# Patient Record
Sex: FEMALE | Race: BLACK OR AFRICAN AMERICAN | Employment: PART TIME | URBAN - METROPOLITAN AREA
[De-identification: names, ages, dates, MRNs, and addresses within clinical notes are randomized per-mention and may not be internally consistent; named-entity substitution may affect disease eponyms.]

---

## 2016-06-06 LAB — COLONOSCOPY, EXTERNAL: NORMAL

## 2017-01-18 RX ORDER — LANCETS 33 GAUGE
EACH MISCELLANEOUS
Qty: 100 LANCET | Status: SHIPPED | OUTPATIENT
Start: 2017-01-18 | End: 2018-04-08 | Stop reason: SDUPTHER

## 2017-01-18 RX ORDER — BLOOD SUGAR DIAGNOSTIC
STRIP MISCELLANEOUS
Qty: 100 STRIP | Status: SHIPPED | OUTPATIENT
Start: 2017-01-18 | End: 2018-02-09 | Stop reason: SDUPTHER

## 2017-01-24 RX ORDER — METOPROLOL SUCCINATE 200 MG/1
TABLET, EXTENDED RELEASE ORAL
Qty: 90 TAB | Refills: 0 | Status: SHIPPED | OUTPATIENT
Start: 2017-01-24 | End: 2017-07-10 | Stop reason: SDUPTHER

## 2017-01-27 ENCOUNTER — OFFICE VISIT (OUTPATIENT)
Dept: NEUROLOGY | Age: 72
End: 2017-01-27

## 2017-01-27 VITALS
HEIGHT: 64 IN | HEART RATE: 60 BPM | SYSTOLIC BLOOD PRESSURE: 142 MMHG | DIASTOLIC BLOOD PRESSURE: 86 MMHG | BODY MASS INDEX: 39.95 KG/M2 | WEIGHT: 234 LBS | OXYGEN SATURATION: 97 % | RESPIRATION RATE: 20 BRPM

## 2017-01-27 DIAGNOSIS — G45.2 MULTIPLE AND BILATERAL PRECEREBRAL ARTERY SYNDROMES: Primary | ICD-10-CM

## 2017-01-27 DIAGNOSIS — M54.81 OCCIPITAL NEURALGIA OF RIGHT SIDE: ICD-10-CM

## 2017-01-27 DIAGNOSIS — G45.2 MULTIPLE AND BILATERAL PRECEREBRAL ARTERY SYNDROMES: ICD-10-CM

## 2017-01-27 DIAGNOSIS — I10 ESSENTIAL HYPERTENSION: ICD-10-CM

## 2017-01-27 DIAGNOSIS — R09.89 BILATERAL CAROTID BRUITS: ICD-10-CM

## 2017-01-27 DIAGNOSIS — E78.00 ELEVATED CHOLESTEROL: ICD-10-CM

## 2017-01-27 DIAGNOSIS — E11.21 TYPE 2 DIABETES WITH NEPHROPATHY (HCC): ICD-10-CM

## 2017-01-27 DIAGNOSIS — G50.0 SUPRAORBITAL NEURALGIA: Primary | ICD-10-CM

## 2017-01-27 DIAGNOSIS — G47.33 OSA (OBSTRUCTIVE SLEEP APNEA): ICD-10-CM

## 2017-01-27 RX ORDER — OXCARBAZEPINE 300 MG/1
TABLET, FILM COATED ORAL
Qty: 90 TAB | Refills: 11 | Status: SHIPPED | OUTPATIENT
Start: 2017-01-27 | End: 2018-02-15 | Stop reason: SDUPTHER

## 2017-01-27 NOTE — PATIENT INSTRUCTIONS

## 2017-01-27 NOTE — MR AVS SNAPSHOT
Visit Information Date & Time Provider Department Dept. Phone Encounter #  
 1/27/2017 10:00 AM Antwan Cabral NP [facility] [phone] [id number] Follow-up Instructions Return in about 1 year (around 1/27/2018). Your Appointments 4/6/2017  9:15 AM  
ROUTINE CARE with Hattie Councilman, MD  
[address] [facility]) Appt Note: 4 month follow up DM  
 [address] [facility] [address] [facility]  
[phone]  
  
   
 [address] [place] [address] [address] [address] Upcoming Health Maintenance Date Due DTaP/Tdap/Td series (1 - Tdap) 10/8/1968 ZOSTER VACCINE AGE 60> 10/8/2007 MEDICARE YEARLY EXAM 12/5/2013 INFLUENZA AGE 9 TO ADULT 8/1/2016 MICROALBUMIN Q1 2/9/2017 FOBT Q 1 YEAR AGE 50-75 2/16/2017 EYE EXAM RETINAL OR DILATED Q1 4/25/2017 HEMOGLOBIN A1C Q6M 6/6/2017 LIPID PANEL Q1 11/9/2017 FOOT EXAM Q1 12/6/2017 BREAST CANCER SCRN MAMMOGRAM 3/21/2018 GLAUCOMA SCREENING Q2Y 4/25/2018 Allergies as of 1/27/2017  Review Complete On: 1/27/2017 By: Antwan Cabral NP Severity Noted Reaction Type Reactions Hydroxyzine  12/06/2016    Other (comments) Caused memory loss Levaquin [Levofloxacin]  02/21/2016    Diarrhea Lipitor [Atorvastatin]  12/11/2014   Side Effect Myalgia Lisinopril  12/04/2012   Side Effect Cough Penicillins  03/08/2011    Rash  
 Sulfa (Sulfonamide Antibiotics)  03/08/2011    Rash  
 Zoloft [Sertraline]  05/10/2016    Diarrhea Current Immunizations  Reviewed on 12/6/2016 Name Date Influenza Vaccine 10/29/2013 Influenza Vaccine (Quad) PF 12/18/2015 Influenza Vaccine Split 11/16/2012, 9/21/2011 10:00 AM  
 Pneumococcal Polysaccharide (PPSV-23) 12/4/2012 Pneumococcal Vaccine (Unspecified Type) 3/21/2012 Not reviewed this visit You Were Diagnosed With   
  
 Codes Comments Supraorbital neuralgia    -  Primary ICD-10-CM: G52.9 ICD-9-CM: 352.9 Multiple and bilateral precerebral artery syndromes     ICD-10-CM: G45.2 ICD-9-CM: 433.30 Elevated cholesterol     ICD-10-CM: E78.00 ICD-9-CM: 272.0 Essential hypertension     ICD-10-CM: I10 
ICD-9-CM: 401.9 Type 2 diabetes with nephropathy (HCC)     ICD-10-CM: E11.21 
ICD-9-CM: 250.40, 583.81   
 SKYLER (obstructive sleep apnea)     ICD-10-CM: G47.33 
ICD-9-CM: 327.23 Occipital neuralgia of right side     ICD-10-CM: M54.81 ICD-9-CM: 723.8 Bilateral carotid bruits     ICD-10-CM: R09.89 ICD-9-CM: 964. 9 Vitals BP Pulse Resp Height(growth percentile) Weight(growth percentile) SpO2  
 142/86 60 20 5' 4\" (1.626 m) 234 lb (106.1 kg) 97% BMI OB Status Smoking Status 40.17 kg/m2 Hysterectomy Never Smoker Vitals History BMI and BSA Data Body Mass Index Body Surface Area  
 40.17 kg/m 2 2.19 m 2 Preferred Pharmacy Pharmacy Name Phone [place] [address] Your Updated Medication List  
  
   
This list is accurate as of: 1/27/17 10:41 AM.  Always use your most recent med list. amLODIPine-valsartan  mg per tablet Commonly known as:  Ely Baptise Take 1 Tab by mouth daily. aspirin-dipyridamole  mg per SR capsule Commonly known as:  AGGRENOX  
TAKE ONE CAPSULE BY MOUTH TWICE A DAY * BD ULTRA-FINE II LANCETS 30 gauge Misc Generic drug:  lancets TEST DAILY OR AS DIRECTED * One Touch Delica 33 gauge Misc Generic drug:  lancets Use as directed * One Touch Delica 33 gauge Misc Generic drug:  lancets TEST DAILY OR AS DIRECTED BY PHYSICIAN Blood Pressure Monitor Kit Commonly known as:  BLOOD PRESSURE KIT Use 2-3 times a week or as directed Blood-Glucose Meter monitoring kit Commonly known as:  Atlee Citron Test daily or as directed. butalbital-acetaminophen-caffeine -40 mg per tablet Commonly known as:  Quiñonez eGlacio Take 1 Tab by mouth every four (4) hours as needed for Pain. cyclobenzaprine 10 mg tablet Commonly known as:  FLEXERIL  
TAKE ONE TABLET BY MOUTH THREE TIMES DAILY AS NEEDED FOR MUSCLE SPASM  
  
 fluticasone 50 mcg/actuation nasal spray Commonly known as:  Apurva Gut 2 Sprays by Both Nostrils route daily. glipiZIDE 10 mg tablet Commonly known as:  GLUCOTROL  
TAKE ONE TABLET BY MOUTH TWICE A DAY  
  
 hydroCHLOROthiazide 25 mg tablet Commonly known as:  HYDRODIURIL  
TAKE ONE TABLET BY MOUTH DAILY **GENERIC FOR: HYDRODIURIL  
  
 metoprolol succinate 200 mg XL tablet Commonly known as:  TOPROL-XL  
TAKE ONE TABLET BY MOUTH DAILY MUCINEX 1,200 mg Ta12 ER tablet Generic drug:  guaiFENesin Take  by mouth two (2) times a day. ONETOUCH ULTRA TEST strip Generic drug:  glucose blood VI test strips TEST DAILY OR AS DIRECTED OXcarbazepine 300 mg tablet Commonly known as:  TRILEPTAL  
TAKE ONE AND ONE-HALF TABLET BY MOUTH TWICE A DAY  
  
 rosuvastatin 40 mg tablet Commonly known as:  CRESTOR  
TAKE ONE TABLET BY MOUTH NIGHTLY SITagliptin-metFORMIN 100-1,000 mg Tm24 Commonly known as:  JANUMET XR Take 1 Tab by mouth Daily (before dinner). * Notice: This list has 3 medication(s) that are the same as other medications prescribed for you. Read the directions carefully, and ask your doctor or other care provider to review them with you. Prescriptions Sent to Pharmacy Refills OXcarbazepine (TRILEPTAL) 300 mg tablet 11 Sig: TAKE ONE AND ONE-HALF TABLET BY MOUTH TWICE A DAY Class: Normal  
 Pharmacy: [facility] [address], [address] [place] [address] #: [phone] Follow-up Instructions Return in about 1 year (around 1/27/2018). To-Do List   
 01/27/2017 Imaging:  DUPLEX CAROTID BILATERAL AMB NEURO Patient Instructions A Healthy Lifestyle: Care Instructions Your Care Instructions A healthy lifestyle can help you feel good, stay at a healthy weight, and have plenty of energy for both work and play. A healthy lifestyle is something you can share with your whole family. A healthy lifestyle also can lower your risk for serious health problems, such as high blood pressure, heart disease, and diabetes. You can follow a few steps listed below to improve your health and the health of your family. Follow-up care is a key part of your treatment and safety. Be sure to make and go to all appointments, and call your doctor if you are having problems. Its also a good idea to know your test results and keep a list of the medicines you take. How can you care for yourself at home? · Do not eat too much sugar, fat, or fast foods. You can still have dessert and treats now and then. The goal is moderation. · Start small to improve your eating habits. Pay attention to portion sizes, drink less juice and soda pop, and eat more fruits and vegetables. ¨ Eat a healthy amount of food. A 3-ounce serving of meat, for example, is about the size of a deck of cards. Fill the rest of your plate with vegetables and whole grains. ¨ Limit the amount of soda and sports drinks you have every day. Drink more water when you are thirsty. ¨ Eat at least 5 servings of fruits and vegetables every day. It may seem like a lot, but it is not hard to reach this goal. A serving or helping is 1 piece of fruit, 1 cup of vegetables, or 2 cups of leafy, raw vegetables. Have an apple or some carrot sticks as an afternoon snack instead of a candy bar. Try to have fruits and/or vegetables at every meal. 
· Make exercise part of your daily routine. You may want to start with simple activities, such as walking, bicycling, or slow swimming. Try to be active 30 to 60 minutes every day. You do not need to do all 30 to 60 minutes all at once.  For example, you can exercise 3 times a day for 10 or 20 minutes. Moderate exercise is safe for most people, but it is always a good idea to talk to your doctor before starting an exercise program. 
· Keep moving. Mala Mauricio the lawn, work in the garden, or [employer]. Take the stairs instead of the elevator at work. · If you smoke, quit. People who smoke have an increased risk for heart attack, stroke, cancer, and other lung illnesses. Quitting is hard, but there are ways to boost your chance of quitting tobacco for good. ¨ Use nicotine gum, patches, or lozenges. ¨ Ask your doctor about stop-smoking programs and medicines. ¨ Keep trying. In addition to reducing your risk of diseases in the future, you will notice some benefits soon after you stop using tobacco. If you have shortness of breath or asthma symptoms, they will likely get better within a few weeks after you quit. · Limit how much alcohol you drink. Moderate amounts of alcohol (up to 2 drinks a day for men, 1 drink a day for women) are okay. But drinking too much can lead to liver problems, high blood pressure, and other health problems. Family health If you have a family, there are many things you can do together to improve your health. · Eat meals together as a family as often as possible. · Eat healthy foods. This includes fruits, vegetables, lean meats and dairy, and whole grains. · Include your family in your fitness plan. Most people think of activities such as jogging or tennis as the way to fitness, but there are many ways you and your family can be more active. Anything that makes you breathe hard and gets your heart pumping is exercise. Here are some tips: 
¨ Walk to do errands or to take your child to school or the bus. ¨ Go for a family bike ride after dinner instead of watching TV. Where can you learn more? Go to http://yasmin-lokesh.info/. Enter D823 in the search box to learn more about \"A Healthy Lifestyle: Care Instructions. \" Current as of: July 26, 2016 Content Version: 11.1 © 5421-3573 [employer]. Care instructions adapted under license by [employer] (which disclaims liability or warranty for this information). If you have questions about a medical condition or this instruction, always ask your healthcare professional. [address] any warranty or liability for your use of this information. Introducing [facility]! Dear Jayda Green: 
Thank you for requesting a Compete account. Our records indicate that you already have an active Compete account. You can access your account anytime at [email or web address] Did you know that you can access your hospital and ER discharge instructions at any time in Compete? You can also review all of your test results from your hospital stay or ER visit. Additional Information If you have questions, please visit the Frequently Asked Questions section of the Compete website at [email or web address] Remember, Compete is NOT to be used for urgent needs. For medical emergencies, dial 911. Now available from your iPhone and Android! Please provide this summary of care documentation to your next provider. Your primary care clinician is listed as Steffanie Fung. If you have any questions after today's visit, please call [phone].

## 2017-01-27 NOTE — PROGRESS NOTES
The neuralgia is better but she can tell if she doesn't take the medications it will act up like yesterday but it went away very quickly   She doesn't forget very often because she knows it will start hurting very soon after not taking the medication

## 2017-01-27 NOTE — PROGRESS NOTES
Date:  2017    Name:  Salo Alexis  :  1945  MRN:  [id number]     PCP:  Natalee Pham MD    Chief Complaint   Patient presents with    Neurologic Problem     supraorbital neuralgia      HISTORY OF PRESENT ILLNESS: Follow up supraorbital neuralgia. Since she has been on the Trileptal, she has not had any issues with this. States that if she forgets to take it that the pain will tick up. Otherwise she has been doing well. No stroke like symptoms. Continues with Aggrenox for prevention, Crestor for treatment of the dyslipidemia. Current Outpatient Prescriptions   Medication Sig    metoprolol succinate (TOPROL-XL) 200 mg XL tablet TAKE ONE TABLET BY MOUTH DAILY    ONE TOUCH DELICA 33 gauge misc TEST DAILY OR AS DIRECTED BY PHYSICIAN    ONETOUCH ULTRA TEST strip TEST DAILY OR AS DIRECTED    OXcarbazepine (TRILEPTAL) 300 mg tablet TAKE ONE AND ONE-HALF TABLET BY MOUTH TWICE A DAY MUST CALL MD FOR APPOINTMENT    ONE TOUCH DELICA 33 gauge misc Use as directed    hydrochlorothiazide (HYDRODIURIL) 25 mg tablet TAKE ONE TABLET BY MOUTH DAILY **GENERIC FOR: HYDRODIURIL    aspirin-dipyridamole (AGGRENOX)  mg per SR capsule TAKE ONE CAPSULE BY MOUTH TWICE A DAY    rosuvastatin (CRESTOR) 40 mg tablet TAKE ONE TABLET BY MOUTH NIGHTLY    glipiZIDE (GLUCOTROL) 10 mg tablet TAKE ONE TABLET BY MOUTH TWICE A DAY    sitaGLIPtin-metFORMIN (JANUMET XR) 100-1,000 mg TM24 Take 1 Tab by mouth Daily (before dinner).  amLODIPine-valsartan (EXFORGE)  mg per tablet Take 1 Tab by mouth daily.  Blood Pressure Monitor (BLOOD PRESSURE KIT) kit Use 2-3 times a week or as directed    fluticasone (FLONASE) 50 mcg/actuation nasal spray 2 Sprays by Both Nostrils route daily.  guaiFENesin (MUCINEX) 1,200 mg Ta12 ER tablet Take  by mouth two (2) times a day.  Blood-Glucose Meter (ONETOUCH ULTRAMINI) monitoring kit Test daily or as directed.     butalbital-acetaminophen-caffeine (FIORICET, ESGIC) -40 mg per tablet Take 1 Tab by mouth every four (4) hours as needed for Pain.  cyclobenzaprine (FLEXERIL) 10 mg tablet TAKE ONE TABLET BY MOUTH THREE TIMES DAILY AS NEEDED FOR MUSCLE SPASM    BD ULTRA-FINE II LANCETS 30 gauge misc TEST DAILY OR AS DIRECTED     No current facility-administered medications for this visit. Allergies   Allergen Reactions    Hydroxyzine Other (comments)     Caused memory loss    Levaquin [Levofloxacin] Diarrhea    Lipitor [Atorvastatin] Myalgia    Lisinopril Cough    Penicillins Rash    Sulfa (Sulfonamide Antibiotics) Rash    Zoloft [Sertraline] Diarrhea     Past Medical History   Diagnosis Date    ACP (advance care planning) 2/9/2016     Patient plans to prepare an advanced directive and bring it in   [place] [id number] 5/31/2016    Cataracts, bilateral 8/1/2011    Depression, major, single episode, moderate ([address] 8/1/2011     Sees Dr Vinnie Botello    Diabetic nephropathy ([address] 8/1/2011    Essential hypertension     Financial difficulties 7/30/2012    Hyperlipidemia     Obesity     SKYLER (obstructive sleep apnea) 2/9/2016    Pregnancy 8/1/2011    Stroke ([address]      2004 ARABELLA    TIA (transient ischemic attack) 8/1/2011     Past Surgical History   Procedure Laterality Date    Hx myomectomy       oiver 30 years    Hx gyn      Hx hysterectomy       over 25 years     Social History     Social History    Marital status: SINGLE     Spouse name: N/A    Number of children: N/A    Years of education: N/A     Occupational History    Not on file.      Social History Main Topics    Smoking status: Never Smoker    Smokeless tobacco: Never Used    Alcohol use 0.6 oz/week     1 Standard drinks or equivalent per week      Comment: occasionally    Drug use: No    Sexual activity: Not Currently     Other Topics Concern    Not on file     Social History Narrative     Family History   Problem Relation Age of Onset    Hypertension Mother     Seizures Mother  Hypertension Son     Diabetes Son     Elevated Lipids Son     Ataxia Daughter     Seizures Brother     Cancer Maternal Aunt        PHYSICAL EXAMINATION:    Visit Vitals    /86    Pulse 60    Resp 20    Ht 5' 4\" (1.626 m)    Wt 106.1 kg (234 lb)    SpO2 97%    BMI 40.17 kg/m2     General: Well defined, nourished, and groomed individual in no acute distress. Neck: Supple, nontender, no bruits, no pain with resistance to active range of motion. Significant neck shoulder and back spasm. There is some tenderness between the shoulders   Heart: Regular rate and rhythm, no murmurs, rub, or gallop. Normal S1S2. Lungs: Clear to auscultation bilaterally with equal chest expansion, no cough, no wheeze   Musculoskeletal: Extremities revealed no edema and had full range of motion of joints. Psych: Good mood and bright affect   NEUROLOGICAL EXAMINATION:   Mental Status: Alert and oriented to person, place, and time with recent and remote memory intact. Attention span and concentration are normal. Speech is fluent with a full fund of knowledge. Cranial Nerves:   II, III, IV, VI: Visual acuity grossly intact. Visual fields are normal.   Pupils are equal, round, and reactive to light and accommodation. Extra-ocular movements are full and fluid. Fundoscopic exam was benign, no ptosis or nystagmus. V-XII: Hearing is grossly intact. Facial features are symmetric, with normal sensation and strength. The palate rises symmetrically and the tongue protrudes midline. Sternocleidomastoids 5/5. Motor Examination: Normal tone, bulk, and strength, 5/5 muscle strength throughout. Coordination: No resting or intention tremor   Gait and Station: Steady while walking. No pronator drift. No muscle wasting or fasiculations noted. Reflexes: DTRs 2+ throughout. Mild left supraorbital and mild right occipital tenderness with palpation. ASSESSMENT AND PLAN    ICD-10-CM ICD-9-CM    1.  Supraorbital neuralgia G52.9 352.9 OXcarbazepine (TRILEPTAL) 300 mg tablet   2. Multiple and bilateral precerebral artery syndromes G45.2 433.30    3. Elevated cholesterol E78.00 272.0    4. Essential hypertension I10 401.9    5. Type 2 diabetes with nephropathy (HCC) E11.21 250.40      583.81    6. SKYLER (obstructive sleep apnea) G47.33 327.23    7. Occipital neuralgia of right side M54.81 723.8 OXcarbazepine (TRILEPTAL) 300 mg tablet   8. Bilateral carotid bruits R09.89 785.9 DUPLEX CAROTID BILATERAL AMB NEURO      DUPLEX CAROTID BILATERAL AMB NEURO       Discussed secondary stroke prevention as well as signs and symptoms of stroke, BE-FAST, and when to call for EMS. Stressed importance of recognition and early intervention. LDL goal less than 70 per secondary stroke guidelines. Continue with Aggrenox for secondary stroke prevention. Discussed lifestyle modification and importance of exercise and appropriate diet, weight management, and management of comorbid disease with appropriate follow up visits with primary care and other healthcare providers. Discussed SKYLER as a stroke risk. Given DM and bilateral carotid bruits with previous TIA and Stroke, she will have reassessment of the carotids with a doppler. TGN and supraorbital neuralgia are stable on present therapy of Trileptal.     Follow up in one year or sooner if needed. Bernie Balderrama

## 2017-02-09 ENCOUNTER — TELEPHONE (OUTPATIENT)
Dept: SLEEP MEDICINE | Age: 72
End: 2017-02-09

## 2017-02-17 ENCOUNTER — HOSPITAL ENCOUNTER (OUTPATIENT)
Dept: GENERAL RADIOLOGY | Age: 72
Discharge: HOME | End: 2017-02-17
Attending: SPECIALIST
Payer: MEDICARE

## 2017-02-17 DIAGNOSIS — R05.9 COUGH: ICD-10-CM

## 2017-02-17 DIAGNOSIS — K21.9 LARYNGOPHARYNGEAL REFLUX: ICD-10-CM

## 2017-02-17 PROCEDURE — 74220 X-RAY XM ESOPHAGUS 1CNTRST: CPT

## 2017-03-16 DIAGNOSIS — E11.21 TYPE 2 DIABETES WITH NEPHROPATHY (HCC): ICD-10-CM

## 2017-03-16 RX ORDER — SITAGLIPTIN AND METFORMIN HYDROCHLORIDE 100; 1000 MG/1; MG/1
TABLET, FILM COATED, EXTENDED RELEASE ORAL
Qty: 30 TAB | Refills: 5 | Status: SHIPPED | OUTPATIENT
Start: 2017-03-16 | End: 2017-09-16 | Stop reason: SDUPTHER

## 2017-04-06 ENCOUNTER — OFFICE VISIT (OUTPATIENT)
Dept: FAMILY MEDICINE CLINIC | Age: 72
End: 2017-04-06

## 2017-04-06 VITALS
SYSTOLIC BLOOD PRESSURE: 156 MMHG | RESPIRATION RATE: 18 BRPM | HEIGHT: 64 IN | DIASTOLIC BLOOD PRESSURE: 63 MMHG | HEART RATE: 67 BPM | BODY MASS INDEX: 40.39 KG/M2 | WEIGHT: 236.6 LBS | TEMPERATURE: 98.6 F

## 2017-04-06 DIAGNOSIS — Z13.39 SCREENING FOR ALCOHOLISM: ICD-10-CM

## 2017-04-06 DIAGNOSIS — Z23 NEED FOR ZOSTER VACCINATION: ICD-10-CM

## 2017-04-06 DIAGNOSIS — Z00.00 ROUTINE GENERAL MEDICAL EXAMINATION AT A HEALTH CARE FACILITY: Primary | ICD-10-CM

## 2017-04-06 DIAGNOSIS — Z71.89 ACP (ADVANCE CARE PLANNING): ICD-10-CM

## 2017-04-06 DIAGNOSIS — E11.21 TYPE 2 DIABETES WITH NEPHROPATHY (HCC): ICD-10-CM

## 2017-04-06 RX ORDER — HYDROCODONE BITARTRATE AND ACETAMINOPHEN 5; 325 MG/1; MG/1
TABLET ORAL
COMMUNITY
Start: 2017-03-14 | End: 2017-08-08

## 2017-04-06 RX ORDER — CLINDAMYCIN HYDROCHLORIDE 150 MG/1
CAPSULE ORAL
COMMUNITY
Start: 2017-03-14 | End: 2017-04-06 | Stop reason: ALTCHOICE

## 2017-04-06 NOTE — PROGRESS NOTES
Medicare Part B Preventive Services Limitations Recommendation Scheduled   Bone Mass Measurement  (age 72 & older, biennial) Requires diagnosis related to osteoporosis or estrogen deficiency. Biennial benefit unless patient has history of long-term glucocorticoid tx or baseline is needed because initial test was by other method Every 2 years     Due: 3/21/2018   Cardiovascular Screening Blood Tests (every 5 years)  Total cholesterol, HDL, Triglycerides Order as a panel if possible Every 6-12 months     Due: 11/9/2017   Colorectal Cancer Screening  -Fecal occult blood test (annual)  -Flexible sigmoidoscopy (5y)  -Screening colonoscopy (10y)  -Barium Enema                Q10 years  Last: 6/6/2016               Due: 6/6/2026   Counseling to Prevent Tobacco Use (up to 8 sessions per year)  - Counseling greater than 3 and up to 10 minutes  - Counseling greater than 10 minutes Patients must be asymptomatic of tobacco-related conditions to receive as preventive service  NA   Diabetes Screening Tests (at least every 3 years, Medicare covers annually or at 6-month intervals for prediabetic patients)    Fasting blood sugar (FBS) or glucose tolerance test (GTT) Patient must be diagnosed with one of the following:  -Hypertension, Dyslipidemia, obesity, previous impaired FBS or GTT  Or any two of the following: overweight, FH of diabetes, age ? 72, history of gestational diabetes, birth of baby weighing more than 9 pounds Every Year   Due:  6/6/2017                               Diabetes Self-Management Training (DSMT) (no USPSTF recommendation) Requires referral by treating physician for patient with diabetes or renal disease. 10 hours of initial DSMT session of no less than 30 minutes each in a continuous 12-month period. 2 hours of follow-up DSMT in subsequent years.   N/A   Glaucoma Screening (no USPSTF recommendation) Diabetes mellitus, family history, , age 48 or over,  American, age 72 or over Every 2 Years   Due: 4/25/2018   Human Immunodeficiency Virus (HIV) Screening (annually for increased risk patients)  HIV-1 and HIV-2 by EIA, KANDY, rapid antibody test, or oral mucosa transudate Patient must be at increased risk for HIV infection per USPSTF guidelines or pregnant. Tests covered annually for patients at increased risk. Pregnant patients may receive up to 3 test during pregnancy. NA   Medical Nutrition Therapy (MNT) (for diabetes or renal disease not recommended schedule) Requires referral by treating physician for patient with diabetes or renal disease. Can be provided in same year as diabetes self-management training (DSMT), and CMS recommends medical nutrition therapy take place after DSMT. Up to 3 hours for initial year and 2 hours in subsequent years. NA   Seasonal Influenza Vaccination (annually)  Seasonally (9 months)   Due: 7/2017   Pneumococcal Vaccination (once after 72)   completed             Hepatitis B Vaccinations (if medium/high risk) Medium/high risk factors:  End-stage renal disease,  Hemophiliacs who received Factor VIII or IX concentrates, Clients of institutions for the mentally retarded, Persons who live in the same house as a HepB virus carrier, Homosexual men, Illicit injectable drug abusers. NA   Screening Mammography (biennial age 54-69)? Annually (age 36 or over) Yearly   Due: 3/21/2018   Screening Pap Tests and Pelvic Examination (up to age 79 and after 79 if unknown history or abnormal study last 10 years) Every 24 months except high risk Every 2 years     NA   Ultrasound Screening for Abdominal Aortic Aneurysm (AAA) (once) Patient must be referred through IPPE and not have had a screening for abdominal aortic aneurysm before under Medicare.   Limited to patients who meet one of the following criteria:  - Men who are 73-68 years old and have smoked more than 100 cigarettes in their lifetime.  -Anyone with a FH of AAA  -Anyone recommended for screening by USPSTF  NA

## 2017-04-06 NOTE — MR AVS SNAPSHOT
Visit Information Date & Time Provider Department Dept. Phone Encounter #  
 4/6/2017  9:15 AM Gretchen LoredoRudy 34 [id number] Follow-up Instructions Return in about 1 month (around 5/6/2017) for diabetes, check blood pressure. Your Appointments 5/5/2017  9:15 AM  
ROUTINE CARE with Gretchen Loredo MD  
[address] [facility]) Appt Note: 1 month follow up DM  
 [address]  
[phone]  
  
   
 [address] [place] [address] [address] [address] Upcoming Health Maintenance Date Due DTaP/Tdap/Td series (1 - Tdap) 10/8/1968 ZOSTER VACCINE AGE 60> 10/8/2007 MEDICARE YEARLY EXAM 12/5/2013 INFLUENZA AGE 9 TO ADULT 8/1/2016 MICROALBUMIN Q1 2/9/2017 EYE EXAM RETINAL OR DILATED Q1 4/25/2017 HEMOGLOBIN A1C Q6M 6/6/2017 LIPID PANEL Q1 11/9/2017 FOOT EXAM Q1 12/6/2017 Bone Densitometry 3/21/2018 BREAST CANCER SCRN MAMMOGRAM 3/21/2018 GLAUCOMA SCREENING Q2Y 4/25/2018 COLONOSCOPY 6/6/2026 Allergies as of 4/6/2017  Review Complete On: 4/6/2017 By: Gretchen Loredo MD  
  
 Severity Noted Reaction Type Reactions Hydroxyzine  12/06/2016    Other (comments) Caused memory loss Levaquin [Levofloxacin]  02/21/2016    Diarrhea Lipitor [Atorvastatin]  12/11/2014   Side Effect Myalgia Lisinopril  12/04/2012   Side Effect Cough Penicillins  03/08/2011    Rash  
 Sulfa (Sulfonamide Antibiotics)  03/08/2011    Rash  
 Zoloft [Sertraline]  05/10/2016    Diarrhea Current Immunizations  Reviewed on 12/6/2016 Name Date Influenza Vaccine 10/29/2013 Influenza Vaccine (Quad) PF 12/18/2015 Influenza Vaccine Split 11/16/2012, 9/21/2011 10:00 AM  
 Pneumococcal Polysaccharide (PPSV-23) 12/4/2012 Pneumococcal Vaccine (Unspecified Type) 3/21/2012 Not reviewed this visit You Were Diagnosed With   
  
 Codes Comments Routine general medical examination at a health care facility    -  Primary ICD-10-CM: Z00.00 ICD-9-CM: V70.0 Type 2 diabetes with nephropathy (HCC)     ICD-10-CM: E11.21 
ICD-9-CM: 250.40, 583.81 Need for zoster vaccination     ICD-10-CM: E31 ICD-9-CM: V04.89 Screening for alcoholism     ICD-10-CM: Z13.89 ICD-9-CM: V79.1 ACP (advance care planning)     ICD-10-CM: Z71.89 ICD-9-CM: V65.49 Vitals BP Pulse Temp Resp Height(growth percentile) Weight(growth percentile) 156/63 (BP 1 Location: Left arm, BP Patient Position: Sitting) 67 98.6 °F (37 °C) (Oral) 18 5' 4\" (1.626 m) 236 lb 9.6 oz (107.3 kg) BMI OB Status Smoking Status 40.61 kg/m2 Hysterectomy Never Smoker Vitals History BMI and BSA Data Body Mass Index Body Surface Area  
 40.61 kg/m 2 2.2 m 2 Preferred Pharmacy Pharmacy Name Phone [facility] [address] [place] [address] [phone] Your Updated Medication List  
  
   
This list is accurate as of: 4/6/17  9:55 AM.  Always use your most recent med list. amLODIPine-valsartan  mg per tablet Commonly known as:  Dairl Heaps Take 1 Tab by mouth daily. aspirin-dipyridamole  mg per SR capsule Commonly known as:  AGGRENOX  
TAKE ONE CAPSULE BY MOUTH TWICE A DAY * BD ULTRA-FINE II LANCETS 30 gauge Misc Generic drug:  lancets TEST DAILY OR AS DIRECTED * One Touch Delica 33 gauge Misc Generic drug:  lancets Use as directed * One Touch Delica 33 gauge Misc Generic drug:  lancets TEST DAILY OR AS DIRECTED BY PHYSICIAN Blood Pressure Monitor Kit Commonly known as:  BLOOD PRESSURE KIT Use 2-3 times a week or as directed Blood-Glucose Meter monitoring kit Commonly known as:  Chloe Eriberto Test daily or as directed. butalbital-acetaminophen-caffeine -40 mg per tablet Commonly known as:  Eliud Martini  
 Take 1 Tab by mouth every four (4) hours as needed for Pain. cyclobenzaprine 10 mg tablet Commonly known as:  FLEXERIL  
TAKE ONE TABLET BY MOUTH THREE TIMES DAILY AS NEEDED FOR MUSCLE SPASM  
  
 fluticasone 50 mcg/actuation nasal spray Commonly known as:  Algernon Pricilla 2 Sprays by Both Nostrils route daily. glipiZIDE 10 mg tablet Commonly known as:  GLUCOTROL  
TAKE ONE TABLET BY MOUTH TWICE A DAY  
  
 hydroCHLOROthiazide 25 mg tablet Commonly known as:  HYDRODIURIL  
TAKE ONE TABLET BY MOUTH DAILY **GENERIC FOR: HYDRODIURIL HYDROcodone-acetaminophen 5-325 mg per tablet Commonly known as:  NORCO  
  
 JANUMET -1,000 mg Tm24 Generic drug:  SITagliptin-metFORMIN  
TAKE ONE TABLET BY MOUTH DAILY (BEFORE DINNER) metoprolol succinate 200 mg XL tablet Commonly known as:  TOPROL-XL  
TAKE ONE TABLET BY MOUTH DAILY MUCINEX 1,200 mg Ta12 ER tablet Generic drug:  guaiFENesin Take  by mouth two (2) times a day. ONETOUCH ULTRA TEST strip Generic drug:  glucose blood VI test strips TEST DAILY OR AS DIRECTED OXcarbazepine 300 mg tablet Commonly known as:  TRILEPTAL  
TAKE ONE AND ONE-HALF TABLET BY MOUTH TWICE A DAY  
  
 rosuvastatin 40 mg tablet Commonly known as:  CRESTOR  
TAKE ONE TABLET BY MOUTH NIGHTLY  
  
 varicella zoster vacine live 19,400 unit/0.65 mL Susr injection Commonly known as:  ZOSTAVAX  
1 Vial by SubCUTAneous route once for 1 dose. * Notice: This list has 3 medication(s) that are the same as other medications prescribed for you. Read the directions carefully, and ask your doctor or other care provider to review them with you. Prescriptions Sent to Pharmacy Refills  
 varicella zoster vacine live (ZOSTAVAX) 19,400 unit/0.65 mL susr injection 0 Si Vial by SubCUTAneous route once for 1 dose. Class: Normal  
 Pharmacy: [facility] [address] [address], [address] [facility], [address] #: [phone] Route: SubCUTAneous We Performed the Following HEMOGLOBIN A1C WITH EAG [88615 CPT(R)] MICROALBUMIN, UR, RAND W/ MICROALBUMIN/CREA RATIO [id number] CPT(R)] Follow-up Instructions Return in about 1 month (around 5/6/2017) for diabetes, check blood pressure. Patient Instructions Medicare Part B Preventive Services Limitations Recommendation Scheduled Bone Mass Measurement 
(age 72 & older, biennial) Requires diagnosis related to osteoporosis or estrogen deficiency. Biennial benefit unless patient has history of long-term glucocorticoid tx or baseline is needed because initial test was by other method Every 2 years Due: 3/21/2018 Cardiovascular Screening Blood Tests (every 5 years) Total cholesterol, HDL, Triglycerides Order as a panel if possible Every 6-12 months Due: 11/9/2017 Colorectal Cancer Screening 
-Fecal occult blood test (annual) -Flexible sigmoidoscopy (5y) 
-Screening colonoscopy (10y) -Barium Enema Q10 years Last: 6/6/2016 Due: 6/6/2026 Counseling to Prevent Tobacco Use (up to 8 sessions per year) - Counseling greater than 3 and up to 10 minutes - Counseling greater than 10 minutes Patients must be asymptomatic of tobacco-related conditions to receive as preventive service  NA Diabetes Screening Tests (at least every 3 years, Medicare covers annually or at 6-month intervals for prediabetic patients) Fasting blood sugar (FBS) or glucose tolerance test (GTT) Patient must be diagnosed with one of the following: 
-Hypertension, Dyslipidemia, obesity, previous impaired FBS or GTT 
Or any two of the following: overweight, FH of diabetes, age ? 72, history of gestational diabetes, birth of baby weighing more than 9 pounds Every Year Due: 
6/6/2017 Diabetes Self-Management Training (DSMT) (no USPSTF recommendation) Requires referral by treating physician for patient with diabetes or renal disease. 10 hours of initial DSMT session of no less than 30 minutes each in a continuous 12-month period. 2 hours of follow-up DSMT in subsequent years. N/A Glaucoma Screening (no USPSTF recommendation) Diabetes mellitus, family history, , age 48 or over,  American, age 72 or over Every 2 Years Due: 4/25/2018 Human Immunodeficiency Virus (HIV) Screening (annually for increased risk patients) HIV-1 and HIV-2 by EIA, KANDY, rapid antibody test, or oral mucosa transudate Patient must be at increased risk for HIV infection per USPSTF guidelines or pregnant. Tests covered annually for patients at increased risk. Pregnant patients may receive up to 3 test during pregnancy. NA Medical Nutrition Therapy (MNT) (for diabetes or renal disease not recommended schedule) Requires referral by treating physician for patient with diabetes or renal disease. Can be provided in same year as diabetes self-management training (DSMT), and CMS recommends medical nutrition therapy take place after DSMT. Up to 3 hours for initial year and 2 hours in subsequent years. NA Seasonal Influenza Vaccination (annually)  Seasonally (9 months) Due: 7/2017 Pneumococcal Vaccination (once after 65)   completed Hepatitis B Vaccinations (if medium/high risk) Medium/high risk factors:  End-stage renal disease, Hemophiliacs who received Factor VIII or IX concentrates, Clients of institutions for the mentally retarded, Persons who live in the same house as a HepB virus carrier, Homosexual men, Illicit injectable drug abusers. NA Screening Mammography (biennial age 54-69)? Annually (age 36 or over) Yearly Due: 3/21/2018 Screening Pap Tests and Pelvic Examination (up to age 79 and after 79 if unknown history or abnormal study last 10 years) Every 24 months except high risk Every 2 years NA Ultrasound Screening for Abdominal Aortic Aneurysm (AAA) (once) Patient must be referred through [facility] and not have had a screening for abdominal aortic aneurysm before under Medicare. Limited to patients who meet one of the following criteria: 
- Men who are 73-68 years old and have smoked more than 100 cigarettes in their lifetime. 
-Anyone with a FH of AAA 
-Anyone recommended for screening by USPSTF  NA Introducing [facility]! Dear Kathy Melgoaz: 
Thank you for requesting a [employer] account. Our records indicate that you already have an active [employer] account. You can access your account anytime at [email or web address] Did you know that you can access your hospital and ER discharge instructions at any time in [employer]? You can also review all of your test results from your hospital stay or ER visit. Additional Information If you have questions, please visit the Frequently Asked Questions section of the [employer] website at [email or web address] Remember, [employer] is NOT to be used for urgent needs. For medical emergencies, dial 911. Now available from your iPhone and Android! Please provide this summary of care documentation to your next provider. Your primary care clinician is listed as Vicki Michael. If you have any questions after today's visit, please call [phone].

## 2017-04-07 LAB
ALBUMIN/CREAT UR: 87.1 MG/G CREAT (ref 0–30)
CREAT UR-MCNC: 67.7 MG/DL
MICROALBUMIN UR-MCNC: 59 UG/ML

## 2017-04-20 DIAGNOSIS — E11.21 TYPE 2 DIABETES WITH NEPHROPATHY (HCC): ICD-10-CM

## 2017-04-23 RX ORDER — GLIPIZIDE 10 MG/1
TABLET ORAL
Qty: 60 TAB | Refills: 5 | Status: SHIPPED | OUTPATIENT
Start: 2017-04-23 | End: 2017-11-04 | Stop reason: SDUPTHER

## 2017-05-02 LAB
EST. AVERAGE GLUCOSE BLD GHB EST-MCNC: 194 MG/DL
HBA1C MFR BLD: 8.4 % (ref 4.8–5.6)

## 2017-05-07 ENCOUNTER — TELEPHONE (OUTPATIENT)
Dept: FAMILY MEDICINE CLINIC | Age: 72
End: 2017-05-07

## 2017-05-07 NOTE — TELEPHONE ENCOUNTER
Please call patient and let her know her diabetes has gotten even worse. She needs to schedule an appointment about this in the next several weeks and bring her blood sugars.

## 2017-05-12 ENCOUNTER — HOSPITAL ENCOUNTER (OUTPATIENT)
Dept: GENERAL RADIOLOGY | Age: 72
Discharge: HOME | End: 2017-05-12
Payer: MEDICARE

## 2017-05-12 ENCOUNTER — OFFICE VISIT (OUTPATIENT)
Dept: FAMILY MEDICINE CLINIC | Age: 72
End: 2017-05-12

## 2017-05-12 VITALS
WEIGHT: 237 LBS | DIASTOLIC BLOOD PRESSURE: 71 MMHG | TEMPERATURE: 98.7 F | RESPIRATION RATE: 20 BRPM | BODY MASS INDEX: 40.46 KG/M2 | SYSTOLIC BLOOD PRESSURE: 160 MMHG | HEIGHT: 64 IN | HEART RATE: 65 BPM

## 2017-05-12 DIAGNOSIS — Z59.9 FINANCIAL DIFFICULTIES: ICD-10-CM

## 2017-05-12 DIAGNOSIS — E11.21 TYPE 2 DIABETES WITH NEPHROPATHY (HCC): Primary | ICD-10-CM

## 2017-05-12 DIAGNOSIS — M54.50 LEFT-SIDED LOW BACK PAIN WITHOUT SCIATICA, UNSPECIFIED CHRONICITY: ICD-10-CM

## 2017-05-12 DIAGNOSIS — I10 ESSENTIAL HYPERTENSION: ICD-10-CM

## 2017-05-12 PROCEDURE — 72100 X-RAY EXAM L-S SPINE 2/3 VWS: CPT

## 2017-05-12 RX ORDER — IBUPROFEN 800 MG/1
800 TABLET ORAL
Qty: 40 TAB | Refills: 0 | Status: SHIPPED | OUTPATIENT
Start: 2017-05-12 | End: 2017-08-08

## 2017-05-12 SDOH — ECONOMIC STABILITY - INCOME SECURITY: PROBLEM RELATED TO HOUSING AND ECONOMIC CIRCUMSTANCES, UNSPECIFIED: Z59.9

## 2017-05-12 NOTE — MR AVS SNAPSHOT
Visit Information Date & Time Provider Department Dept. Phone Encounter #  
 5/12/2017  8:30 AM Rudy Zuniga 34 [id number] Follow-up Instructions Return in about 3 months (around 8/12/2017) for diabetes/if back pain not better in one month. Upcoming Health Maintenance Date Due DTaP/Tdap/Td series (1 - Tdap) 10/8/1968 ZOSTER VACCINE AGE 60> 10/8/2007 EYE EXAM RETINAL OR DILATED Q1 4/25/2017 INFLUENZA AGE 9 TO ADULT 8/1/2017 HEMOGLOBIN A1C Q6M 11/1/2017 LIPID PANEL Q1 11/9/2017 FOOT EXAM Q1 12/6/2017 Bone Densitometry 3/21/2018 BREAST CANCER SCRN MAMMOGRAM 3/21/2018 MICROALBUMIN Q1 4/6/2018 MEDICARE YEARLY EXAM 4/7/2018 GLAUCOMA SCREENING Q2Y 4/25/2018 COLONOSCOPY 6/6/2026 Allergies as of 5/12/2017  Review Complete On: 5/12/2017 By: Campos Roche MD  
  
 Severity Noted Reaction Type Reactions Hydroxyzine  12/06/2016    Other (comments) Caused memory loss Levaquin [Levofloxacin]  02/21/2016    Diarrhea Lipitor [Atorvastatin]  12/11/2014   Side Effect Myalgia Lisinopril  12/04/2012   Side Effect Cough Penicillins  03/08/2011    Rash  
 Sulfa (Sulfonamide Antibiotics)  03/08/2011    Rash  
 Zoloft [Sertraline]  05/10/2016    Diarrhea Current Immunizations  Reviewed on 12/6/2016 Name Date Influenza Vaccine 10/29/2013 Influenza Vaccine (Quad) PF 12/18/2015 Influenza Vaccine Split 11/16/2012, 9/21/2011 10:00 AM  
 Pneumococcal Polysaccharide (PPSV-23) 12/4/2012 Pneumococcal Vaccine (Unspecified Type) 3/21/2012 Not reviewed this visit You Were Diagnosed With   
  
 Codes Comments Type 2 diabetes with nephropathy (HCC)    -  Primary ICD-10-CM: E11.21 
ICD-9-CM: 250.40, 583.81 Left-sided low back pain without sciatica, unspecified chronicity     ICD-10-CM: M54.5 ICD-9-CM: 724.2 Essential hypertension     ICD-10-CM: I10 
ICD-9-CM: 401.9 Financial difficulties     ICD-10-CM: Z59.8 ICD-9-CM: V60.2 Vitals BP Pulse Temp Resp Height(growth percentile) Weight(growth percentile) 160/71 (BP 1 Location: Left arm, BP Patient Position: Sitting) 65 98.7 °F (37.1 °C) (Oral) 20 5' 4\" (1.626 m) 237 lb (107.5 kg) BMI OB Status Smoking Status 40.68 kg/m2 Hysterectomy Never Smoker Vitals History BMI and BSA Data Body Mass Index Body Surface Area  
 40.68 kg/m 2 2.2 m 2 Preferred Pharmacy Pharmacy Name Phone [facility] [address], [address] [place] [address], [address] [phone] Your Updated Medication List  
  
   
This list is accurate as of: 5/12/17  9:25 AM.  Always use your most recent med list. amLODIPine-valsartan  mg per tablet Commonly known as:  Domínguez Destin Take 1 Tab by mouth daily. aspirin-dipyridamole  mg per SR capsule Commonly known as:  AGGRENOX  
TAKE ONE CAPSULE BY MOUTH TWICE A DAY * BD ULTRA-FINE II LANCETS 30 gauge Misc Generic drug:  lancets TEST DAILY OR AS DIRECTED * One Touch Delica 33 gauge Misc Generic drug:  lancets TEST DAILY OR AS DIRECTED BY PHYSICIAN Blood Pressure Monitor Kit Commonly known as:  BLOOD PRESSURE KIT Use 2-3 times a week or as directed Blood-Glucose Meter monitoring kit Commonly known as:  Lenard Suazo Test daily or as directed. butalbital-acetaminophen-caffeine -40 mg per tablet Commonly known as:  Brittni Mitts Take 1 Tab by mouth every four (4) hours as needed for Pain. cyclobenzaprine 10 mg tablet Commonly known as:  FLEXERIL  
TAKE ONE TABLET BY MOUTH THREE TIMES DAILY AS NEEDED FOR MUSCLE SPASM  
  
 fluticasone 50 mcg/actuation nasal spray Commonly known as:  Renteria Bharati 2 Sprays by Both Nostrils route daily. glipiZIDE 10 mg tablet Commonly known as:  GLUCOTROL  
TAKE ONE TABLET BY MOUTH TWICE A DAY  
  
 hydroCHLOROthiazide 25 mg tablet Commonly known as:  HYDRODIURIL  
TAKE ONE TABLET BY MOUTH DAILY **GENERIC FOR: HYDRODIURIL HYDROcodone-acetaminophen 5-325 mg per tablet Commonly known as:  NORCO  
  
 ibuprofen 800 mg tablet Commonly known as:  MOTRIN Take 1 Tab by mouth every eight (8) hours as needed for Pain. JANUMET -1,000 mg Tm24 Generic drug:  SITagliptin-metFORMIN  
TAKE ONE TABLET BY MOUTH DAILY (BEFORE DINNER) metoprolol succinate 200 mg XL tablet Commonly known as:  TOPROL-XL  
TAKE ONE TABLET BY MOUTH DAILY MUCINEX 1,200 mg Ta12 ER tablet Generic drug:  guaiFENesin Take  by mouth two (2) times a day. ONETOUCH ULTRA TEST strip Generic drug:  glucose blood VI test strips TEST DAILY OR AS DIRECTED OXcarbazepine 300 mg tablet Commonly known as:  TRILEPTAL  
TAKE ONE AND ONE-HALF TABLET BY MOUTH TWICE A DAY  
  
 rosuvastatin 40 mg tablet Commonly known as:  CRESTOR  
TAKE ONE TABLET BY MOUTH NIGHTLY * Notice: This list has 2 medication(s) that are the same as other medications prescribed for you. Read the directions carefully, and ask your doctor or other care provider to review them with you. Prescriptions Sent to Pharmacy Refills  
 ibuprofen (MOTRIN) 800 mg tablet 0 Sig: Take 1 Tab by mouth every eight (8) hours as needed for Pain. Class: Normal  
 Pharmacy: [facility] [address] [place] [address] #: [phone] Route: Oral  
  
We Performed the Following REFERRAL TO DIABETIC EDUCATION [REF20 Custom] Comments:  
 Uncontrolled diabetes type II. PLEASE CALL [facility] FOR DIABETIC EDUCATION -[phone]. Follow-up Instructions Return in about 3 months (around 8/12/2017) for diabetes/if back pain not better in one month. To-Do List   
 05/12/2017 Imaging:  XR SPINE LUMB MIN 4 V Referral Information Referral ID Referred By Referred To [id number] Stephanie Vieyra Not Available Visits Status Start Date End Date 1 New Request 5/12/17 5/12/18 If your referral has a status of pending review or denied, additional information will be sent to support the outcome of this decision. Referral ID Referred By Referred To  
 [id number] Stephanie Vieyra [facility]  
   [address] [address], [address] [facility] [address] Phone: [phone] Fax: [phone] Visits Status Start Date End Date 1 New Request 5/12/17 5/12/18 If your referral has a status of pending review or denied, additional information will be sent to support the outcome of this decision. Patient Instructions Learning About How to Have a Healthy Back What causes back pain? Back pain is often caused by overuse, strain, or injury. For example, people often hurt their backs playing sports or working in the yard, being jolted in a car accident, or lifting something too heavy. Aging plays a part too. Your bones and muscles tend to lose strength as you age, which makes injury more likely. The spongy discs between the bones of the spine (vertebrae) may suffer from wear and tear and no longer provide enough cushion between the bones. A disc that bulges or breaks open (herniated disc) can press on nerves, causing back pain. In some people, back pain is the result of arthritis, broken vertebrae caused by bone loss (osteoporosis), illness, or a spine problem. Although most people have back pain at one time or another, there are steps you can take to make it less likely. How can you have a healthy back? Reduce stress on your back through good posture Slumping or slouching alone may not cause low back pain. But after the back has been strained or injured, bad posture can make pain worse. · Sleep in a position that maintains your back's normal curves and on a mattress that feels comfortable.  Sleep on your side with a pillow between your knees, or sleep on your back with a pillow under your knees. These positions can reduce strain on your back. · Stand and sit up straight. \"Good posture\" generally means your ears, shoulders, and hips are in a straight line. · If you must stand for a long time, put one foot on a stool, ledge, or box. Switch feet every now and then. · Sit in a chair that is low enough to let you place both feet flat on the floor with both knees nearly level with your hips. If your chair or desk is too high, use a footrest to raise your knees. Place a small pillow, a rolled-up towel, or a lumbar roll in the curve of your back if you need extra support. · Try a kneeling chair, which helps tilt your hips forward. This takes pressure off your lower back. · Try sitting on an exercise ball. It can rock from side to side, which helps keep your back loose. · When driving, keep your knees nearly level with your hips. Sit straight, and drive with both hands on the steering wheel. Your arms should be in a slightly bent position. Reduce stress on your back through careful lifting · Squat down, bending at the hips and knees only. If you need to, put one knee to the floor and extend your other knee in front of you, bent at a right angle (half kneeling). · Press your chest straight forward. This helps keep your upper back straight while keeping a slight arch in your low back. · Hold the load as close to your body as possible, at the level of your belly button (navel). · Use your feet to change direction, taking small steps. · Lead with your hips as you change direction. Keep your shoulders in line with your hips as you move. · Set down your load carefully, squatting with your knees and hips only. Exercise and stretch your back · Do some exercise on most days of the week, if your doctor says it is okay. You can walk, run, swim, or cycle. · Stretch your back muscles. Here are a few exercises to try: ¨ Lie on your back, and gently pull one bent knee to your chest. Put that foot back on the floor, and then pull the other knee to your chest. 
¨ Do pelvic tilts. Lie on your back with your knees bent. Tighten your stomach muscles. Pull your belly button (navel) in and up toward your ribs. You should feel like your back is pressing to the floor and your hips and pelvis are slightly lifting off the floor. Hold for 6 seconds while breathing smoothly. ¨ Sit with your back flat against a wall. · Keep your core muscles strong. The muscles of your back, belly (abdomen), and buttocks support your spine. ¨ Pull in your belly and imagine pulling your navel toward your spine. Hold this for 6 seconds, then relax. Remember to keep breathing normally as you tense your muscles. ¨ Do curl-ups. Always do them with your knees bent. Keep your low back on the floor, and curl your shoulders toward your knees using a smooth, slow motion. Keep your arms folded across your chest. If this bothers your neck, try putting your hands behind your neck (not your head), with your elbows spread apart. ¨ Lie on your back with your knees bent and your feet flat on the floor. Tighten your belly muscles, and then push with your feet and raise your buttocks up a few inches. Hold this position 6 seconds as you continue to breathe normally, then lower yourself slowly to the floor. Repeat 8 to 12 times. ¨ If you like group exercise, try Pilates or yoga. These classes have poses that strengthen the core muscles. Lead a healthy lifestyle · Stay at a healthy weight to avoid strain on your back. · Do not smoke. Smoking increases the risk of osteoporosis, which weakens the spine. If you need help quitting, talk to your doctor about stop-smoking programs and medicines. These can increase your chances of quitting for good. Where can you learn more? Go to http://yasmin-lokesh.info/. Enter L315 in the search box to learn more about \"Learning About How to Have a Healthy Back. \" Current as of: May 23, 2016 Content Version: 11.2 © 0955-8990 [employer]. Care instructions adapted under license by [employer] (which disclaims liability or warranty for this information). If you have questions about a medical condition or this instruction, always ask your healthcare professional. [address] any warranty or liability for your use of this information. Therapeutic Ball: Back Exercises Your Care Instructions Here are some examples of typical exercises for your condition. Start each exercise slowly. Ease off the exercise if you start to have pain. Your doctor or physical therapist will tell you when you can start these exercises and which ones will work best for you. To prepare, make sure that your ball is the right size for you. When inflated and firm, it should allow you to sit with your hips and knees bent at about a 90-degree angle (like the letter L). How to do the exercises Seated position on ball Use this exercise to get used to moving on the ball and to find your best sitting position. 1. Sit comfortably on the ball with your feet about hip-width apart. If you feel unsteady, rest your hands on the ball near your hips. 2. As you do this exercise, try to keep your shoulders and upper body relaxed and still. 3. Using your stomach and back muscles to move your pelvis, roll the ball forward. This will round your back. 4. Still using your stomach and back muscles, roll the ball back. You will arch your back. 5. Repeat this rounding-arching motion a few times. 6. Stop in between the two positions, where your back is not rounded or arched. This is called your neutral position. Pelvic rotation 1. Sit tall on the ball. 2. Slowly rotate your hips in a [place] pattern. Keep the movement focused at your hips. 3. Repeat, but [place] in the other direction. 4. Repeat 8 to 12 times. Postural sitting Use this position to find a stable, relaxed posture on the ball. You can use this position as your starting point for other ball exercises. If you feel unsteady on the ball, start on a chair first. 
1. Sit on a ball or chair, with your feet planted straight in front of you. 2. Imagine that a string at the top of your head is pulling you straight up. Think of yourself as 2 inches taller than you are. 
3. Slightly tuck your chin. 4. Keep your shoulders back and relaxed. Knee extension 1. Sit tall on the ball with your feet planted in front of you, hip-width apart. As you do this exercise, avoid slumping your shoulders and arching your back. 2. Rest your hands on the ball near your hip or a steady object next to you. (If you feel very stable on the ball, rest your hands in your lap or at your side.) 3. Slowly straighten one leg at the knee. Slowly lower it back down. Repeat with the other leg. 4. Repeat this exercise 8 to 12 times. Roll-ups 1. Lie on your back with your knees bent, feet resting on the floor. 2. Lay the ball on your thighs. Rest your hands up high on the ball. 3. Raising your head and shoulder blades, roll the ball up your thighs. Exhale as you roll up. 4. If this is hard on your neck, gently support your lower head and upper neck with one hand. Don't use that hand to pull your head up. 5. Repeat 8 to 12 times. Ball curls 1. Lie on your back with your ankles resting on the ball, knees straight. 2. Use your legs to roll the exercise ball toward you. Allow your knees to bend and move closer to your chest. 
3. Pause briefly, and then roll the ball to the starting position. Try to keep the ball rolling straight. You will feel the muscles in your lower belly working. 4. Repeat 8 to 12 times. Bridge with ball under legs 1. Lie on your back with your legs up, calves resting on the ball.  For more challenge, rest your heels on the ball. 2. Look up at the ceiling, and keep your chin relaxed. You can place a small pillow under your head or neck for comfort. 3. With your arms by your side, press your hands onto the floor for stability. 4. Tighten your belly muscles by pulling in your belly button toward your spine. 5. Push your heels down toward the floor, squeeze your buttocks, and lift your hips off the floor until your shoulders, hips, and knees are all in a straight line. 6. Try to keep the ball steady. Hold for about 6 seconds as you continue to breathe normally. 7. Slowly lower your hips back down to the floor. 8. Repeat 8 to 12 times. Ball curls with bridge 1. Start flat on your back with your ankles resting on the ball. 2. Look up at the ceiling, and keep your chin relaxed. You can place a small pillow under your head or neck for comfort. 3. With your arms by your side, press your hands onto the floor for stability. 4. Tighten your belly muscles by pulling in your belly button toward your spine. 5. Push your heels down toward the floor, squeeze your buttocks, and lift your hips off the floor until your shoulders, hips, and knees are all in a straight line. 6. While holding the bridge position, roll the ball toward you with your heels. Keep your hips as level as you can. 7. Pause briefly, and then roll the ball back out. Try to keep the ball rolling straight. You will feel the muscles in your lower belly working as you straighten your legs. 8. Lower your hips, and return to your starting position. 9. Repeat 8 to 12 times. When you can keep your body and the ball steady throughout this exercise, you're ready for more challenge. Try keeping your hips raised while rolling the ball out, holding the bridge, and rolling back, a few times in a row. Praying august 1. Kneel upright with the ball in front of you. 2. To start, clasp your hands together.  Rest them on the ball in front of you. 
3. As you do this exercise, keep your back and hips straight and tighten your belly and buttocks muscles. Keep your knees in place. 4. Press on the ball with your arms. Lean forward from the knees. This rolls the ball forward. You will bear most of your weight on your arms. 5. If your back starts to ache, you've gone too far. Pull back a bit. 6. Roll back to the start position. 7. Repeat 8 to 12 times. Walk-out plank on ball 1. Kneel over the ball. Place your hands on the floor in front of you. 2. Walk your hands forward until your legs are straight on the ball. This is the plank position. 3. When in plank position, hold your body straight and tighten your belly and buttocks muscles. Keep your chin slightly tucked. 4. Roll as far forward as you can without losing your balance or letting your hips drop. You may stop with the ball under your thighs, or even under your knees or shins. 5. Hold a few seconds, then walk your hands back and return to the start position. 6. Repeat 8 to 12 times. Push-up with thighs on ball 1. Kneel over the ball. Place your hands on the floor in front of you. 2. Walk your hands forward until your legs are straight on the ball. This is the plank position. 3. When in plank position, hold your body straight and tighten your belly and buttocks muscles. Keep your chin slightly tucked. 4. Roll as far forward as you can without losing your balance or letting your hips drop. You may stop with the ball under your thighs, or even under your knees or shins. 5. Bend your elbows. Slowly lower your body toward the ground as far as you can without losing your balance. 6. If your wrists hurt, try moving your hands a little farther apart so they're not right under your shoulders. 7. Slowly straighten your arms. 8. Do 8 to 12 of these push-ups. Wall squat with ball 1. Stand facing away from a wall. Place your feet about shoulder-width apart. 2. Place the ball between your middle back and the wall. Move your feet out in front of you so they are about a foot in front of your hips. 3. Keep your arms at your sides, or put your hands on your hips. 4. Slowly squat down as if you are going to sit in a chair, rolling your back over the ball as you squat. The ball should move with you but stay pressed into the wall. 5. Be sure that your knees do not go in front of your toes as you squat. 6. Hold for 6 seconds. 7. Slowly rise to your standing position. 8. Repeat 8 to 12 times. Child's pose with ball 1. Kneeling upright with your back straight, rest your hands on the ball in front of you. 2. Breathe out as you bend at the hips, and roll the ball forward. Lower your chest toward the ground, and drop your hips back toward your heels. 3. To stretch your upper back and shoulders, hold this position for 15 to 30 seconds. 4. Repeat 2 to 4 times. Follow-up care is a key part of your treatment and safety. Be sure to make and go to all appointments, and call your doctor if you are having problems. It's also a good idea to know your test results and keep a list of the medicines you take. Where can you learn more? Go to http://yasmin-lokesh.info/. Enter I644 in the search box to learn more about \"Therapeutic Ball: Back Exercises. \" Current as of: May 23, 2016 Content Version: 11.2 © 8111-1225 [employer]. Care instructions adapted under license by [employer] (which disclaims liability or warranty for this information). If you have questions about a medical condition or this instruction, always ask your healthcare professional. [address] any warranty or liability for your use of this information. Introducing [facility]! Dear Valeda Boas: 
Thank you for requesting a [employer] account. Our records indicate that you already have an active [employer] account.   You can access your account anytime at [email or web address] Did you know that you can access your hospital and ER discharge instructions at any time in Revver? You can also review all of your test results from your hospital stay or ER visit. Additional Information If you have questions, please visit the Frequently Asked Questions section of the Revver website at [email or web address] Remember, Revver is NOT to be used for urgent needs. For medical emergencies, dial 911. Now available from your iPhone and Android! Please provide this summary of care documentation to your next provider. Your primary care clinician is listed as Kevin Brown. If you have any questions after today's visit, please call [phone].

## 2017-05-12 NOTE — PATIENT INSTRUCTIONS
Learning About How to Have a Healthy Back  What causes back pain? Back pain is often caused by overuse, strain, or injury. For example, people often hurt their backs playing sports or working in the yard, being jolted in a car accident, or lifting something too heavy. Aging plays a part too. Your bones and muscles tend to lose strength as you age, which makes injury more likely. The spongy discs between the bones of the spine (vertebrae) may suffer from wear and tear and no longer provide enough cushion between the bones. A disc that bulges or breaks open (herniated disc) can press on nerves, causing back pain. In some people, back pain is the result of arthritis, broken vertebrae caused by bone loss (osteoporosis), illness, or a spine problem. Although most people have back pain at one time or another, there are steps you can take to make it less likely. How can you have a healthy back? Reduce stress on your back through good posture  Slumping or slouching alone may not cause low back pain. But after the back has been strained or injured, bad posture can make pain worse. · Sleep in a position that maintains your back's normal curves and on a mattress that feels comfortable. Sleep on your side with a pillow between your knees, or sleep on your back with a pillow under your knees. These positions can reduce strain on your back. · Stand and sit up straight. \"Good posture\" generally means your ears, shoulders, and hips are in a straight line. · If you must stand for a long time, put one foot on a stool, ledge, or box. Switch feet every now and then. · Sit in a chair that is low enough to let you place both feet flat on the floor with both knees nearly level with your hips. If your chair or desk is too high, use a footrest to raise your knees. Place a small pillow, a rolled-up towel, or a lumbar roll in the curve of your back if you need extra support.   · Try a kneeling chair, which helps tilt your hips forward. This takes pressure off your lower back. · Try sitting on an exercise ball. It can rock from side to side, which helps keep your back loose. · When driving, keep your knees nearly level with your hips. Sit straight, and drive with both hands on the steering wheel. Your arms should be in a slightly bent position. Reduce stress on your back through careful lifting  · Squat down, bending at the hips and knees only. If you need to, put one knee to the floor and extend your other knee in front of you, bent at a right angle (half kneeling). · Press your chest straight forward. This helps keep your upper back straight while keeping a slight arch in your low back. · Hold the load as close to your body as possible, at the level of your belly button (navel). · Use your feet to change direction, taking small steps. · Lead with your hips as you change direction. Keep your shoulders in line with your hips as you move. · Set down your load carefully, squatting with your knees and hips only. Exercise and stretch your back  · Do some exercise on most days of the week, if your doctor says it is okay. You can walk, run, swim, or cycle. · Stretch your back muscles. Here are a few exercises to try:  [place] on your back, and gently pull one bent knee to your chest. Put that foot back on the floor, and then pull the other knee to your chest.  ¨ Do pelvic tilts. Lie on your back with your knees bent. Tighten your stomach muscles. Pull your belly button (navel) in and up toward your ribs. You should feel like your back is pressing to the floor and your hips and pelvis are slightly lifting off the floor. Hold for 6 seconds while breathing smoothly. ¨ Sit with your back flat against a wall. · Keep your core muscles strong. The muscles of your back, belly (abdomen), and buttocks support your spine. ¨ Pull in your belly and imagine pulling your navel toward your spine. Hold this for 6 seconds, then relax.  Remember to keep breathing normally as you tense your muscles. ¨ Do curl-ups. Always do them with your knees bent. Keep your low back on the floor, and curl your shoulders toward your knees using a smooth, slow motion. Keep your arms folded across your chest. If this bothers your neck, try putting your hands behind your neck (not your head), with your elbows spread apart. ¨ Lie on your back with your knees bent and your feet flat on the floor. Tighten your belly muscles, and then push with your feet and raise your buttocks up a few inches. Hold this position 6 seconds as you continue to breathe normally, then lower yourself slowly to the floor. Repeat 8 to 12 times. ¨ If you like group exercise, try Pilates or yoga. These classes have poses that strengthen the core muscles. Lead a healthy lifestyle  · Stay at a healthy weight to avoid strain on your back. · Do not smoke. Smoking increases the risk of osteoporosis, which weakens the spine. If you need help quitting, talk to your doctor about stop-smoking programs and medicines. These can increase your chances of quitting for good. Where can you learn more? Go to http://yasminuserfoxlokesh.info/. Enter L315 in the search box to learn more about \"Learning About How to Have a Healthy Back. \"  Current as of: May 23, 2016  Content Version: 11.2  © 8973-4871 [employer]. Care instructions adapted under license by [employer] (which disclaims liability or warranty for this information). If you have questions about a medical condition or this instruction, always ask your healthcare professional. [place] [address] any warranty or liability for your use of this information. Therapeutic Ball: Back Exercises  Your Care Instructions  Here are some examples of typical exercises for your condition. Start each exercise slowly. Ease off the exercise if you start to have pain.  Your doctor or physical therapist will tell you when you can start these exercises and which ones will work best for you. To prepare, make sure that your ball is the right size for you. When inflated and firm, it should allow you to sit with your hips and knees bent at about a 90-degree angle (like the letter L). How to do the exercises  Seated position on ball    Use this exercise to get used to moving on the ball and to find your best sitting position. 1. Sit comfortably on the ball with your feet about hip-width apart. If you feel unsteady, rest your hands on the ball near your hips. 2. As you do this exercise, try to keep your shoulders and upper body relaxed and still. 3. Using your stomach and back muscles to move your pelvis, roll the ball forward. This will round your back. 4. Still using your stomach and back muscles, roll the ball back. You will arch your back. 5. Repeat this rounding-arching motion a few times. 6. Stop in between the two positions, where your back is not rounded or arched. This is called your neutral position. Pelvic rotation    1. Sit tall on the ball. 2. Slowly rotate your hips in a [place] pattern. Keep the movement focused at your hips. 3. Repeat, but [place] in the other direction. 4. Repeat 8 to 12 times. Postural sitting    Use this position to find a stable, relaxed posture on the ball. You can use this position as your starting point for other ball exercises. If you feel unsteady on the ball, start on a chair first.  1. Sit on a ball or chair, with your feet planted straight in front of you. 2. Imagine that a string at the top of your head is pulling you straight up. Think of yourself as 2 inches taller than you are.  3. Slightly tuck your chin. 4. Keep your shoulders back and relaxed. Knee extension    1. Sit tall on the ball with your feet planted in front of you, hip-width apart. As you do this exercise, avoid slumping your shoulders and arching your back.   2. Rest your hands on the ball near your hip or a steady object next to you. (If you feel very stable on the ball, rest your hands in your lap or at your side.)  3. Slowly straighten one leg at the knee. Slowly lower it back down. Repeat with the other leg. 4. Repeat this exercise 8 to 12 times. Roll-ups    1. Lie on your back with your knees bent, feet resting on the floor. 2. Lay the ball on your thighs. Rest your hands up high on the ball. 3. Raising your head and shoulder blades, roll the ball up your thighs. Exhale as you roll up. 4. If this is hard on your neck, gently support your lower head and upper neck with one hand. Don't use that hand to pull your head up. 5. Repeat 8 to 12 times. Ball curls    1. Lie on your back with your ankles resting on the ball, knees straight. 2. Use your legs to roll the exercise ball toward you. Allow your knees to bend and move closer to your chest.  3. Pause briefly, and then roll the ball to the starting position. Try to keep the ball rolling straight. You will feel the muscles in your lower belly working. 4. Repeat 8 to 12 times. Bridge with ball under legs    1. Lie on your back with your legs up, calves resting on the ball. For more challenge, rest your heels on the ball. 2. Look up at the ceiling, and keep your chin relaxed. You can place a small pillow under your head or neck for comfort. 3. With your arms by your side, press your hands onto the floor for stability. 4. Tighten your belly muscles by pulling in your belly button toward your spine. 5. Push your heels down toward the floor, squeeze your buttocks, and lift your hips off the floor until your shoulders, hips, and knees are all in a straight line. 6. Try to keep the ball steady. Hold for about 6 seconds as you continue to breathe normally. 7. Slowly lower your hips back down to the floor. 8. Repeat 8 to 12 times. Ball curls with bridge    1. Start flat on your back with your ankles resting on the ball.   2. Look up at the ceiling, and keep your chin relaxed. You can place a small pillow under your head or neck for comfort. 3. With your arms by your side, press your hands onto the floor for stability. 4. Tighten your belly muscles by pulling in your belly button toward your spine. 5. Push your heels down toward the floor, squeeze your buttocks, and lift your hips off the floor until your shoulders, hips, and knees are all in a straight line. 6. While holding the bridge position, roll the ball toward you with your heels. Keep your hips as level as you can. 7. Pause briefly, and then roll the ball back out. Try to keep the ball rolling straight. You will feel the muscles in your lower belly working as you straighten your legs. 8. Lower your hips, and return to your starting position. 9. Repeat 8 to 12 times. When you can keep your body and the ball steady throughout this exercise, you're ready for more challenge. Try keeping your hips raised while rolling the ball out, holding the bridge, and rolling back, a few times in a row. Praying august    1. Kneel upright with the ball in front of you. 2. To start, clasp your hands together. Rest them on the ball in front of you. 3. As you do this exercise, keep your back and hips straight and tighten your belly and buttocks muscles. Keep your knees in place. 4. Press on the ball with your arms. Lean forward from the knees. This rolls the ball forward. You will bear most of your weight on your arms. 5. If your back starts to ache, you've gone too far. Pull back a bit. 6. Roll back to the start position. 7. Repeat 8 to 12 times. Walk-out plank on ball    1. Kneel over the ball. Place your hands on the floor in front of you. 2. Walk your hands forward until your legs are straight on the ball. This is the plank position. 3. When in plank position, hold your body straight and tighten your belly and buttocks muscles. Keep your chin slightly tucked.   4. Roll as far forward as you can without losing your balance or letting your hips drop. You may stop with the ball under your thighs, or even under your knees or shins. 5. Hold a few seconds, then walk your hands back and return to the start position. 6. Repeat 8 to 12 times. Push-up with thighs on ball    1. Kneel over the ball. Place your hands on the floor in front of you. 2. Walk your hands forward until your legs are straight on the ball. This is the plank position. 3. When in plank position, hold your body straight and tighten your belly and buttocks muscles. Keep your chin slightly tucked. 4. Roll as far forward as you can without losing your balance or letting your hips drop. You may stop with the ball under your thighs, or even under your knees or shins. 5. Bend your elbows. Slowly lower your body toward the ground as far as you can without losing your balance. 6. If your wrists hurt, try moving your hands a little farther apart so they're not right under your shoulders. 7. Slowly straighten your arms. 8. Do 8 to 12 of these push-ups. Wall squat with ball    1. Stand facing away from a wall. Place your feet about shoulder-width apart. 2. Place the ball between your middle back and the wall. Move your feet out in front of you so they are about a foot in front of your hips. 3. Keep your arms at your sides, or put your hands on your hips. 4. Slowly squat down as if you are going to sit in a chair, rolling your back over the ball as you squat. The ball should move with you but stay pressed into the wall. 5. Be sure that your knees do not go in front of your toes as you squat. 6. Hold for 6 seconds. 7. Slowly rise to your standing position. 8. Repeat 8 to 12 times. Child's pose with ball    1. Kneeling upright with your back straight, rest your hands on the ball in front of you. 2. Breathe out as you bend at the hips, and roll the ball forward. Lower your chest toward the ground, and drop your hips back toward your heels.   3. To stretch your upper back and shoulders, hold this position for 15 to 30 seconds. 4. Repeat 2 to 4 times. Follow-up care is a key part of your treatment and safety. Be sure to make and go to all appointments, and call your doctor if you are having problems. It's also a good idea to know your test results and keep a list of the medicines you take. Where can you learn more? Go to http://yasmin-lokesh.info/. Enter Y369 in the search box to learn more about \"Therapeutic Ball: Back Exercises. \"  Current as of: May 23, 2016  Content Version: 11.2  © 3655-0905 [employer]. Care instructions adapted under license by [employer] (which disclaims liability or warranty for this information). If you have questions about a medical condition or this instruction, always ask your healthcare professional. [address] any warranty or liability for your use of this information.

## 2017-05-12 NOTE — PROGRESS NOTES
HISTORY OF PRESENT ILLNESS  Ya Manjarrez is a 73 y.o. female. Diabetes   The history is provided by the patient (her blood sugars have been quite labile, from about 200 to 118). This is a chronic problem. The current episode started more than 1 week ago. The problem occurs constantly. The problem has been gradually worsening. Pertinent negatives include no chest pain, no abdominal pain, no headaches and no shortness of breath. Nothing aggravates the symptoms. The symptoms are relieved by medications. Treatments tried: glipizide, Janumet. The treatment provided mild relief. Hip Pain   The history is provided by the patient (left buttock). This is a new problem. Episode onset: about a month ago. The problem occurs constantly. The problem has been gradually worsening. Pertinent negatives include no chest pain, no abdominal pain, no headaches and no shortness of breath. Associated symptoms comments: The pain does not radiate. Exacerbated by: moving about. The symptoms are relieved by medications. Treatments tried: Motrin. The treatment provided mild relief. Hypertension   The history is provided by the patient (her blood pressure is still elevated. She has a nephrology appointment in July. ). Pertinent negatives include no chest pain, no abdominal pain, no headaches and no shortness of breath. Review of Systems   Constitutional: Negative for chills, fever and weight loss. No weight gain   Eyes: Negative for blurred vision. Respiratory: Negative for shortness of breath. Cardiovascular: Negative for chest pain and leg swelling. Gastrointestinal: Negative for abdominal pain. No bowel incontinence   Genitourinary: Negative for frequency. No bladder incontinence   Musculoskeletal: Positive for back pain. Negative for falls. Neurological: Negative for dizziness, tingling, sensory change, speech change, focal weakness and headaches. Endo/Heme/Allergies: Negative for polydipsia. Visit Vitals    /71 (BP 1 Location: Left arm, BP Patient Position: Sitting)    Pulse 65    Temp 98.7 °F (37.1 °C) (Oral)    Resp 20    Ht 5' 4\" (1.626 m)    Wt 237 lb (107.5 kg)    BMI 40.68 kg/m2     BP Readings from Last 3 Encounters:   05/12/17 160/71   04/06/17 156/63   01/27/17 142/86     Physical Exam   Constitutional: She is oriented to person, place, and time. She appears well-developed and well-nourished. No distress. Cardiovascular: Normal rate and regular rhythm. Exam reveals no gallop and no friction rub. Murmur heard. Pulmonary/Chest: Effort normal and breath sounds normal. No respiratory distress. She has no wheezes. She has no rales. Musculoskeletal: She exhibits no edema. Lumbar back: She exhibits decreased range of motion, tenderness and pain. She exhibits no bony tenderness and no spasm. Back:    There is tenderness at L1   Neurological: She is alert and oriented to person, place, and time. She has normal strength. No sensory deficit. Gait normal.   Reflex Scores:       Patellar reflexes are 2+ on the right side and 2+ on the left side. Achilles reflexes are 2+ on the right side and 2+ on the left side. Negative SLRs   Skin: Skin is warm and dry. She is not diaphoretic. Nursing note and vitals reviewed. ASSESSMENT and PLAN    ICD-10-CM ICD-9-CM    1. Type 2 diabetes with nephropathy (HCC) E11.21 250.40 REFERRAL TO DIABETIC EDUCATION     583.81    2. Left-sided low back pain without sciatica, unspecified chronicity M54.5 724.2 ibuprofen (MOTRIN) 800 mg tablet      CANCELED: XR SPINE LUMB MIN 4 V   3. Essential hypertension I10 401.9    4.  Financial difficulties Z59.8 V60.2         Uncontrolled diabetes with labile blood sugars  Back/buttock pain  Refractory HTN  Diabetic ed referral  Motrin, heat prn  L spine x-ray  Exercises for low back pain given  Keep Nephrology appointment for her blood pressure    Follow-up Disposition:  Return in about 3 months (around 8/12/2017) for diabetes/if back pain not better in one month. Reviewed plan of care. Patient has provided input and agrees with goals.

## 2017-05-12 NOTE — PROGRESS NOTES
Chief Complaint   Patient presents with    Diabetes     follow up    Hip Pain     left hip pain-10/10

## 2017-06-25 RX ORDER — AMLODIPINE AND VALSARTAN 10; 320 MG/1; MG/1
TABLET ORAL
Qty: 90 TAB | Refills: 0 | Status: SHIPPED | OUTPATIENT
Start: 2017-06-25 | End: 2017-09-28 | Stop reason: SDUPTHER

## 2017-07-12 RX ORDER — METOPROLOL SUCCINATE 200 MG/1
TABLET, EXTENDED RELEASE ORAL
Qty: 90 TAB | Refills: 0 | Status: SHIPPED | OUTPATIENT
Start: 2017-07-12 | End: 2020-05-14 | Stop reason: SDUPTHER

## 2017-07-16 DIAGNOSIS — E78.00 ELEVATED CHOLESTEROL: ICD-10-CM

## 2017-07-17 RX ORDER — ROSUVASTATIN CALCIUM 40 MG/1
TABLET, COATED ORAL
Qty: 30 TAB | Refills: 4 | Status: SHIPPED | OUTPATIENT
Start: 2017-07-17 | End: 2018-04-25

## 2017-07-21 RX ORDER — INSULIN PUMP SYRINGE, 3 ML
EACH MISCELLANEOUS
Qty: 1 KIT | Refills: 0 | Status: SHIPPED | OUTPATIENT
Start: 2017-07-21 | End: 2018-06-18 | Stop reason: SDUPTHER

## 2017-08-08 ENCOUNTER — OFFICE VISIT (OUTPATIENT)
Dept: FAMILY MEDICINE CLINIC | Age: 72
End: 2017-08-08

## 2017-08-08 VITALS
WEIGHT: 232 LBS | DIASTOLIC BLOOD PRESSURE: 98 MMHG | HEIGHT: 64 IN | TEMPERATURE: 98.4 F | BODY MASS INDEX: 39.61 KG/M2 | SYSTOLIC BLOOD PRESSURE: 176 MMHG | RESPIRATION RATE: 20 BRPM | HEART RATE: 65 BPM

## 2017-08-08 DIAGNOSIS — Z71.89 ACP (ADVANCE CARE PLANNING): ICD-10-CM

## 2017-08-08 DIAGNOSIS — M54.50 CHRONIC BILATERAL LOW BACK PAIN WITHOUT SCIATICA: ICD-10-CM

## 2017-08-08 DIAGNOSIS — R32 URINARY INCONTINENCE, UNSPECIFIED TYPE: ICD-10-CM

## 2017-08-08 DIAGNOSIS — Z00.00 ROUTINE GENERAL MEDICAL EXAMINATION AT A HEALTH CARE FACILITY: ICD-10-CM

## 2017-08-08 DIAGNOSIS — Z13.39 SCREENING FOR ALCOHOLISM: ICD-10-CM

## 2017-08-08 DIAGNOSIS — Z13.0 SCREENING FOR BLOOD DISEASE: ICD-10-CM

## 2017-08-08 DIAGNOSIS — I10 ESSENTIAL HYPERTENSION: ICD-10-CM

## 2017-08-08 DIAGNOSIS — G89.29 CHRONIC BILATERAL LOW BACK PAIN WITHOUT SCIATICA: ICD-10-CM

## 2017-08-08 DIAGNOSIS — E11.21 TYPE 2 DIABETES WITH NEPHROPATHY (HCC): Primary | ICD-10-CM

## 2017-08-08 RX ORDER — TRAMADOL HYDROCHLORIDE 50 MG/1
50 TABLET ORAL
Qty: 21 TAB | Refills: 0 | Status: SHIPPED | OUTPATIENT
Start: 2017-08-08 | End: 2018-02-13

## 2017-08-08 RX ORDER — TIZANIDINE HYDROCHLORIDE 2 MG/1
2 CAPSULE, GELATIN COATED ORAL
Qty: 40 CAP | Refills: 2 | Status: SHIPPED | OUTPATIENT
Start: 2017-08-08 | End: 2018-04-25

## 2017-08-08 RX ORDER — SOLIFENACIN SUCCINATE 5 MG/1
5 TABLET, FILM COATED ORAL DAILY
Qty: 30 TAB | Refills: 11 | Status: SHIPPED | OUTPATIENT
Start: 2017-08-08 | End: 2018-07-25

## 2017-08-08 NOTE — PATIENT INSTRUCTIONS
Health Maintenance   Topic Date Due    DTaP/Tdap/Td series (1 - Tdap) 10/[birth year]/1968    ZOSTER VACCINE AGE 60>  08/08/2007    EYE EXAM RETINAL OR DILATED Q1  04/25/2017    INFLUENZA AGE 9 TO ADULT  08/01/2017    HEMOGLOBIN A1C Q6M  11/01/2017    LIPID PANEL Q1  11/09/2017    FOOT EXAM Q1  12/06/2017    Bone Densitometry  03/21/2018    BREAST CANCER SCRN MAMMOGRAM  03/21/2018    MICROALBUMIN Q1  04/06/2018    MEDICARE YEARLY EXAM  04/07/2018    GLAUCOMA SCREENING Q2Y  04/25/2018    COLONOSCOPY  06/06/2026    Hepatitis C Screening  Completed    Pneumococcal 65+ Low/Medium Risk  Completed

## 2017-08-08 NOTE — MR AVS SNAPSHOT
Visit Information Date & Time Provider Department Dept. Phone Encounter #  
 8/8/2017  8:45 AM Bakari IrmaRudy 34 [id number] Upcoming Health Maintenance Date Due DTaP/Tdap/Td series (1 - Tdap) 10/8/1968 ZOSTER VACCINE AGE 60> 8/8/2007 EYE EXAM RETINAL OR DILATED Q1 4/25/2017 INFLUENZA AGE 9 TO ADULT 8/1/2017 HEMOGLOBIN A1C Q6M 11/1/2017 LIPID PANEL Q1 11/9/2017 FOOT EXAM Q1 12/6/2017 Bone Densitometry 3/21/2018 BREAST CANCER SCRN MAMMOGRAM 3/21/2018 MICROALBUMIN Q1 4/6/2018 MEDICARE YEARLY EXAM 4/7/2018 GLAUCOMA SCREENING Q2Y 4/25/2018 COLONOSCOPY 6/6/2026 Allergies as of 8/8/2017  Review Complete On: 8/8/2017 By: Bakari Solis MD  
  
 Severity Noted Reaction Type Reactions Hydroxyzine  12/06/2016    Other (comments) Caused memory loss Levaquin [Levofloxacin]  02/21/2016    Diarrhea Lipitor [Atorvastatin]  12/11/2014   Side Effect Myalgia Lisinopril  12/04/2012   Side Effect Cough Penicillins  03/08/2011    Rash  
 Sulfa (Sulfonamide Antibiotics)  03/08/2011    Rash  
 Zoloft [Sertraline]  05/10/2016    Diarrhea Current Immunizations  Reviewed on 12/6/2016 Name Date Influenza Vaccine 10/29/2013 Influenza Vaccine (Quad) PF 12/18/2015 Influenza Vaccine Split 11/16/2012, 9/21/2011 10:00 AM  
 Pneumococcal Polysaccharide (PPSV-23) 12/4/2012 ZZZ-RETIRED (DO NOT USE) Pneumococcal Vaccine (Unspecified Type) 3/21/2012 Not reviewed this visit You Were Diagnosed With   
  
 Codes Comments Type 2 diabetes with nephropathy (HCC)    -  Primary ICD-10-CM: E11.21 
ICD-9-CM: 250.40, 583.81 Essential hypertension     ICD-10-CM: I10 
ICD-9-CM: 401.9 Chronic bilateral low back pain without sciatica     ICD-10-CM: M54.5, G89.29 ICD-9-CM: 724.2, 338.29  Urinary incontinence, unspecified type     ICD-10-CM: R32 
ICD-9-CM: 788.30   
 Routine general medical examination at a health care facility     ICD-10-CM: Z00.00 ICD-9-CM: V70.0 Screening for blood disease     ICD-10-CM: Z13.0 ICD-9-CM: V78.9 Screening for alcoholism     ICD-10-CM: Z13.89 ICD-9-CM: V79.1 ACP (advance care planning)     ICD-10-CM: Z71.89 ICD-9-CM: V65.49 Vitals BP Pulse Temp Resp Height(growth percentile) Weight(growth percentile) (!) 176/98 65 98.4 °F (36.9 °C) (Oral) 20 5' 4\" (1.626 m) 232 lb (105.2 kg) BMI OB Status Smoking Status 39.82 kg/m2 Hysterectomy Never Smoker Vitals History BMI and BSA Data Body Mass Index Body Surface Area  
 39.82 kg/m 2 2.18 m 2 Preferred Pharmacy Pharmacy Name Phone [place] [phone] Your Updated Medication List  
  
   
This list is accurate as of: 8/8/17  9:36 AM.  Always use your most recent med list. amLODIPine-valsartan  mg per tablet Commonly known as:  EXFORGE  
TAKE ONE TABLET BY MOUTH DAILY  
  
 aspirin-dipyridamole  mg per SR capsule Commonly known as:  AGGRENOX  
TAKE ONE CAPSULE BY MOUTH TWICE A DAY * BD ULTRA-FINE II LANCETS 30 gauge Misc Generic drug:  lancets TEST DAILY OR AS DIRECTED * One Touch Delica 33 gauge Misc Generic drug:  lancets TEST DAILY OR AS DIRECTED BY PHYSICIAN Blood Pressure Monitor Kit Commonly known as:  BLOOD PRESSURE KIT Use 2-3 times a week or as directed * Blood-Glucose Meter monitoring kit Commonly known as:  Samantha Sandskerichck Test daily or as directed. * Blood-Glucose Meter monitoring kit Commonly known as:  [place] Test blood sugars daily. fluticasone 50 mcg/actuation nasal spray Commonly known as:  [place] Para 2 Sprays by Both Nostrils route daily. glipiZIDE 10 mg tablet Commonly known as:  GLUCOTROL  
TAKE ONE TABLET BY MOUTH TWICE A DAY  
  
 hydroCHLOROthiazide 25 mg tablet Commonly known as:  HYDRODIURIL  
TAKE ONE TABLET BY MOUTH DAILY **GENERIC FOR: HYDRODIURIL  
  
 JANUMET -1,000 mg Tm24 Generic drug:  SITagliptin-metFORMIN  
TAKE ONE TABLET BY MOUTH DAILY (BEFORE DINNER) metoprolol succinate 200 mg XL tablet Commonly known as:  TOPROL-XL  
TAKE ONE TABLET BY MOUTH DAILY MUCINEX 1,200 mg Ta12 ER tablet Generic drug:  guaiFENesin Take  by mouth two (2) times a day. ONETOUCH ULTRA TEST strip Generic drug:  glucose blood VI test strips TEST DAILY OR AS DIRECTED OXcarbazepine 300 mg tablet Commonly known as:  TRILEPTAL  
TAKE ONE AND ONE-HALF TABLET BY MOUTH TWICE A DAY  
  
 rosuvastatin 40 mg tablet Commonly known as:  CRESTOR  
TAKE ONE TABLET BY MOUTH NIGHTLY  
  
 solifenacin 5 mg tablet Commonly known as:  VESIcare Take 1 Tab by mouth daily. tiZANidine 2 mg capsule Commonly known as:  Shayna Hush Take 1 Cap by mouth three (3) times daily as needed. For pain  
  
 traMADol 50 mg tablet Commonly known as:  ULTRAM  
Take 1 Tab by mouth every six (6) hours as needed for Pain. Max Daily Amount: 200 mg.  
  
 * Notice: This list has 4 medication(s) that are the same as other medications prescribed for you. Read the directions carefully, and ask your doctor or other care provider to review them with you. Prescriptions Printed Refills  
 traMADol (ULTRAM) 50 mg tablet 0 Sig: Take 1 Tab by mouth every six (6) hours as needed for Pain. Max Daily Amount: 200 mg. Class: Print Route: Oral  
  
Prescriptions Sent to Pharmacy Refills  
 solifenacin (VESICARE) 5 mg tablet 11 Sig: Take 1 Tab by mouth daily. Class: Normal  
 Pharmacy: [facility] [id number] [facility], [address] [place] [address], [address] #: [phone] Route: Oral  
 tiZANidine (ZANAFLEX) 2 mg capsule 2 Sig: Take 1 Cap by mouth three (3) times daily as needed.  For pain  
 Class: Normal  
 [facility]  #: [phone] Route: Oral  
  
We Performed the Following CBC WITH AUTOMATED DIFF [27827 CPT(R)] HEMOGLOBIN A1C WITH EAG [88267 CPT(R)] METABOLIC PANEL, COMPREHENSIVE [74394 CPT(R)] REFERRAL TO PHYSICAL THERAPY [QUO18 Custom] Comments:  
 Please evaluate and treat for chronic low back pain Referral Information Referral ID Referred By Referred To  
  
 [id number] [facility], [address] [place] [address] [address] [place], [address] [place] [address] Phone: [phone] Fax: [phone] Visits Status Start Date End Date 1 New Request 8/8/17 8/8/18 If your referral has a status of pending review or denied, additional information will be sent to support the outcome of this decision. Patient Instructions Health Maintenance Topic Date Due  
 DTaP/Tdap/Td series (1 - Tdap) 10/08/1968  ZOSTER VACCINE AGE 60>  08/08/2007  
 EYE EXAM RETINAL OR DILATED Q1  04/25/2017  INFLUENZA AGE 9 TO ADULT  08/01/2017  
 HEMOGLOBIN A1C Q6M  11/01/2017  LIPID PANEL Q1  11/09/2017  
 FOOT EXAM Q1  12/06/2017  Bone Densitometry  03/21/2018  BREAST CANCER SCRN MAMMOGRAM  03/21/2018  MICROALBUMIN Q1  04/06/2018  MEDICARE YEARLY EXAM  04/07/2018  GLAUCOMA SCREENING Q2Y  04/25/2018  COLONOSCOPY  06/06/2026  Hepatitis C Screening  Completed  Pneumococcal 65+ Low/Medium Risk  Completed [facility]! Dear Nacho Bueno: 
Thank you for requesting a [employer] account. Our records indicate that you already have an active [employer] account. You can access your account anytime at [email or web address] Did you know that you can access your hospital and ER discharge instructions at any time in [employer]? You can also review all of your test results from your hospital stay or ER visit. Additional Information If you have questions, please visit the Frequently Asked Questions section of the [email or web address] website at [email or web address] Remember, [employer] is NOT to be used for urgent needs. For medical emergencies, dial 911. Now available from your iPhone and Android! Please provide this summary of care documentation to your next provider. Your primary care clinician is listed as Joslyn Reyes. If you have any questions after today's visit, please call [phone].

## 2017-08-08 NOTE — PROGRESS NOTES
HISTORY OF PRESENT ILLNESS  Pretty Gutierres is a 73 y.o. female. Diabetes   The history is provided by the patient (she saw renal for her blood pressure last monht. Her FBS have been in the 130;s.). This is a chronic problem. The current episode started more than 1 week ago. The problem occurs constantly. The problem has been gradually improving. Pertinent negatives include no chest pain, no abdominal pain, no headaches and no shortness of breath. Associated symptoms comments: She wakes up at night choking and gagging. She has seen ENT and GI for this without success. . Nothing aggravates the symptoms. The symptoms are relieved by medications. Treatments tried: glipizide, Janumet. Back Pain    The history is provided by the patient. This is a chronic problem. Episode onset: over a year ago. The problem has been gradually worsening. The problem occurs constantly. The pain is associated with no known injury. The pain is present in the lower back and generalized. The quality of the pain is described as dull and aching. The pain does not radiate. The pain is moderate. Exacerbated by: trying to get worse. Associated symptoms include weight loss, bowel incontinence and bladder incontinence. Pertinent negatives include no chest pain, no fever, no numbness, no headaches, no abdominal pain, no tingling and no weakness. Treatments tried: BC's. The treatment provided significant relief. The patient's surgical history non-contributory   Other   The history is provided by the patient (she has urinary incontinenece. I had given her medication for this in the past, which worked. ). Pertinent negatives include no chest pain, no abdominal pain, no headaches and no shortness of breath. Review of Systems   Constitutional: Positive for weight loss. Negative for chills and fever. No weight gain   Eyes: Negative for blurred vision. Respiratory: Negative for shortness of breath.     Cardiovascular: Positive for leg swelling. Negative for chest pain. Gastrointestinal: Positive for bowel incontinence. Negative for abdominal pain. Chronic bowel incontinence   Genitourinary: Positive for bladder incontinence. Polyuria. Chronic urinary incontinence. Musculoskeletal: Positive for back pain. Negative for falls. Neurological: Negative for dizziness, tingling, sensory change, speech change, focal weakness, weakness, numbness and headaches. Endo/Heme/Allergies: Negative for polydipsia. Visit Vitals    BP (!) 176/98    Pulse 65    Temp 98.4 °F (36.9 °C) (Oral)    Resp 20    Ht 5' 4\" (1.626 m)    Wt 232 lb (105.2 kg)    BMI 39.82 kg/m2     BP Readings from Last 3 Encounters:   08/08/17 (!) 176/98   05/12/17 160/71   04/06/17 156/63     Physical Exam   Constitutional: She is oriented to person, place, and time. She appears well-developed and well-nourished. No distress. Cardiovascular: Normal rate, regular rhythm and normal heart sounds. Exam reveals no gallop and no friction rub. No murmur heard. Pulmonary/Chest: Effort normal and breath sounds normal. No respiratory distress. She has no wheezes. She has no rales. Musculoskeletal: She exhibits no edema. Lumbar back: She exhibits decreased range of motion, tenderness, bony tenderness and pain. She exhibits no spasm. Back:    Neurological: She is alert and oriented to person, place, and time. She has normal strength. No sensory deficit. Gait normal.   Reflex Scores:       Patellar reflexes are 2+ on the right side and 2+ on the left side. Achilles reflexes are 2+ on the right side and 2+ on the left side. Negative SLRs   Skin: Skin is warm and dry. She is not diaphoretic. Nursing note and vitals reviewed.           This is a Subsequent Medicare Annual Wellness Visit providing Personalized Prevention Plan Services (PPPS) (Performed 12 months after initial AWV and PPPS )    I have reviewed the patient's medical history in detail and updated the computerized patient record. History     Past Medical History:   Diagnosis Date    ACP (advance care planning) 2/9/2016    Patient plans to prepare an advanced directive and bring it in   [facility]    Cataracts, bilateral 8/1/2011    Depression, major, single episode, moderate ([facility] 8/1/2011    Sees Dr Evelyne Ellis    Diabetic nephropathy ([facility] 8/1/2011    Essential hypertension     Financial difficulties 7/30/2012    Hyperlipidemia     Obesity     SKYLER (obstructive sleep apnea) 2/9/2016    Pregnancy 8/1/2011    Stroke ([facility]     2004 ARABELLA    TIA (transient ischemic attack) 8/1/2011      Past Surgical History:   Procedure Laterality Date    HX GYN      HX HYSTERECTOMY      over 25 years    HX MYOMECTOMY      oiver 30 years     Current Outpatient Prescriptions   Medication Sig Dispense Refill    solifenacin (VESICARE) 5 mg tablet Take 1 Tab by mouth daily. 30 Tab 11    Blood-Glucose Meter (ONETOUCH ULTRAMINI) monitoring kit Test blood sugars daily.  1 Kit 0    rosuvastatin (CRESTOR) 40 mg tablet TAKE ONE TABLET BY MOUTH NIGHTLY 30 Tab 4    metoprolol succinate (TOPROL-XL) 200 mg XL tablet TAKE ONE TABLET BY MOUTH DAILY 90 Tab 0    amLODIPine-valsartan (EXFORGE)  mg per tablet TAKE ONE TABLET BY MOUTH DAILY 90 Tab 0    glipiZIDE (GLUCOTROL) 10 mg tablet TAKE ONE TABLET BY MOUTH TWICE A DAY 60 Tab 5    JANUMET -1,000 mg TM24 TAKE ONE TABLET BY MOUTH DAILY (BEFORE DINNER) 30 Tab 5    OXcarbazepine (TRILEPTAL) 300 mg tablet TAKE ONE AND ONE-HALF TABLET BY MOUTH TWICE A DAY 90 Tab 11    ONE TOUCH DELICA 33 gauge misc TEST DAILY OR AS DIRECTED BY PHYSICIAN 100 Lancet prn    ONETOUCH ULTRA TEST strip TEST DAILY OR AS DIRECTED 100 Strip PRN    hydrochlorothiazide (HYDRODIURIL) 25 mg tablet TAKE ONE TABLET BY MOUTH DAILY **GENERIC FOR: HYDRODIURIL 90 Tab 3    aspirin-dipyridamole (AGGRENOX)  mg per SR capsule TAKE ONE CAPSULE BY MOUTH TWICE A DAY 60 Cap 11    Blood Pressure Monitor (BLOOD PRESSURE KIT) kit Use 2-3 times a week or as directed 1 Kit 0    fluticasone (FLONASE) 50 mcg/actuation nasal spray 2 Sprays by Both Nostrils route daily. 1 Bottle 11    guaiFENesin (MUCINEX) 1,200 mg Ta12 ER tablet Take  by mouth two (2) times a day.  Blood-Glucose Meter (ONETOUCH ULTRAMINI) monitoring kit Test daily or as directed.  1 Kit 0    cyclobenzaprine (FLEXERIL) 10 mg tablet TAKE ONE TABLET BY MOUTH THREE TIMES DAILY AS NEEDED FOR MUSCLE SPASM 90 Tab 0    BD ULTRA-FINE II LANCETS 30 gauge misc TEST DAILY OR AS DIRECTED 200 Package prn     Allergies   Allergen Reactions    Hydroxyzine Other (comments)     Caused memory loss    Levaquin [Levofloxacin] Diarrhea    Lipitor [Atorvastatin] Myalgia    Lisinopril Cough    Penicillins Rash    Sulfa (Sulfonamide Antibiotics) Rash    Zoloft [Sertraline] Diarrhea     Family History   Problem Relation Age of Onset    Hypertension Mother     Seizures Mother     Hypertension Son     Diabetes Son     Elevated Lipids Son     Ataxia Daughter     Seizures Brother     Cancer Maternal Aunt      Social History   Substance Use Topics    Smoking status: Never Smoker    Smokeless tobacco: Never Used    Alcohol use 0.6 oz/week     1 Standard drinks or equivalent per week      Comment: occasionally     Patient Active Problem List   Diagnosis Code    Elevated cholesterol E78.00    Cataracts, bilateral H26.9    TIA (transient ischemic attack) G45.9    Depression, major, single episode, moderate (HCC) F32.1    Diabetic nephropathy (HCC) E11.21    Urinary incontinence R32    Essential hypertension I10    Financial difficulties Z59.8    Snoring R06.83    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 No    Do you average more than 7 drinks per week? No        Functional Ability and Level of Safety:     Hearing Loss   none    Activities of Daily Living   Self-care. Requires assistance with: no ADLs    Fall Risk   Fall Risk Assessment, last 12 mths 8/8/2017   Able to walk? Yes   Fall in past 12 months? No     Abuse Screen   Patient is not abused    Review of Systems   No other complaints    Physical Examination     Evaluation of Cognitive Function:  Mood/affect:  happy  Appearance: age appropriate  Family member/caregiver input: none    General appearance: alert, cooperative, no distress, appears stated age    Patient Care Team:  Joann Carter MD as PCP - General (Family Practice)  Cassia Kent MD (Orthopedic Surgery)  Zoë Cui LPN as Nurse Nato Gabriel MD (Cardiology)  Tino Plummer MD (Nephrology)  Amy Abernathy MD (Neurology)  Bry Brooks MD as Physician (Sleep Medicine)  Kristal Guidry MD (Gastroenterology)    Advice/Referrals/Counseling   Education and counseling provided:  Are appropriate based on today's review and evaluation  End-of-Life planning (with patient's consent)  Patient plans to complete an advanced directive and bring it in  [address] was discussed but the patient did not wish or was not able to name a surrogate decision maker or provide an [address]     Assessment/Plan       ICD-10-CM ICD-9-CM    1. Type 2 diabetes with nephropathy (HCC) Q05.99 912.21 METABOLIC PANEL, COMPREHENSIVE     583.81 HEMOGLOBIN A1C WITH EAG   2. Essential hypertension N13 873.7 METABOLIC PANEL, COMPREHENSIVE   3.  Chronic bilateral low back pain without sciatica M54.5 724.2 REFERRAL TO PHYSICAL THERAPY    G89.29 338.29 traMADol (ULTRAM) 50 mg tablet      tiZANidine (ZANAFLEX) 2 mg capsule   4. Urinary incontinence, unspecified type R32 788.30 solifenacin (VESICARE) 5 mg tablet   5. Routine general medical examination at a health care facility Z00.00 V70.0    6. Screening for blood disease Z13.0 V78.9 CBC WITH AUTOMATED DIFF   7. Screening for alcoholism Z13.89 V79.1    8. ACP (advance care planning) Z71.89 V65.49    . Uncertain diabetic control  Blood pressure elevated, being followed by renal  Back pain, BCs usually work, but requests an Ultram refill for days that they don't  She is having urinary incontinence and would like to go back on Vesicare  Labs per orders. Follow up with renal on her blood pressure as planned  I have given her a limited supply of Ultram to use prn for severe pain,  web site reviewed. Tizanidine prn  PT referral  Restart Vesicare    Follow-up Disposition:  Return in about 6 weeks (around 9/19/2017) for back pain, PT follow up. Reviewed plan of care. Patient has provided input and agrees with goals.

## 2017-08-08 NOTE — PROGRESS NOTES
Chief Complaint   Patient presents with    Diabetes     follow up     Back Pain     follow up - still having pain      Health Maintenance   Topic Date Due    DTaP/Tdap/Td series (1 - Tdap) 10/08/1968    ZOSTER VACCINE AGE 60>  08/08/2007    EYE EXAM RETINAL OR DILATED Q1  04/25/2017    INFLUENZA AGE 9 TO ADULT  08/01/2017    HEMOGLOBIN A1C Q6M  11/01/2017    LIPID PANEL Q1  11/09/2017    FOOT EXAM Q1  12/06/2017    Bone Densitometry  03/21/2018    BREAST CANCER SCRN MAMMOGRAM  03/21/2018    MICROALBUMIN Q1  04/06/2018    MEDICARE YEARLY EXAM  04/07/2018    GLAUCOMA SCREENING Q2Y  04/25/2018    COLONOSCOPY  06/06/2026    Hepatitis C Screening  Completed    Pneumococcal 65+ Low/Medium Risk  Completed     1. Have you been to the ER, urgent care clinic since your last visit? Hospitalized since your last visit? No    2. Have you seen or consulted any other health care providers outside of the [address] [place] [address] since your last visit? Include any pap smears or colon screening.  No

## 2017-08-09 LAB
ALBUMIN SERPL-MCNC: 4.8 G/DL (ref 3.6–4.8)
ALBUMIN/GLOB SERPL: 1.5 {RATIO} (ref 1.2–2.2)
ALP SERPL-CCNC: 83 IU/L (ref 39–117)
ALT SERPL-CCNC: 13 IU/L (ref 0–32)
AST SERPL-CCNC: 16 IU/L (ref 0–40)
BASOPHILS # BLD AUTO: 0 X10E3/UL (ref 0–0.2)
BASOPHILS NFR BLD AUTO: 0 %
BILIRUB SERPL-MCNC: 0.2 MG/DL (ref 0–1.2)
BUN SERPL-MCNC: 11 MG/DL (ref 8–27)
BUN/CREAT SERPL: 15 (ref 12–28)
CALCIUM SERPL-MCNC: 9.6 MG/DL (ref 8.7–10.3)
CHLORIDE SERPL-SCNC: 96 MMOL/L (ref 96–106)
CO2 SERPL-SCNC: 27 MMOL/L (ref 18–29)
CREAT SERPL-MCNC: 0.75 MG/DL (ref 0.57–1)
EOSINOPHIL # BLD AUTO: 0.1 X10E3/UL (ref 0–0.4)
EOSINOPHIL NFR BLD AUTO: 1 %
ERYTHROCYTE [DISTWIDTH] IN BLOOD BY AUTOMATED COUNT: 16.8 % (ref 12.3–15.4)
EST. AVERAGE GLUCOSE BLD GHB EST-MCNC: 166 MG/DL
GLOBULIN SER CALC-MCNC: 3.2 G/DL (ref 1.5–4.5)
GLUCOSE SERPL-MCNC: 140 MG/DL (ref 65–99)
HBA1C MFR BLD: 7.4 % (ref 4.8–5.6)
HCT VFR BLD AUTO: 38.3 % (ref 34–46.6)
HGB BLD-MCNC: 12 G/DL (ref 11.1–15.9)
IMM GRANULOCYTES # BLD: 0 X10E3/UL (ref 0–0.1)
IMM GRANULOCYTES NFR BLD: 0 %
LYMPHOCYTES # BLD AUTO: 1.9 X10E3/UL (ref 0.7–3.1)
LYMPHOCYTES NFR BLD AUTO: 32 %
MCH RBC QN AUTO: 26.3 PG (ref 26.6–33)
MCHC RBC AUTO-ENTMCNC: 31.3 G/DL (ref 31.5–35.7)
MCV RBC AUTO: 84 FL (ref 79–97)
MONOCYTES # BLD AUTO: 0.5 X10E3/UL (ref 0.1–0.9)
MONOCYTES NFR BLD AUTO: 9 %
NEUTROPHILS # BLD AUTO: 3.4 X10E3/UL (ref 1.4–7)
NEUTROPHILS NFR BLD AUTO: 58 %
PLATELET # BLD AUTO: 300 X10E3/UL (ref 150–379)
POTASSIUM SERPL-SCNC: 4.1 MMOL/L (ref 3.5–5.2)
PROT SERPL-MCNC: 8 G/DL (ref 6–8.5)
RBC # BLD AUTO: 4.57 X10E6/UL (ref 3.77–5.28)
SODIUM SERPL-SCNC: 140 MMOL/L (ref 134–144)
WBC # BLD AUTO: 6 X10E3/UL (ref 3.4–10.8)

## 2017-09-16 DIAGNOSIS — E11.21 TYPE 2 DIABETES WITH NEPHROPATHY (HCC): ICD-10-CM

## 2017-09-17 RX ORDER — SITAGLIPTIN AND METFORMIN HYDROCHLORIDE 100; 1000 MG/1; MG/1
TABLET, FILM COATED, EXTENDED RELEASE ORAL
Qty: 30 TAB | Refills: 4 | Status: SHIPPED | OUTPATIENT
Start: 2017-09-17 | End: 2018-03-17 | Stop reason: SDUPTHER

## 2017-09-17 RX ORDER — ASPIRIN AND DIPYRIDAMOLE 25; 200 MG/1; MG/1
CAPSULE, EXTENDED RELEASE ORAL
Qty: 60 CAP | Refills: 11 | Status: SHIPPED | OUTPATIENT
Start: 2017-09-17 | End: 2018-06-18 | Stop reason: SDUPTHER

## 2017-11-04 DIAGNOSIS — E11.21 TYPE 2 DIABETES WITH NEPHROPATHY (HCC): ICD-10-CM

## 2017-11-05 RX ORDER — GLIPIZIDE 10 MG/1
TABLET ORAL
Qty: 60 TAB | Refills: 4 | Status: SHIPPED | OUTPATIENT
Start: 2017-11-05 | End: 2018-04-21 | Stop reason: SDUPTHER

## 2017-11-07 NOTE — TELEPHONE ENCOUNTER
Called pt, and she has been advised she is due for a follow up appointment however states she does not have the money to come in right now.

## 2018-02-10 RX ORDER — BLOOD SUGAR DIAGNOSTIC
STRIP MISCELLANEOUS
Qty: 50 STRIP | Status: SHIPPED | OUTPATIENT
Start: 2018-02-10 | End: 2019-04-18 | Stop reason: SDUPTHER

## 2018-02-13 ENCOUNTER — OFFICE VISIT (OUTPATIENT)
Dept: FAMILY MEDICINE CLINIC | Age: 73
End: 2018-02-13

## 2018-02-13 VITALS
TEMPERATURE: 98.3 F | HEART RATE: 75 BPM | RESPIRATION RATE: 18 BRPM | HEIGHT: 64 IN | BODY MASS INDEX: 40.8 KG/M2 | WEIGHT: 239 LBS | DIASTOLIC BLOOD PRESSURE: 83 MMHG | SYSTOLIC BLOOD PRESSURE: 168 MMHG

## 2018-02-13 DIAGNOSIS — E11.21 TYPE 2 DIABETES WITH NEPHROPATHY (HCC): Primary | ICD-10-CM

## 2018-02-13 DIAGNOSIS — Z12.31 ENCOUNTER FOR SCREENING MAMMOGRAM FOR MALIGNANT NEOPLASM OF BREAST: ICD-10-CM

## 2018-02-13 DIAGNOSIS — Z23 ENCOUNTER FOR IMMUNIZATION: ICD-10-CM

## 2018-02-13 DIAGNOSIS — E78.00 ELEVATED CHOLESTEROL: ICD-10-CM

## 2018-02-13 DIAGNOSIS — I10 ESSENTIAL HYPERTENSION: ICD-10-CM

## 2018-02-13 DIAGNOSIS — F32.1 DEPRESSION, MAJOR, SINGLE EPISODE, MODERATE (HCC): ICD-10-CM

## 2018-02-13 DIAGNOSIS — E11.21 DIABETIC NEPHROPATHY ASSOCIATED WITH TYPE 2 DIABETES MELLITUS (HCC): ICD-10-CM

## 2018-02-13 NOTE — MR AVS SNAPSHOT
[address] 
[phone] Patient: Rafi Coleman MRN: [id number] :1945 Visit Information Date & Time Provider Department Dept. Phone Encounter #  
 2018  8:45 AM Rudy Thomas 34 [id number] Upcoming Health Maintenance Date Due DTaP/Tdap/Td series (1 - Tdap) 10/8/1968 ZOSTER VACCINE AGE 60> 2007 EYE EXAM RETINAL OR DILATED Q1 2017 LIPID PANEL Q1 2017 FOOT EXAM Q1 2017 HEMOGLOBIN A1C Q6M 2018 BREAST CANCER SCRN MAMMOGRAM 3/21/2018 Bone Densitometry 3/21/2018 MICROALBUMIN Q1 2018 GLAUCOMA SCREENING Q2Y 2018 MEDICARE YEARLY EXAM 2018 COLONOSCOPY 2026 Allergies as of 2018  Review Complete On: 2018 By: Yeimi Doss MD  
  
 Severity Noted Reaction Type Reactions Hydroxyzine  2016    Other (comments) Caused memory loss Levaquin [Levofloxacin]  2016    Diarrhea Lipitor [Atorvastatin]  2014   Side Effect Myalgia Lisinopril  2012   Side Effect Cough Penicillins  2011    Rash  
 Sulfa (Sulfonamide Antibiotics)  2011    Rash  
 Zoloft [Sertraline]  05/10/2016    Diarrhea Current Immunizations  Reviewed on 2016 Name Date Influenza Vaccine 10/29/2013 Influenza Vaccine (Quad) PF 2015 Influenza Vaccine Split 2012, 2011 10:00 AM  
 Pneumococcal Polysaccharide (PPSV-23) 2012 ZZZ-RETIRED (DO NOT USE) Pneumococcal Vaccine (Unspecified Type) 3/21/2012 Not reviewed this visit You Were Diagnosed With   
  
 Codes Comments Type 2 diabetes with nephropathy (HCC)    -  Primary ICD-10-CM: E11.21 
ICD-9-CM: 250.40, 583.81 Breast cancer screening     ICD-10-CM: Z12.31 
ICD-9-CM: V76.10 Essential hypertension     ICD-10-CM: I10 
ICD-9-CM: 401.9 Elevated cholesterol     ICD-10-CM: E78.00 ICD-9-CM: 272.0 Encounter for immunization     ICD-10-CM: D37 ICD-9-CM: V03.89 Encounter for screening mammogram for malignant neoplasm of breast     ICD-10-CM: Z12.31 
ICD-9-CM: V76.12 Diabetic nephropathy associated with type 2 diabetes mellitus ([facility]     ICD-10-CM: E11.21 
ICD-9-CM: 250.40, 583.81 Depression, major, single episode, moderate ([facility]     ICD-10-CM: F32.1 ICD-9-CM: 296.22 Vitals BP Pulse Temp Resp Height(growth percentile) Weight(growth percentile) 168/83 75 98.3 °F (36.8 °C) (Oral) 18 5' 4\" (1.626 m) 239 lb (108.4 kg) BMI OB Status Smoking Status 41.02 kg/m2 Hysterectomy Never Smoker Vitals History BMI and BSA Data Body Mass Index Body Surface Area 41.02 kg/m 2 2.21 m 2 Preferred Pharmacy Pharmacy Name Phone Sade Burt [address] [place] [address] [phone] Your Updated Medication List  
  
   
This list is accurate as of: 2/13/18  9:48 AM.  Always use your most recent med list. amLODIPine-valsartan  mg per tablet Commonly known as:  EXFORGE  
TAKE ONE TABLET BY MOUTH DAILY  
  
 aspirin-dipyridamole  mg per SR capsule Commonly known as:  AGGRENOX  
TAKE ONE CAPSULE BY MOUTH TWICE A DAY * BD ULTRA-FINE II LANCETS 30 gauge Misc Generic drug:  lancets TEST DAILY OR AS DIRECTED * One Touch Delica 33 gauge Misc Generic drug:  lancets TEST DAILY OR AS DIRECTED BY PHYSICIAN Blood Pressure Monitor Kit Commonly known as:  BLOOD PRESSURE KIT Use 2-3 times a week or as directed * Blood-Glucose Meter monitoring kit Commonly known as:  [place] Antionette Test daily or as directed. * Blood-Glucose Meter monitoring kit Commonly known as:  [place] Antionette Test blood sugars daily. Diphth, Pertus(Acell), Tetanus 2.5-8-5 Lf-mcg-Lf/0.5mL Susp susp Commonly known as:  BOOSTRIX TDAP  
0.5 mL by IntraMUSCular route once for 1 dose. glipiZIDE 10 mg tablet Commonly known as:  GLUCOTROL  
TAKE ONE TABLET BY MOUTH TWICE A DAY  
  
 hydroCHLOROthiazide 25 mg tablet Commonly known as:  HYDRODIURIL  
TAKE ONE TABLET BY MOUTH DAILY **GENERIC FOR: HYDRODIURIL  
  
 JANUMET -1,000 mg Tm24 Generic drug:  SITagliptin-metFORMIN  
TAKE ONE TABLET BY MOUTH DAILY BEFORE DINNER  
  
 metoprolol succinate 200 mg XL tablet Commonly known as:  TOPROL-XL  
TAKE ONE TABLET BY MOUTH DAILY MUCINEX 1,200 mg Ta12 ER tablet Generic drug:  guaiFENesin Take  by mouth two (2) times a day. ONETOUCH ULTRA TEST strip Generic drug:  glucose blood VI test strips USE TO TEST BLOOD SUGAR DAILY OR AS DIRECTED BY PHYSICIAN OXcarbazepine 300 mg tablet Commonly known as:  TRILEPTAL  
TAKE ONE AND ONE-HALF TABLET BY MOUTH TWICE A DAY  
  
 rosuvastatin 40 mg tablet Commonly known as:  CRESTOR  
TAKE ONE TABLET BY MOUTH NIGHTLY  
  
 solifenacin 5 mg tablet Commonly known as:  VESIcare Take 1 Tab by mouth daily. tiZANidine 2 mg capsule Commonly known as:  Anju Base Take 1 Cap by mouth three (3) times daily as needed. For pain  
  
 varicella-zoster recombinant (PF) 50 mcg/0.5 mL Susr injection Commonly known as:  SHINGRIX (PF)  
0.5 mL by IntraMUSCular route once for 1 dose. * Notice: This list has 4 medication(s) that are the same as other medications prescribed for you. Read the directions carefully, and ask your doctor or other care provider to review them with you. Prescriptions Sent to Pharmacy Refills Jacy Stratton,, Tetanus (BOOSTRIX TDAP) 2.5-8-5 Lf-mcg-Lf/0.5mL susp susp 0 Si.5 mL by IntraMUSCular route once for 1 dose. Class: Normal  
 Pharmacy: Jackie Rodriguez [address] [place] [address], [address] [place] [address], [address] #: [phone] Route: IntraMUSCular varicella-zoster recombinant, PF, (SHINGRIX, PF,) 50 mcg/0.5 mL susr injection 0 Si.5 mL by IntraMUSCular route once for 1 dose. Class: Normal  
 Pharmacy: Anya Foley [address], [address] Ph #: [phone] Route: IntraMUSCular We Performed the Following HEMOGLOBIN A1C WITH EAG [06363 CPT(R)] HEPATIC FUNCTION PANEL [73353 CPT(R)]  DIABETES FOOT EXAM [HM7 Custom] METABOLIC PANEL, BASIC [32234 CPT(R)] NMR LIPOPROFILE [id number] CPT(R)] To-Do List   
 2018 Imaging:  [facility] MAMMO BI SCREENING INCL CAD Introducing [facility]! Dear Christine Meneses: 
Thank you for requesting a General Assembly account. Our records indicate that you already have an active General Assembly account. You can access your account anytime at [email or web address] Did you know that you can access your hospital and ER discharge instructions at any time in General Assembly? You can also review all of your test results from your hospital stay or ER visit. Additional Information If you have questions, please visit the Frequently Asked Questions section of the General Assembly website at [email or web address] Remember, General Assembly is NOT to be used for urgent needs. For medical emergencies, dial 911. Now available from your iPhone and Android! Please provide this summary of care documentation to your next provider. Your primary care clinician is listed as Jessie Hardy. If you have any questions after today's visit, please call [phone].

## 2018-02-13 NOTE — PROGRESS NOTES
HISTORY OF PRESENT ILLNESS  Parnell Closs is a 81 y.o. female. Diabetes   The history is provided by the patient (her FBS have been mostly in the 140's. Also, she did not take her blood pressure medication this am.  Renal is following her HTN.). This is a chronic problem. The current episode started more than 1 week ago. The problem occurs constantly. Associated symptoms include headaches. Pertinent negatives include no chest pain, no abdominal pain and no shortness of breath. Associated symptoms comments: Her headaches have come back and she plans to call her neurologist. Nothing aggravates the symptoms. The symptoms are relieved by medications. Treatments tried: glipizide, Janumet. Review of Systems   Constitutional: Negative for weight loss. No weight gain   Eyes: Negative for blurred vision. Respiratory: Negative for shortness of breath. Cardiovascular: Negative for chest pain and leg swelling. Gastrointestinal: Negative for abdominal pain. Genitourinary: Negative for frequency. Neurological: Positive for headaches. Negative for dizziness, sensory change, speech change and focal weakness. Endo/Heme/Allergies: Negative for polydipsia. Psychiatric/Behavioral: Positive for depression. Negative for suicidal ideas. Lab Results   Component Value Date/Time    Hemoglobin A1c 7.4 (H) 08/08/2017 09:52 AM    Hemoglobin A1c (POC) 6.6 03/26/2014 10:42 AM         Visit Vitals    /83    Pulse 75    Temp 98.3 °F (36.8 °C) (Oral)    Resp 18    Ht 5' 4\" (1.626 m)    Wt 239 lb (108.4 kg)    BMI 41.02 kg/m2     BP Readings from Last 3 Encounters:   02/13/18 168/83   08/08/17 (!) 176/98   05/12/17 160/71     Physical Exam   Constitutional: She is oriented to person, place, and time. She appears well-developed and well-nourished. No distress. Cardiovascular: Normal rate, regular rhythm, normal heart sounds and intact distal pulses. Exam reveals no gallop and no friction rub.     No murmur heard. Pulmonary/Chest: Effort normal and breath sounds normal. No respiratory distress. She has no wheezes. She has no rales. Neurological: She is alert and oriented to person, place, and time. Normal monofilament exam   Skin: Skin is warm and dry. She is not diaphoretic. Feet and nails in good condition, onychomycosis. Nursing note and vitals reviewed. ASSESSMENT and PLAN    ICD-10-CM ICD-9-CM    1. Type 2 diabetes with nephropathy (HCC) E11.21 250.40 HEMOGLOBIN A1C WITH EAG     583.81  DIABETES FOOT EXAM      METABOLIC PANEL, BASIC   2. Diabetic nephropathy associated with type 2 diabetes mellitus (HCC) B42.51 757.75 METABOLIC PANEL, BASIC     583.81    3. Essential hypertension P79 417.1 METABOLIC PANEL, BASIC   4. Elevated cholesterol E78.00 272.0 NMR LIPOPROFILE      HEPATIC FUNCTION PANEL   5. Depression, major, single episode, moderate (HCC) F32.1 296.22    6. Encounter for immunization Z23 V03.89 Elana Arn,, Tetanus (BOOSTRIX TDAP) 2.5-8-5 Lf-mcg-Lf/0.5mL susp susp      varicella-zoster recombinant, PF, (SHINGRIX, PF,) 50 mcg/0.5 mL susr injection   7. Encounter for screening mammogram for malignant neoplasm of breast Z12.31 V76.12 [facility] MAMMO BI SCREENING INCL CAD        Uncertain diabetic control  Blood pressure elevated, did not take medications this am, renal also following  Needs lipids  Symptomatic depression, adamant that she not take meds  Labs per orders. TDAP and Shingrix at pharmacy  Mammogram     Follow-up Disposition:  Return in about 6 weeks (around 3/27/2018) for depression. Reviewed plan of care. Patient has provided input and agrees with goals.

## 2018-02-13 NOTE — PROGRESS NOTES
Chief Complaint   Patient presents with    Diabetes     f/u     1. Have you been to the ER, urgent care clinic since your last visit? No  Hospitalized since your last visit? No    2. Have you seen or consulted any other health care providers outside of the [address] [place] [address] since your last visit? Include any pap smears or colon screening.  No

## 2018-02-28 ENCOUNTER — HOSPITAL ENCOUNTER (OUTPATIENT)
Dept: MAMMOGRAPHY | Age: 73
Discharge: HOME | End: 2018-02-28
Attending: FAMILY MEDICINE
Payer: MEDICARE

## 2018-02-28 DIAGNOSIS — Z12.31 ENCOUNTER FOR SCREENING MAMMOGRAM FOR MALIGNANT NEOPLASM OF BREAST: ICD-10-CM

## 2018-02-28 PROCEDURE — 77067 SCR MAMMO BI INCL CAD: CPT

## 2018-03-12 ENCOUNTER — HOSPITAL ENCOUNTER (EMERGENCY)
Age: 73
Discharge: HOME | End: 2018-03-12
Attending: EMERGENCY MEDICINE
Payer: MEDICARE

## 2018-03-12 ENCOUNTER — APPOINTMENT (OUTPATIENT)
Dept: CT IMAGING | Age: 73
End: 2018-03-12
Attending: PHYSICIAN ASSISTANT
Payer: MEDICARE

## 2018-03-12 ENCOUNTER — APPOINTMENT (OUTPATIENT)
Dept: GENERAL RADIOLOGY | Age: 73
End: 2018-03-12
Attending: PHYSICIAN ASSISTANT
Payer: MEDICARE

## 2018-03-12 VITALS
DIASTOLIC BLOOD PRESSURE: 71 MMHG | HEIGHT: 64 IN | WEIGHT: 230 LBS | RESPIRATION RATE: 16 BRPM | OXYGEN SATURATION: 96 % | HEART RATE: 61 BPM | SYSTOLIC BLOOD PRESSURE: 161 MMHG | TEMPERATURE: 98.6 F | BODY MASS INDEX: 39.27 KG/M2
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
## 2018-03-12 DIAGNOSIS — S09.90XA INJURY OF HEAD, INITIAL ENCOUNTER: ICD-10-CM

## 2018-03-12 PROCEDURE — 70450 CT HEAD/BRAIN W/O DYE: CPT

## 2018-03-12 PROCEDURE — 72125 CT NECK SPINE W/O DYE: CPT

## 2018-03-12 PROCEDURE — 73502 X-RAY EXAM HIP UNI 2-3 VIEWS: CPT

## 2018-03-12 PROCEDURE — 99282 EMERGENCY DEPT VISIT SF MDM: CPT

## 2018-03-12 PROCEDURE — L0172 CERV COL SR FOAM 2PC PRE OTS: HCPCS

## 2018-03-13 ENCOUNTER — PATIENT OUTREACH (OUTPATIENT)
Dept: FAMILY MEDICINE CLINIC | Age: 73
End: 2018-03-13

## 2018-03-13 NOTE — PROGRESS NOTES
[facility]  ED  Discharge Follow-Up        Patient listed on discharge report on 3/12/18. Patient discharged from [facility] for Fall/Injury of head/right hip. Panel Manager contacted the patient by telephone to perform post ED discharge assessment. Verified  and address with patient as identifiers. Provided introduction to self, and explanation of the Panel Manger role. Medication: No new medications at discharge. Patient told from ED that she may take over tthe counter pain medication. Performed medication reconciliation with patient, and patient verbalizes understanding of administration of home medications. Discharge Instructions :  Reviewed discharge instructions with patient. Patient verbalizes understanding of discharge instructions and follow-up care. PCP/Specialist follow up: Patient scheduled to follow up with Dr. Marli Casillas on 3/21/18. Patient given an opportunity to ask questions. No other clinical/social/functional needs noted. The patient agrees to contact the PCP office for questions related to their healthcare. The patient expressed thanks, offered no additional questions and ended the call.

## 2018-03-13 NOTE — ED NOTES
I have reviewed discharge instructions with the patient and spouse. The patient and spouse verbalized understanding instructions and need for follow up with primary care provider. Pt is not in any current distress and shows no evidence of clinical instability. Pt left via wheelchair to private vehicle driven by spouse. Pt family/friends are present and pt left with all personal belongings. Pt states chief complaint has improved with treatment provided. Pt is alert and oriented to time, place, person and situation, pt hemodynamically/respiratorily stable. Paperwork given by provider and reviewed with patient and their spouse, opportunity for questions/clarification given.      Visit Vitals    /71    Pulse 61    Temp 98.6 °F (37 °C)    Resp 16    Ht 5' 4\" (1.626 m)    Wt 104.3 kg (230 lb)    SpO2 96%    BMI 39.48 kg/m2

## 2018-03-13 NOTE — ED PROVIDER NOTES
Patient is a 81 y.o. female presenting with fall. The history is provided by the patient. Fall   The accident occurred 1 to 2 hours ago. The fall occurred while walking. She fell from a height of ground level. She landed on concrete. The point of impact was the head and right hip. The pain is present in the head and right hip. The pain is at a severity of 8/10. The pain is moderate. She was ambulatory at the scene. There was no entrapment after the fall. There was no drug use involved in the accident. There was no alcohol use involved in the accident. Pertinent negatives include no visual change, no fever, no nausea, no hematuria, no loss of consciousness and no laceration. The risk factors include being elderly. She has tried nothing for the symptoms. The patient's last tetanus shot was less than 5 years ago.        Past Medical History:   Diagnosis Date    ACP (advance care planning) 2/9/2016    Patient plans to prepare an advanced directive and bring it in   [place] [address] 5/31/2016    Cataracts, bilateral 8/1/2011    Depression, major, single episode, moderate ([address] 8/1/2011    Sees Dr Austin Santoyo    Diabetic nephropathy ([address] 8/1/2011    Essential hypertension     Financial difficulties 7/30/2012    Hyperlipidemia     Obesity     SKYLER (obstructive sleep apnea) 2/9/2016    Pregnancy 8/1/2011    Stroke ([address]     2004 ARABELLA    TIA (transient ischemic attack) 8/1/2011       Past Surgical History:   Procedure Laterality Date    HX GYN      HX HYSTERECTOMY      over 25 years    HX MYOMECTOMY      oiver 27 years         Family History:   Problem Relation Age of Onset    Hypertension Mother     Seizures Mother     Hypertension Son     Diabetes Son     Elevated Lipids Son     Ataxia Daughter     Seizures Brother     Cancer Maternal Aunt        Social History     Social History    Marital status: SINGLE     Spouse name: N/A    Number of children: N/A    Years of education: N/A     Occupational History    Not on file. Social History Main Topics    Smoking status: Never Smoker    Smokeless tobacco: Never Used    Alcohol use 0.6 oz/week     1 Standard drinks or equivalent per week      Comment: occasionally    Drug use: No    Sexual activity: Not Currently     Other Topics Concern    Not on file     Social History Narrative         ALLERGIES: Hydroxyzine; Levaquin [levofloxacin]; Lipitor [atorvastatin]; Lisinopril; Penicillins; Sulfa (sulfonamide antibiotics); and Zoloft [sertraline]    Review of Systems   Constitutional: Negative. Negative for fever. HENT: Negative. Eyes: Negative. Respiratory: Negative. Cardiovascular: Negative. Gastrointestinal: Negative. Negative for nausea. Endocrine: Negative. Genitourinary: Negative. Negative for hematuria. Musculoskeletal: Positive for arthralgias and neck pain. Skin: Negative. Allergic/Immunologic: Negative. Neurological: Negative. Negative for loss of consciousness. Hematological: Negative. Psychiatric/Behavioral: Negative. All other systems reviewed and are negative. Vitals:    03/12/18 2115   BP: (!) 153/102   Pulse: 74   Resp: 16   Temp: 98.6 °F (37 °C)   SpO2: 95%   Weight: 104.3 kg (230 lb)   Height: 5' 4\" (1.626 m)            Physical Exam   Constitutional: She is oriented to person, place, and time. She appears well-developed. HENT:   Head: Normocephalic and atraumatic. Right Ear: External ear normal.   Left Ear: External ear normal.   Nose: Nose normal.   Mouth/Throat: Oropharynx is clear and moist. No oropharyngeal exudate. Eyes: Conjunctivae, EOM and lids are normal. Right eye exhibits no discharge. Left eye exhibits no discharge. Neck: Normal range of motion. No tracheal deviation present. No thyromegaly present. Cardiovascular: Normal rate, regular rhythm, normal heart sounds and intact distal pulses. Pulmonary/Chest: Effort normal and breath sounds normal.   Abdominal: Soft.  Normal appearance and bowel sounds are normal.   Musculoskeletal: Normal range of motion. Right hip: She exhibits tenderness. Spine palpated with out step off or crepitus. Non-TTP over spine. Mild ttp over paraspinal muscles in c spine region. Full ROM to all extremities. All extremities are NVI. Patient ambulatory to exam room with out difficulty. Neurological: She is alert and oriented to person, place, and time. Skin: Skin is warm and dry. No laceration noted. Psychiatric: She has a normal mood and affect. Judgment normal.        MDM  Number of Diagnoses or Management Options  Fall, initial encounter:   Injury of head, initial encounter:   Diagnosis management comments: Assesment/Plan- 81 y.o. Patient presents with:  Fall  differential includes: fall, hip fracture, head injury. imaging reviewed with no acute findings. Recommend PCP/ortho follow up. Patient educated on reasons to return to the ED.          Amount and/or Complexity of Data Reviewed  Tests in the radiology section of CPT®: ordered and reviewed          ED Course       Procedures

## 2018-03-13 NOTE — DISCHARGE INSTRUCTIONS
Learning About a Closed Head Injury  What is a closed head injury? A closed head injury happens when your head gets hit hard. The strong force of the blow causes your brain to shake in your skull. This movement can cause the brain to bruise, swell, or tear. Sometimes nerves or blood vessels also get damaged. This can cause bleeding in or around the brain. A concussion is a type of closed head injury. What are the symptoms? If you have a mild concussion, you may have a mild headache or feel \"not quite right. \" These symptoms are common. They usually go away over a few days to 4 weeks. But sometimes after a concussion, you feel like you can't function as well as before the injury. And you have new symptoms. This is called postconcussive syndrome. You may:  · Find it harder to solve problems, think, concentrate, or remember. · Have headaches. · Have changes in your sleep patterns, such as not being able to sleep or sleeping all the time. · Have changes in your personality. · Not be interested in your usual activities. · Feel angry or anxious without a clear reason. · Lose your sense of taste or smell. · Be dizzy, lightheaded, or unsteady. It may be hard to stand or walk. How is a closed head injury treated? Any person who may have a concussion needs to see a doctor. Some people have to stay in the hospital to be watched. Others can go home safely. If you go home, follow your doctor's instructions. He or she will tell you if you need someone to watch you closely for the next 24 hours or longer. Rest is the best treatment. Get plenty of sleep at night. And try to rest during the day. · Avoid activities that are physically or mentally demanding. These include housework, exercise, and schoolwork. And don't play video games, send text messages, or use the computer. You may need to change your school or work schedule to be able to avoid these activities.   · Ask your doctor when it's okay to drive, ride a bike, or operate machinery. · Take an over-the-counter pain medicine, such as acetaminophen (Tylenol), ibuprofen (Advil, Motrin), or naproxen (Aleve). Be safe with medicines. Read and follow all instructions on the label. · Check with your doctor before you use any other medicines for pain. · Do not drink alcohol or use illegal drugs. They can slow recovery. They can also increase your risk of getting a second head injury. Follow-up care is a key part of your treatment and safety. Be sure to make and go to all appointments, and call your doctor if you are having problems. It's also a good idea to know your test results and keep a list of the medicines you take. Where can you learn more? Go to http://yasmin-lokesh.info/. Enter E235 in the search box to learn more about \"Learning About a Closed Head Injury. \"  Current as of: October 14, 2016  Content Version: 11.4  © 8812-8527 [employer]. Care instructions adapted under license by [employer] (which disclaims liability or warranty for this information). If you have questions about a medical condition or this instruction, always ask your healthcare professional. [address] any warranty or liability for your use of this information. Preventing Falls: Care Instructions  Your Care Instructions    Getting around your home safely can be a challenge if you have injuries or health problems that make it easy for you to fall. Loose rugs and furniture in walkways are among the dangers for many older people who have problems walking or who have poor eyesight. People who have conditions such as arthritis, osteoporosis, or dementia also have to be careful not to fall. You can make your home safer with a few simple measures. Follow-up care is a key part of your treatment and safety. Be sure to make and go to all appointments, and call your doctor if you are having problems.  It's also a good idea to know your test results and keep a list of the medicines you take. How can you care for yourself at home? Taking care of yourself  · You may get dizzy if you do not drink enough water. To prevent dehydration, drink plenty of fluids, enough so that your urine is light yellow or clear like water. Choose water and other caffeine-free clear liquids. If you have kidney, heart, or liver disease and have to limit fluids, talk with your doctor before you increase the amount of fluids you drink. · Exercise regularly to improve your strength, muscle tone, and balance. Walk if you can. Swimming may be a good choice if you cannot walk easily. · Have your vision and hearing checked each year or any time you notice a change. If you have trouble seeing and hearing, you might not be able to avoid objects and could lose your balance. · Know the side effects of the medicines you take. Ask your doctor or pharmacist whether the medicines you take can affect your balance. Sleeping pills or sedatives can affect your balance. · Limit the amount of alcohol you drink. Alcohol can impair your balance and other senses. · Ask your doctor whether calluses or corns on your feet need to be removed. If you wear loose-fitting shoes because of calluses or corns, you can lose your balance and fall. · Talk to your doctor if you have numbness in your feet. Preventing falls at home  · Remove raised doorway thresholds, throw rugs, and clutter. Repair loose carpet or raised areas in the floor. · Move furniture and electrical cords to keep them out of walking paths. · Use nonskid floor wax, and wipe up spills right away, especially on ceramic tile floors. · If you use a walker or cane, put rubber tips on it. If you use crutches, clean the bottoms of them regularly with an abrasive pad, such as steel wool. · Keep your house well lit, especially [place], and outside walkways. Use night-lights in areas such as hallways and bathrooms. Add extra light switches or use remote switches (such as switches that go on or off when you clap your hands) to make it easier to turn lights on if you have to get up during the night. · Install sturdy handrails on stairways. · Move items in your cabinets so that the things you use a lot are on the lower shelves (about waist level). · Keep a cordless phone and a flashlight with new batteries by your bed. If possible, put a phone in each of the main rooms of your house, or carry a cell phone in case you fall and cannot reach a phone. Or, you can wear a device around your neck or wrist. You push a button that sends a signal for help. · Wear low-heeled shoes that fit well and give your feet good support. Use footwear with nonskid soles. Check the heels and soles of your shoes for wear. Repair or replace worn heels or soles. · Do not wear socks without shoes on wood floors. · Walk on the grass when the sidewalks are slippery. If you live in an area that gets snow and ice in the winter, sprinkle salt on slippery steps and sidewalks. Preventing falls in the bath  · Install grab bars and nonskid mats inside and outside your shower or tub and near the toilet and sinks. · Use shower chairs and bath benches. · Use a hand-held shower head that will allow you to sit while showering. · Get into a tub or shower by putting the weaker leg in first. Get out of a tub or shower with your strong side first.  · Repair loose toilet seats and consider installing a raised toilet seat to make getting on and off the toilet easier. · Keep your bathroom door unlocked while you are in the shower. Where can you learn more? Go to http://yasmin-lokesh.info/. Enter [id number] [id number] in the search box to learn more about \"Preventing Falls: Care Instructions. \"  Current as of: May 12, 2017  Content Version: 11.4  © 7069-6297 Editorially.  Care instructions adapted under license by [employer] (which disclaims liability or warranty for this information). If you have questions about a medical condition or this instruction, always ask your healthcare professional. [place] [address] any warranty or liability for your use of this information. We hope that we have addressed all of your medical concerns. The examination and treatment you received in the Emergency Department were for an emergent problem and were not intended as complete care. It is important that you follow up with your healthcare provider(s) for ongoing care. If your symptoms worsen or do not improve as expected, and you are unable to reach your usual health care provider(s), you should return to the Emergency Department. Today's healthcare is undergoing tremendous change, and patient satisfaction surveys are one of the many tools to assess the quality of medical care. You may receive a survey from the [employer] regarding your experience in the Emergency Department. I hope that your experience has been completely positive, particularly the medical care that I provided. As such, please participate in the survey; anything less than excellent does not meet my expectations or intentions. [facility] and [facility] participate in nationally recognized quality of care measures. If your blood pressure is greater than 120/80, as reported below, we urge that you seek medical care to address the potential of high blood pressure, commonly known as hypertension. Hypertension can be hereditary or can be caused by certain medical conditions, pain, stress, or \"white coat syndrome. \"       Please make an appointment with your health care provider(s) for follow up of your Emergency Department visit.        VITALS:   Patient Vitals for the past 8 hrs:   Temp Pulse Resp BP SpO2   03/12/18 2115 98.6 °F (37 °C) 74 16 (!) 153/102 95 %          Thank you for allowing us to provide you with medical care today. We realize that you have many choices for your emergency care needs. Please choose us in the future for any continued health care needs. Hank Anali Benito, [address]: [phone]            No results found for this or any previous visit (from the past 24 hour(s)). Xr Hip Rt W Or Wo Pelv 2-3 Vws    Result Date: 3/12/2018  EXAM:  XR HIP RT W OR WO PELV 2-3 VWS INDICATION:   fall. Hip pain COMPARISON: None. FINDINGS: An AP view of the pelvis and a frogleg lateral view of the right hip demonstrate no fracture, dislocation or other acute abnormality. There is moderate bilateral hip DJD. IMPRESSION:  No acute abnormality. Ct Head Wo Cont    Result Date: 3/12/2018  Indication:  Head injury Comparison: None Findings: 5 mm axial images were obtained from the skull base through the vertex. CT dose reduction was achieved through the use of a standardized protocol tailored for this examination and automatic exposure control for dose modulation. The ventricles and cortical sulci are prominent, compatible with age related volume loss. There is no evidence of intracranial hemorrhage, mass, mass effect, or acute infarct. There is periventricular white matter disease. No extra-axial fluid collections are seen. The visualized paranasal sinuses and mastoid air cells are clear. The orbital structures are unremarkable. No osseous abnormalities are seen. Impression: 1. No evidence of acute infarct or intracranial hemorrhage. 2. Moderate periventricular white matter disease is likely secondary to chronic small vessel ischemic changes. Ct Spine Cerv Wo Cont    Result Date: 3/12/2018  INDICATION:   fall with neck injury COMPARISON: None. TECHNIQUE:   Noncontrast axial CT imaging of the cervical spine was performed. Coronal and sagittal reconstructions were obtained.  CT dose reduction was achieved through the use of a standardized protocol tailored for this examination and automatic exposure control for dose modulation. FINDINGS: There is no evidence of acute osseous abnormality. There is no acute alignment abnormality. Vertebral body heights are maintained. There is no appreciable prevertebral soft tissue swelling or epidural hematoma. There is multilevel degenerative spondylosis. There is multilevel bilateral neuroforaminal stenosis. Evaluation of the paraspinal soft tissues demonstrates no significant pathology. The visualized lung apices are clear. IMPRESSION: 1. No acute osseous abnormality. 2. Multilevel degenerative spondylosis.

## 2018-03-17 DIAGNOSIS — E11.21 TYPE 2 DIABETES WITH NEPHROPATHY (HCC): ICD-10-CM

## 2018-03-18 RX ORDER — SITAGLIPTIN AND METFORMIN HYDROCHLORIDE 100; 1000 MG/1; MG/1
TABLET, FILM COATED, EXTENDED RELEASE ORAL
Qty: 30 TAB | Refills: 5 | Status: SHIPPED | OUTPATIENT
Start: 2018-03-18 | End: 2018-12-13 | Stop reason: SDUPTHER

## 2018-03-22 RX ORDER — AMLODIPINE AND VALSARTAN 10; 320 MG/1; MG/1
TABLET ORAL
Qty: 90 TAB | Refills: 0 | Status: CANCELLED | OUTPATIENT
Start: 2018-03-22

## 2018-03-23 ENCOUNTER — OFFICE VISIT (OUTPATIENT)
Dept: FAMILY MEDICINE CLINIC | Age: 73
End: 2018-03-23

## 2018-03-23 VITALS
SYSTOLIC BLOOD PRESSURE: 153 MMHG | DIASTOLIC BLOOD PRESSURE: 84 MMHG | HEART RATE: 55 BPM | HEIGHT: 64 IN | TEMPERATURE: 98 F | BODY MASS INDEX: 40.46 KG/M2 | RESPIRATION RATE: 20 BRPM | WEIGHT: 237 LBS

## 2018-03-23 DIAGNOSIS — S00.03XA CONTUSION OF SCALP, INITIAL ENCOUNTER: ICD-10-CM

## 2018-03-23 DIAGNOSIS — R10.12 LEFT UPPER QUADRANT PAIN: ICD-10-CM

## 2018-03-23 DIAGNOSIS — E78.00 ELEVATED CHOLESTEROL: ICD-10-CM

## 2018-03-23 DIAGNOSIS — E11.21 TYPE 2 DIABETES WITH NEPHROPATHY (HCC): ICD-10-CM

## 2018-03-23 DIAGNOSIS — I10 ESSENTIAL HYPERTENSION: Primary | ICD-10-CM

## 2018-03-23 DIAGNOSIS — E11.21 DIABETIC NEPHROPATHY ASSOCIATED WITH TYPE 2 DIABETES MELLITUS (HCC): ICD-10-CM

## 2018-03-23 RX ORDER — AMLODIPINE AND VALSARTAN 10; 320 MG/1; MG/1
TABLET ORAL
Qty: 90 TAB | Refills: 0 | Status: SHIPPED | OUTPATIENT
Start: 2018-03-23 | End: 2018-06-20 | Stop reason: SDUPTHER

## 2018-03-23 RX ORDER — OMEPRAZOLE 20 MG/1
20 CAPSULE, DELAYED RELEASE ORAL DAILY
COMMUNITY
End: 2019-02-12

## 2018-03-23 NOTE — PROGRESS NOTES
Chief Complaint   Patient presents with    Hypertension     follow up     Fall     last week and hit her head      Health Maintenance   Topic Date Due    DTaP/Tdap/Td series (1 - Tdap) 10/08/1968    ZOSTER VACCINE AGE 60>  08/08/2007    EYE EXAM RETINAL OR DILATED Q1  04/25/2017    LIPID PANEL Q1  11/09/2017    HEMOGLOBIN A1C Q6M  02/08/2018    Bone Densitometry  03/21/2018    MICROALBUMIN Q1  04/06/2018    GLAUCOMA SCREENING Q2Y  04/25/2018    MEDICARE YEARLY EXAM  08/09/2018    FOOT EXAM Q1  02/13/2019    BREAST CANCER SCRN MAMMOGRAM  02/28/2020    COLONOSCOPY  06/06/2026    Hepatitis C Screening  Completed    Bone Densitometry (Dexa) Screening  Completed    Pneumococcal 65+ Low/Medium Risk  Completed    Influenza Age 5 to Adult  Addressed

## 2018-03-23 NOTE — MR AVS SNAPSHOT
[address] 
[phone] Patient: Franchesca Mckeon MRN: [id number] :1945 Visit Information Date & Time Provider Department Dept. Phone Encounter #  
 3/23/2018  8:15 AM William Lloydjeff 34 [id number] Follow-up Instructions Return in about 1 month (around 2018) for blood pressure check. Your Appointments 2018 10:45 AM  
ROUTINE CARE with Halle Major MD  
[address] [facility] Appt Note: follow up depression, HTN; r/s follow up depression, HTN  
 [address]  
[phone]  
  
   
 [address] [place] [address] [address] [address] Upcoming Health Maintenance Date Due DTaP/Tdap/Td series (1 - Tdap) 10/8/1968 ZOSTER VACCINE AGE 60> 2007 EYE EXAM RETINAL OR DILATED Q1 2017 LIPID PANEL Q1 2017 HEMOGLOBIN A1C Q6M 2018 Bone Densitometry 3/21/2018 MICROALBUMIN Q1 2018 GLAUCOMA SCREENING Q2Y 2018 MEDICARE YEARLY EXAM 2018 FOOT EXAM Q1 2019 BREAST CANCER SCRN MAMMOGRAM 2020 COLONOSCOPY 2026 Allergies as of 3/23/2018  Review Complete On: 3/23/2018 By: Halle Major MD  
  
 Severity Noted Reaction Type Reactions Hydroxyzine  2016    Other (comments) Caused memory loss Levaquin [Levofloxacin]  2016    Diarrhea Lipitor [Atorvastatin]  2014   Side Effect Myalgia Lisinopril  2012   Side Effect Cough Penicillins  2011    Rash  
 Sulfa (Sulfonamide Antibiotics)  2011    Rash  
 Zoloft [Sertraline]  05/10/2016    Diarrhea Current Immunizations  Reviewed on 2016 Name Date Influenza Vaccine 10/29/2013 Influenza Vaccine (Quad) PF 2015  Influenza Vaccine Split 2012, 2011 10:00 AM  
 Pneumococcal Polysaccharide (PPSV-23) 12/4/2012 ZZZ-RETIRED (DO NOT USE) Pneumococcal Vaccine (Unspecified Type) 3/21/2012 Not reviewed this visit You Were Diagnosed With   
  
 Codes Comments Essential hypertension    -  Primary ICD-10-CM: I10 
ICD-9-CM: 401.9 Contusion of scalp, initial encounter     ICD-10-CM: S00. [address] ICD-9-CM: 474 Left upper quadrant pain     ICD-10-CM: R10.12 ICD-9-CM: 789.02 Type 2 diabetes with nephropathy (HCC)     ICD-10-CM: E11.21 
ICD-9-CM: 250.40, 583.81 Elevated cholesterol     ICD-10-CM: E78.00 ICD-9-CM: 272.0 Diabetic nephropathy associated with type 2 diabetes mellitus ([facility] [address]     ICD-10-CM: E11.21 
ICD-9-CM: 250.40, 583.81 Vitals BP Pulse Temp Resp Height(growth percentile) Weight(growth percentile) 153/84 (!) 55 98 °F (36.7 °C) (Oral) 20 5' 4\" (1.626 m) 237 lb (107.5 kg) BMI OB Status Smoking Status 40.68 kg/m2 Hysterectomy Never Smoker Vitals History BMI and BSA Data Body Mass Index Body Surface Area  
 40.68 kg/m 2 2.2 m 2 Preferred Pharmacy Pharmacy Name Phone [facility] [address] [place] [address] [phone] Your Updated Medication List  
  
   
This list is accurate as of 3/23/18  9:16 AM.  Always use your most recent med list. amLODIPine-valsartan  mg per tablet Commonly known as:  EXFORGE  
TAKE ONE TABLET BY MOUTH DAILY  
  
 aspirin-dipyridamole  mg per SR capsule Commonly known as:  AGGRENOX  
TAKE ONE CAPSULE BY MOUTH TWICE A DAY * BD ULTRA-FINE II LANCETS 30 gauge Misc Generic drug:  lancets TEST DAILY OR AS DIRECTED * One Touch Delica 33 gauge Misc Generic drug:  lancets TEST DAILY OR AS DIRECTED BY PHYSICIAN Blood Pressure Monitor Kit Commonly known as:  BLOOD PRESSURE KIT Use 2-3 times a week or as directed * Blood-Glucose Meter monitoring kit Commonly known as:  [place] Antionette Test daily or as directed. * Blood-Glucose Meter monitoring kit Commonly known as:  [place] Antionette Test blood sugars daily. glipiZIDE 10 mg tablet Commonly known as:  GLUCOTROL  
TAKE ONE TABLET BY MOUTH TWICE A DAY  
  
 hydroCHLOROthiazide 25 mg tablet Commonly known as:  HYDRODIURIL  
TAKE ONE TABLET BY MOUTH DAILY **GENERIC FOR: HYDRODIURIL  
  
 JANUMET -1,000 mg Tm24 Generic drug:  SITagliptin-metFORMIN  
TAKE ONE TABLET BY MOUTH DAILY BEFORE DINNER  
  
 metoprolol succinate 200 mg XL tablet Commonly known as:  TOPROL-XL  
TAKE ONE TABLET BY MOUTH DAILY MUCINEX 1,200 mg Ta12 ER tablet Generic drug:  guaiFENesin Take  by mouth two (2) times a day. ONETOUCH ULTRA TEST strip Generic drug:  glucose blood VI test strips USE TO TEST BLOOD SUGAR DAILY OR AS DIRECTED BY PHYSICIAN OXcarbazepine 300 mg tablet Commonly known as:  TRILEPTAL  
TAKE ONE AND ONE-HALF TABLET BY MOUTH TWICE A DAY PriLOSEC 20 mg capsule Generic drug:  omeprazole Take 1 Cap by mouth daily. rosuvastatin 40 mg tablet Commonly known as:  CRESTOR  
TAKE ONE TABLET BY MOUTH NIGHTLY  
  
 solifenacin 5 mg tablet Commonly known as:  VESIcare Take 1 Tab by mouth daily. tiZANidine 2 mg capsule Commonly known as:  Dorothyann Marine Take 1 Cap by mouth three (3) times daily as needed. For pain * Notice: This list has 4 medication(s) that are the same as other medications prescribed for you. Read the directions carefully, and ask your doctor or other care provider to review them with you. Prescriptions Sent to Pharmacy Refills  
 amLODIPine-valsartan (EXFORGE)  mg per tablet 0 Sig: TAKE ONE TABLET BY MOUTH DAILY Class: Normal  
 Pharmacy: [facility] [address], [address] #: [phone] We Performed the Following CBC WITH AUTOMATED DIFF [32245 CPT(R)] HEMOGLOBIN A1C WITH EAG [17819 CPT(R)] LIPASE [id number] CPT(R)] METABOLIC PANEL, COMPREHENSIVE [65366 CPT(R)] NMR LIPOPROFILE [id number] CPT(R)] REFERRAL TO GASTROENTEROLOGY [FOZ26 Custom] Comments: LUQ pain Follow-up Instructions Return in about 1 month (around 4/23/2018) for blood pressure check. Referral Information Referral ID Referred By Referred To  
  
 [id number] [facility] [address] [place], [place] [address] [address] Phone: [phone] Fax: [phone] Visits Status Start Date End Date 1 New Request 3/23/18 3/23/19 If your referral has a status of pending review or denied, additional information will be sent to support the outcome of this decision. Introducing [facility]! Dear Aurea Osorio: 
Thank you for requesting a [employer] account. Our records indicate that you already have an active [employer] account. You can access your account anytime at [email or web address] Did you know that you can access your hospital and ER discharge instructions at any time in [employer]? You can also review all of your test results from your hospital stay or ER visit. Additional Information If you have questions, please visit the Frequently Asked Questions section of the [employer] website at [email or web address] Remember, [employer] is NOT to be used for urgent needs. For medical emergencies, dial 911. Now available from your iPhone and Android! Please provide this summary of care documentation to your next provider. Your primary care clinician is listed as Rosanna Mccurdy. If you have any questions after today's visit, please call [phone].

## 2018-03-25 LAB
ALBUMIN SERPL-MCNC: 4.4 G/DL (ref 3.5–4.8)
ALBUMIN/GLOB SERPL: 1.3 {RATIO} (ref 1.2–2.2)
ALP SERPL-CCNC: 93 IU/L (ref 39–117)
ALT SERPL-CCNC: 13 IU/L (ref 0–32)
AST SERPL-CCNC: 12 IU/L (ref 0–40)
BASOPHILS # BLD AUTO: 0 X10E3/UL (ref 0–0.2)
BASOPHILS NFR BLD AUTO: 0 %
BILIRUB SERPL-MCNC: <0.2 MG/DL (ref 0–1.2)
BUN SERPL-MCNC: 12 MG/DL (ref 8–27)
BUN/CREAT SERPL: 15 (ref 12–28)
CALCIUM SERPL-MCNC: 9.8 MG/DL (ref 8.7–10.3)
CHLORIDE SERPL-SCNC: 95 MMOL/L (ref 96–106)
CHOLEST SERPL-MCNC: 370 MG/DL (ref 100–199)
CO2 SERPL-SCNC: 28 MMOL/L (ref 18–29)
CREAT SERPL-MCNC: 0.79 MG/DL (ref 0.57–1)
EOSINOPHIL # BLD AUTO: 0.1 X10E3/UL (ref 0–0.4)
EOSINOPHIL NFR BLD AUTO: 1 %
ERYTHROCYTE [DISTWIDTH] IN BLOOD BY AUTOMATED COUNT: 16.3 % (ref 12.3–15.4)
EST. AVERAGE GLUCOSE BLD GHB EST-MCNC: 163 MG/DL
GFR SERPLBLD CREATININE-BSD FMLA CKD-EPI: 76 ML/MIN/1.73
GFR SERPLBLD CREATININE-BSD FMLA CKD-EPI: 88 ML/MIN/1.73
GLOBULIN SER CALC-MCNC: 3.3 G/DL (ref 1.5–4.5)
GLUCOSE SERPL-MCNC: 131 MG/DL (ref 65–99)
HBA1C MFR BLD: 7.3 % (ref 4.8–5.6)
HCT VFR BLD AUTO: 37.8 % (ref 34–46.6)
HDL SERPL-SCNC: 33.2 UMOL/L
HDLC SERPL-MCNC: 47 MG/DL
HGB BLD-MCNC: 12.1 G/DL (ref 11.1–15.9)
IMM GRANULOCYTES # BLD: 0 X10E3/UL (ref 0–0.1)
IMM GRANULOCYTES NFR BLD: 0 %
INTERPRETATION, 910389: NORMAL
LDL SERPL QN: 20.7 NM
LDL SERPL-SCNC: >3500 NMOL/L
LDL SMALL SERPL-SCNC: >2300 NMOL/L
LDLC SERPL CALC-MCNC: 282 MG/DL (ref 0–99)
LIPASE SERPL-CCNC: 19 U/L (ref 14–72)
LP-IR SCORE SERPL: 82
LYMPHOCYTES # BLD AUTO: 1.6 X10E3/UL (ref 0.7–3.1)
LYMPHOCYTES NFR BLD AUTO: 33 %
Lab: NORMAL
MCH RBC QN AUTO: 26.8 PG (ref 26.6–33)
MCHC RBC AUTO-ENTMCNC: 32 G/DL (ref 31.5–35.7)
MCV RBC AUTO: 84 FL (ref 79–97)
MONOCYTES # BLD AUTO: 0.4 X10E3/UL (ref 0.1–0.9)
MONOCYTES NFR BLD AUTO: 8 %
NEUTROPHILS # BLD AUTO: 2.8 X10E3/UL (ref 1.4–7)
NEUTROPHILS NFR BLD AUTO: 58 %
PLATELET # BLD AUTO: 336 X10E3/UL (ref 150–379)
POTASSIUM SERPL-SCNC: 4.1 MMOL/L (ref 3.5–5.2)
PROT SERPL-MCNC: 7.7 G/DL (ref 6–8.5)
RBC # BLD AUTO: 4.51 X10E6/UL (ref 3.77–5.28)
SODIUM SERPL-SCNC: 140 MMOL/L (ref 134–144)
TRIGL SERPL-MCNC: 204 MG/DL (ref 0–149)
WBC # BLD AUTO: 5 X10E3/UL (ref 3.4–10.8)

## 2018-03-27 ENCOUNTER — PATIENT OUTREACH (OUTPATIENT)
Dept: FAMILY MEDICINE CLINIC | Age: 73
End: 2018-03-27

## 2018-03-27 NOTE — PROGRESS NOTES
NN Follow-Up          Follow up call placed to patient. Patient discharged from [facility] on 3/12/18 for Fall/Injury of head. Panel Manager contacted the patient by telephone to perform post ED discharge follow up. No answer, message left requesting retun call. Patient has followed up with PCP.

## 2018-04-08 ENCOUNTER — TELEPHONE (OUTPATIENT)
Dept: FAMILY MEDICINE CLINIC | Age: 73
End: 2018-04-08

## 2018-04-08 NOTE — TELEPHONE ENCOUNTER
Please call patient and let her know her bad cholesterol is extremely high, an concerning. I would like for her to come in to discuss [address] [place] [address]

## 2018-04-09 RX ORDER — LANCETS 33 GAUGE
EACH MISCELLANEOUS
Qty: 100 LANCET | Status: SHIPPED | OUTPATIENT
Start: 2018-04-09 | End: 2019-11-21 | Stop reason: SDUPTHER

## 2018-04-09 NOTE — TELEPHONE ENCOUNTER
Called and spoke with pt, and she has been advised and states understanding of results. Pt will keep her 04/25/2018 appointment to discuss.

## 2018-04-11 ENCOUNTER — PATIENT OUTREACH (OUTPATIENT)
Dept: FAMILY MEDICINE CLINIC | Age: 73
End: 2018-04-11

## 2018-04-11 NOTE — PROGRESS NOTES
Panel Manager will resolve  ANAT. Patient has been contacted and has followed up with PCP. No sign that patient has return to ED/Hospital in the last 30 days.

## 2018-04-21 DIAGNOSIS — E11.21 TYPE 2 DIABETES WITH NEPHROPATHY (HCC): ICD-10-CM

## 2018-04-23 RX ORDER — GLIPIZIDE 10 MG/1
TABLET ORAL
Qty: 60 TAB | Refills: 5 | Status: SHIPPED | OUTPATIENT
Start: 2018-04-23 | End: 2019-02-04 | Stop reason: SDUPTHER

## 2018-04-25 ENCOUNTER — OFFICE VISIT (OUTPATIENT)
Dept: FAMILY MEDICINE CLINIC | Age: 73
End: 2018-04-25

## 2018-04-25 VITALS
WEIGHT: 237 LBS | HEART RATE: 66 BPM | TEMPERATURE: 98.4 F | RESPIRATION RATE: 18 BRPM | BODY MASS INDEX: 40.46 KG/M2 | HEIGHT: 64 IN | SYSTOLIC BLOOD PRESSURE: 154 MMHG | DIASTOLIC BLOOD PRESSURE: 75 MMHG

## 2018-04-25 DIAGNOSIS — F32.1 DEPRESSION, MAJOR, SINGLE EPISODE, MODERATE (HCC): ICD-10-CM

## 2018-04-25 DIAGNOSIS — Z13.820 OSTEOPOROSIS SCREENING: ICD-10-CM

## 2018-04-25 DIAGNOSIS — E78.00 ELEVATED CHOLESTEROL: ICD-10-CM

## 2018-04-25 DIAGNOSIS — E11.21 TYPE 2 DIABETES WITH NEPHROPATHY (HCC): ICD-10-CM

## 2018-04-25 DIAGNOSIS — G45.2 MULTIPLE AND BILATERAL PRECEREBRAL ARTERY SYNDROMES: ICD-10-CM

## 2018-04-25 DIAGNOSIS — I10 ESSENTIAL HYPERTENSION: Primary | ICD-10-CM

## 2018-04-25 RX ORDER — SPIRONOLACTONE 25 MG/1
TABLET ORAL
COMMUNITY
Start: 2018-04-04 | End: 2018-07-25

## 2018-04-25 NOTE — PROGRESS NOTES
HISTORY OF PRESENT ILLNESS  Kelsie Mccray is a 81 y.o. female. HPI Comments: Kelsie Mccray is here for follow up on her HTN and recent lipid testing. She has been compliant with her medications. Evidently, she sees Nephrology and her blood pressure is always good there. Her LDL-P was over 3500. She is taking Crestor, but does not always take it because it makes her hurt. She has a history of a CVA. Also, she had been feeling depressed and lonely. She has been better the past several weeks. Denies feeling depressed and she enjoys life. Hypertension   The history is provided by the patient. This is a chronic problem. The current episode started more than 1 week ago. The problem occurs constantly. The problem has not changed since onset. Pertinent negatives include no chest pain, no abdominal pain, no headaches and no shortness of breath. Nothing aggravates the symptoms. The symptoms are relieved by medications. Treatments tried: spironolactone, Exforge, Toprol XL, HCTZ. The treatment provided moderate relief. Review of Systems   Constitutional: Negative for weight loss. No weight gain   Eyes: Positive for blurred vision. She just saw the eye doctor and has a new glasses prescription. Respiratory: Negative for shortness of breath. Cardiovascular: Negative for chest pain and leg swelling. Gastrointestinal: Negative for abdominal pain. Neurological: Negative for dizziness, sensory change, speech change, focal weakness and headaches. Psychiatric/Behavioral: Positive for depression. Visit Vitals    /75    Pulse 66    Temp 98.4 °F (36.9 °C) (Oral)    Resp 18    Ht 5' 4\" (1.626 m)    Wt 237 lb (107.5 kg)    BMI 40.68 kg/m2     BP Readings from Last 3 Encounters:   04/25/18 154/75   03/23/18 153/84   03/12/18 161/71     Physical Exam   Constitutional: She is oriented to person, place, and time. She appears well-developed and well-nourished. No distress. Cardiovascular: Normal rate, regular rhythm and normal heart sounds. Exam reveals no gallop and no friction rub. No murmur heard. Pulmonary/Chest: Effort normal and breath sounds normal. No respiratory distress. She has no wheezes. She has no rales. Musculoskeletal: She exhibits no edema. Neurological: She is alert and oriented to person, place, and time. Skin: Skin is warm and dry. She is not diaphoretic. Nursing note and vitals reviewed. ASSESSMENT and PLAN    ICD-10-CM ICD-9-CM    1. Essential hypertension I10 401.9 AMB SUPPLY ORDER   2. Depression, major, single episode, moderate (HCC) F32.1 296.22    3. Type 2 diabetes with nephropathy (HCC) E11.21 250.40 MICROALBUMIN, UR, RAND W/ MICROALB/CREAT RATIO     583.81    4. Elevated cholesterol E78.00 272.0 evolocumab (REPATHA SURECLICK) pen injection   5. Osteoporosis screening Z13.820 V82.81 DEXA BONE DENSITY STUDY AXIAL        Blood pressure elevated  Depression doing well  Severely elevated LDL in a diabetic with a history of a TIA, statin intolerant  Check home blood pressures and bring them in  Will start the process of getting Repatha approved  Bone density    Follow-up Disposition:  Return in about 3 months (around 7/25/2018) for blood pressures - bring home blood pressures. Reviewed plan of care. Patient has provided input and agrees with goals.

## 2018-04-25 NOTE — PROGRESS NOTES
Chief Complaint   Patient presents with    Depression     f/u and hypertension/cholesterol lab results     1. Have you been to the ER, urgent care clinic since your last visit? No Hospitalized since your last visit? No     2. Have you seen or consulted any other health care providers outside of the [address] [place] [address] since your last visit? Include any pap smears or colon screening.  No

## 2018-04-25 NOTE — MR AVS SNAPSHOT
[address] 
[phone] Patient: Erna Whiting MRN: [id number] :1945 Visit Information Date & Time Provider Department Dept. Phone Encounter #  
 2018 10:45 AM Linnette Antonio MD [address] [place] [id number] Follow-up Instructions Return in about 3 months (around 2018) for blood pressures - bring home blood pressures. Upcoming Health Maintenance Date Due DTaP/Tdap/Td series (1 - Tdap) 10/8/1968 ZOSTER VACCINE AGE 60> 2007 EYE EXAM RETINAL OR DILATED Q1 2017 Bone Densitometry 3/21/2018 MICROALBUMIN Q1 2018 GLAUCOMA SCREENING Q2Y 2018 MEDICARE YEARLY EXAM 2018 HEMOGLOBIN A1C Q6M 2018 FOOT EXAM Q1 2019 LIPID PANEL Q1 3/23/2019 BREAST CANCER SCRN MAMMOGRAM 2020 COLONOSCOPY 2026 Allergies as of 2018  Review Complete On: 2018 By: Linnette Antonio MD  
  
 Severity Noted Reaction Type Reactions Hydroxyzine  2016    Other (comments) Caused memory loss Levaquin [Levofloxacin]  2016    Diarrhea Lipitor [Atorvastatin]  2014   Side Effect Myalgia Lisinopril  2012   Side Effect Cough Penicillins  2011    Rash  
 Sulfa (Sulfonamide Antibiotics)  2011    Rash  
 Zoloft [Sertraline]  05/10/2016    Diarrhea Current Immunizations  Reviewed on 2016 Name Date Influenza Vaccine 10/29/2013 Influenza Vaccine (Quad) PF 2015 Influenza Vaccine Split 2012, 2011 10:00 AM  
 Pneumococcal Polysaccharide (PPSV-23) 2012 ZZZ-RETIRED (DO NOT USE) Pneumococcal Vaccine (Unspecified Type) 3/21/2012 Not reviewed this visit You Were Diagnosed With   
  
 Codes Comments  Type 2 diabetes with nephropathy (HCC)    -  Primary ICD-10-CM: E11.21 
ICD-9-CM: 250.40, 583.81   
 Osteoporosis screening     ICD-10-CM: Z13.820 ICD-9-CM: V82.81 Depression, major, single episode, moderate ([facility]     ICD-10-CM: F32.1 ICD-9-CM: 296.22 Elevated cholesterol     ICD-10-CM: E78.00 ICD-9-CM: 272.0 Essential hypertension     ICD-10-CM: I10 
ICD-9-CM: 401.9 Vitals BP Pulse Temp Resp Height(growth percentile) Weight(growth percentile) 154/75 66 98.4 °F (36.9 °C) (Oral) 18 5' 4\" (1.626 m) 237 lb (107.5 kg) BMI OB Status Smoking Status 40.68 kg/m2 Hysterectomy Never Smoker Vitals History BMI and BSA Data Body Mass Index Body Surface Area  
 40.68 kg/m 2 2.2 m 2 Preferred Pharmacy Pharmacy Name Phone [facility] [facility], [address] [place] [address] [phone] Your Updated Medication List  
  
   
This list is accurate as of 4/25/18 12:23 PM.  Always use your most recent med list. amLODIPine-valsartan  mg per tablet Commonly known as:  EXFORGE  
TAKE ONE TABLET BY MOUTH DAILY  
  
 aspirin-dipyridamole  mg per SR capsule Commonly known as:  AGGRENOX  
TAKE ONE CAPSULE BY MOUTH TWICE A DAY * BD ULTRA-FINE II LANCETS 30 gauge Misc Generic drug:  lancets TEST DAILY OR AS DIRECTED * One Touch Delica 33 gauge Misc Generic drug:  lancets TEST DAILY AS DIRECTED BY PHYSICIAN. Blood Pressure Monitor Kit Commonly known as:  BLOOD PRESSURE KIT Use 2-3 times a week or as directed * Blood-Glucose Meter monitoring kit Commonly known as:  [email or web address] Test daily or as directed. * Blood-Glucose Meter monitoring kit Commonly known as:  [email or web address] Test blood sugars daily. glipiZIDE 10 mg tablet Commonly known as:  GLUCOTROL  
TAKE ONE TABLET BY MOUTH TWICE A DAY  
  
 hydroCHLOROthiazide 25 mg tablet Commonly known as:  HYDRODIURIL  
TAKE ONE TABLET BY MOUTH DAILY **GENERIC FOR: HYDRODIURIL  
  
 JANUMET -1,000 mg Tm24 Generic drug:  SITagliptin-metFORMIN  
TAKE ONE TABLET BY MOUTH DAILY BEFORE DINNER  
  
 metoprolol succinate 200 mg XL tablet Commonly known as:  TOPROL-XL  
TAKE ONE TABLET BY MOUTH DAILY  
  
 ONETOUCH ULTRA TEST strip Generic drug:  glucose blood VI test strips USE TO TEST BLOOD SUGAR DAILY OR AS DIRECTED BY PHYSICIAN OXcarbazepine 300 mg tablet Commonly known as:  TRILEPTAL  
TAKE ONE AND ONE-HALF TABLET BY MOUTH TWO TIMES A DAY PriLOSEC 20 mg capsule Generic drug:  omeprazole Take 1 Cap by mouth daily. solifenacin 5 mg tablet Commonly known as:  VESIcare Take 1 Tab by mouth daily. spironolactone 25 mg tablet Commonly known as:  ALDACTONE  
  
 * Notice: This list has 4 medication(s) that are the same as other medications prescribed for you. Read the directions carefully, and ask your doctor or other care provider to review them with you. We Performed the Following AMB SUPPLY ORDER [[id number] Custom] Comments:  
 Blood pressure monitor with large cuff; diagnosis - HTN  
 MICROALBUMIN, UR, RAND W/ MICROALB/CREAT RATIO [90233 CPT(R)] Follow-up Instructions Return in about 3 months (around 7/25/2018) for blood pressures - bring home blood pressures. To-Do List   
 04/25/2018 Imaging:  DEXA BONE DENSITY STUDY AXIAL [facility]! Dear Claudine Israel: 
Thank you for requesting a [employer] account. Our records indicate that you already have an active [employer] account. You can access your account anytime at [email or web address] Did you know that you can access your hospital and ER discharge instructions at any time in [employer]? You can also review all of your test results from your hospital stay or ER visit. Additional Information If you have questions, please visit the Frequently Asked Questions section of the [employer] website at [email or web address] Remember, [employer] is NOT to be used for urgent needs. For medical emergencies, dial 911. Now available from your iPhone and Android! Please provide this summary of care documentation to your next provider. Your primary care clinician is listed as Marianela Olivier. If you have any questions after today's visit, please call [phone].

## 2018-04-26 LAB
ALBUMIN/CREAT UR: 119.6 MG/G CREAT (ref 0–30)
CREAT UR-MCNC: 94.7 MG/DL
MICROALBUMIN UR-MCNC: 113.3 UG/ML

## 2018-04-29 ENCOUNTER — TELEPHONE (OUTPATIENT)
Dept: FAMILY MEDICINE CLINIC | Age: 73
End: 2018-04-29

## 2018-04-29 NOTE — TELEPHONE ENCOUNTER
I have printed a prescription for her to start [address]. Please contact the pharmaceutical rep to get her approved. I have her card on my desk if you do not have the information.

## 2018-05-02 NOTE — TELEPHONE ENCOUNTER
Left message for Dusty Ivey to please call our office back. Needs to discuss trying to get pt started on Repatha.

## 2018-05-09 ENCOUNTER — HOSPITAL ENCOUNTER (OUTPATIENT)
Dept: MAMMOGRAPHY | Age: 73
Discharge: HOME | End: 2018-05-09
Attending: FAMILY MEDICINE
Payer: MEDICARE

## 2018-05-09 DIAGNOSIS — Z13.820 OSTEOPOROSIS SCREENING: ICD-10-CM

## 2018-05-09 PROCEDURE — 77080 DXA BONE DENSITY AXIAL: CPT

## 2018-05-14 ENCOUNTER — DOCUMENTATION ONLY (OUTPATIENT)
Dept: FAMILY MEDICINE CLINIC | Age: 73
End: 2018-05-14

## 2018-05-14 NOTE — PROGRESS NOTES
Opened patient's chart to research reason why she stopped taking Crestor, because this information is needed to complete form to get insurance approval of Repatha, per order of Dr. Lisa García.

## 2018-05-15 ENCOUNTER — DOCUMENTATION ONLY (OUTPATIENT)
Dept: FAMILY MEDICINE CLINIC | Age: 73
End: 2018-05-15

## 2018-05-16 ENCOUNTER — DOCUMENTATION ONLY (OUTPATIENT)
Dept: FAMILY MEDICINE CLINIC | Age: 73
End: 2018-05-16

## 2018-05-16 NOTE — PROGRESS NOTES
Opened patient's chart to print Allergies list, because she is allergist to Lipitor and this information is needed to fax with completed form for insurance approval of [facility].

## 2018-05-21 ENCOUNTER — TELEPHONE (OUTPATIENT)
Dept: FAMILY MEDICINE CLINIC | Age: 73
End: 2018-05-21

## 2018-05-21 NOTE — TELEPHONE ENCOUNTER
Notification from [address]:    Prior authorization approved for Relpatha effective 5/18/18 - 8/19/18 and [facility] will also fax copy of approval to Dr. Ender Winter.  Agustín

## 2018-05-23 DIAGNOSIS — G50.0 SUPRAORBITAL NEURALGIA: ICD-10-CM

## 2018-05-23 DIAGNOSIS — M54.81 OCCIPITAL NEURALGIA OF RIGHT SIDE: ICD-10-CM

## 2018-05-23 RX ORDER — OXCARBAZEPINE 300 MG/1
TABLET, FILM COATED ORAL
Qty: 90 TAB | Refills: 0 | OUTPATIENT
Start: 2018-05-23

## 2018-05-25 ENCOUNTER — TELEPHONE (OUTPATIENT)
Dept: FAMILY MEDICINE CLINIC | Age: 73
End: 2018-05-25

## 2018-05-25 NOTE — TELEPHONE ENCOUNTER
----- Message from Harjit Alford sent at 5/25/2018 11:21 AM EDT -----  Regarding: /Telephone  [facility] from [address] [place] [address] [employer] is calling to advise that there is approval for [place] for this pt, and if the pt needed assistance with copay have the pt call for the programs    Best contact for [address] [place] [address] [phone]

## 2018-05-30 NOTE — TELEPHONE ENCOUNTER
Patient informed & she will get in touch with Dusty to inquire about getting assistance with co-pay.

## 2018-05-30 NOTE — TELEPHONE ENCOUNTER
Patient stated, she called Sarah Castillo and was told she needs to go on the website to download four forms that needs to be completed by her & signed by her PCP, then faxed back. It could take up to 20 days before she receive answer whether she will be receiving help with co-pay. Patient called her insurance company & was told to have her PCP fax a written Sarah Castillo prescription to [facility] @ [phone], and their company will supply the [employer].

## 2018-06-05 NOTE — TELEPHONE ENCOUNTER
Yamilka Keys  stopped by to advise nurse regarding pt's specialty pharmacy being able to mail pt's forms for copay assistance which would allow pt to pay $33 per month, about $100 every 3 months. Nurses can call Chris Rodrigez at [phone] if additional help is needed.  Agustín

## 2018-06-18 ENCOUNTER — OFFICE VISIT (OUTPATIENT)
Dept: NEUROLOGY | Age: 73
End: 2018-06-18

## 2018-06-18 VITALS
SYSTOLIC BLOOD PRESSURE: 140 MMHG | OXYGEN SATURATION: 95 % | HEIGHT: 64 IN | WEIGHT: 233 LBS | RESPIRATION RATE: 20 BRPM | BODY MASS INDEX: 39.78 KG/M2 | DIASTOLIC BLOOD PRESSURE: 84 MMHG | HEART RATE: 70 BPM

## 2018-06-18 DIAGNOSIS — R09.89 BILATERAL CAROTID BRUITS: ICD-10-CM

## 2018-06-18 DIAGNOSIS — G50.0 SUPRAORBITAL NEURALGIA: Primary | ICD-10-CM

## 2018-06-18 DIAGNOSIS — G45.2 MULTIPLE AND BILATERAL PRECEREBRAL ARTERY SYNDROMES: ICD-10-CM

## 2018-06-18 DIAGNOSIS — E11.21 TYPE 2 DIABETES WITH NEPHROPATHY (HCC): ICD-10-CM

## 2018-06-18 DIAGNOSIS — E78.00 ELEVATED CHOLESTEROL: ICD-10-CM

## 2018-06-18 DIAGNOSIS — M54.81 OCCIPITAL NEURALGIA OF RIGHT SIDE: ICD-10-CM

## 2018-06-18 DIAGNOSIS — R09.89 BILATERAL CAROTID BRUITS: Primary | ICD-10-CM

## 2018-06-18 DIAGNOSIS — I10 ESSENTIAL HYPERTENSION: ICD-10-CM

## 2018-06-18 PROBLEM — E66.01 SEVERE OBESITY (BMI 35.0-39.9): Status: ACTIVE | Noted: 2018-06-18

## 2018-06-18 RX ORDER — OXCARBAZEPINE 300 MG/1
TABLET, FILM COATED ORAL
Qty: 90 TAB | Refills: 11 | Status: SHIPPED | OUTPATIENT
Start: 2018-06-18 | End: 2019-07-22 | Stop reason: SDUPTHER

## 2018-06-18 RX ORDER — ASPIRIN AND DIPYRIDAMOLE 25; 200 MG/1; MG/1
CAPSULE, EXTENDED RELEASE ORAL
Qty: 60 CAP | Refills: 11 | Status: SHIPPED | OUTPATIENT
Start: 2018-06-18 | End: 2019-07-06 | Stop reason: SDUPTHER

## 2018-06-18 NOTE — PATIENT INSTRUCTIONS

## 2018-06-18 NOTE — PROGRESS NOTES
LOV January of 2017- has been up and down, good and bad days   When the pain starts it is not good at all, had like 20 pills left, 1.5 twice a day was the dosage she was supposed to be taking however when she didn't call to make an appt she was trying to make it last   She has reduced it to take just 1 a day so she knows it isn't in her system well enough to help control the pain

## 2018-06-18 NOTE — PROGRESS NOTES
Date:  18     Name:  Kareem Noble  :  1945  MRN:  [id number]     PCP:  Mary Norton MD    Chief Complaint   Patient presents with    Follow-up     supraorbital neuralgia      HISTORY OF PRESENT ILLNESS: Follow up for left supraorbital and right occipital neuralgia and s/p CVA. She continues with Aggrenox for secondary stroke prevention. She does follow up with her PCP for ongoing management of her other stroke risk factors which include hypertension, dyslipidemia, and diabetes. Of note, she is not taking Crestor at this point. She indicates that the Crestor was discontinued due myalgias which also occurred when she was on Lipitor. She was recently started on Repatha and she is waiting to get this started. She does continue taking Trileptal for management of the occipital and supraorbital neurologist.  This does seem to help as long as she can take 1-1/2 tablets per day. Due to being overdue for follow-up appointment and running out of her medications, she did reduce this down to just one Trileptal per day which has not been that beneficial. Since her last office, she has had two falls. Once was     Except as noted above, denies  fever, chills, cough. No CP or SOB. No dysuria, loss of bowel or bladder control. No Weight loss. Appetite good. Sleeping well. No sweats. No edema. No bruising or bleeding. No nausea or vomit. No diarrhea. No frequency, urgency, No depressive sxs. No anxiety. Denies sore throat, nasal congestion, nasal discharge, epistaxis, tinnitus, hearing loss, back pain, muscle pain, or joint pain. Current Outpatient Prescriptions   Medication Sig    evolocumab (REPATHA SURECLICK) pen injection 3 mL by SubCUTAneous route every month.     spironolactone (ALDACTONE) 25 mg tablet     glipiZIDE (GLUCOTROL) 10 mg tablet TAKE ONE TABLET BY MOUTH TWICE A DAY    OXcarbazepine (TRILEPTAL) 300 mg tablet TAKE ONE AND ONE-HALF TABLET BY MOUTH TWO TIMES A DAY    ONE TOUCH DELICA 33 gauge misc TEST DAILY AS DIRECTED BY PHYSICIAN.  omeprazole (PRILOSEC) 20 mg capsule Take 1 Cap by mouth daily.  amLODIPine-valsartan (EXFORGE)  mg per tablet TAKE ONE TABLET BY MOUTH DAILY    JANUMET -1,000 mg TM24 TAKE ONE TABLET BY MOUTH DAILY BEFORE DINNER    ONETOUCH ULTRA TEST strip USE TO TEST BLOOD SUGAR DAILY OR AS DIRECTED BY PHYSICIAN    hydroCHLOROthiazide (HYDRODIURIL) 25 mg tablet TAKE ONE TABLET BY MOUTH DAILY **GENERIC FOR: HYDRODIURIL    aspirin-dipyridamole (AGGRENOX)  mg per SR capsule TAKE ONE CAPSULE BY MOUTH TWICE A DAY    solifenacin (VESICARE) 5 mg tablet Take 1 Tab by mouth daily.  Blood-Glucose Meter (ONETOUCH ULTRAMINI) monitoring kit Test blood sugars daily.  metoprolol succinate (TOPROL-XL) 200 mg XL tablet TAKE ONE TABLET BY MOUTH DAILY    Blood Pressure Monitor (BLOOD PRESSURE KIT) kit Use 2-3 times a week or as directed    Blood-Glucose Meter (ONETOUCH ULTRAMINI) monitoring kit Test daily or as directed.  BD ULTRA-FINE II LANCETS 30 gauge misc TEST DAILY OR AS DIRECTED     No current facility-administered medications for this visit.       Allergies   Allergen Reactions    Hydroxyzine Other (comments)     Caused memory loss    Levaquin [Levofloxacin] Diarrhea    Lipitor [Atorvastatin] Myalgia    Lisinopril Cough    Penicillins Rash    Sulfa (Sulfonamide Antibiotics) Rash    Zoloft [Sertraline] Diarrhea     Past Medical History:   Diagnosis Date    ACP (advance care planning) 2/9/2016    Patient plans to prepare an advanced directive and bring it in   [place] [id number] 5/31/2016    Cataracts, bilateral 8/1/2011    Depression, major, single episode, moderate ([address] 8/1/2011    Sees Dr Cinthia Blizzard    Diabetic nephropathy ([facility] 8/1/2011    Essential hypertension     Financial difficulties 7/30/2012    Hyperlipidemia     Obesity     SKYLER (obstructive sleep apnea) 2/9/2016    Pregnancy 8/1/2011    Stroke ([facility]     2004 ARABELLA    TIA (transient ischemic attack) 8/1/2011     Past Surgical History:   Procedure Laterality Date    HX GYN      HX HYSTERECTOMY      over 25 years    HX MYOMECTOMY      oiver 29 years     Social History     Social History    Marital status: SINGLE     Spouse name: N/A    Number of children: N/A    Years of education: N/A     Occupational History    Not on file. Social History Main Topics    Smoking status: Never Smoker    Smokeless tobacco: Never Used    Alcohol use 0.6 oz/week     1 Standard drinks or equivalent per week      Comment: occasionally    Drug use: No    Sexual activity: Not Currently     Other Topics Concern    Not on file     Social History Narrative     Family History   Problem Relation Age of Onset    Hypertension Mother     Seizures Mother     Hypertension Son     Diabetes Son     Elevated Lipids Son     Ataxia Daughter     Seizures Brother     Cancer Maternal Aunt        PHYSICAL EXAMINATION:    Visit Vitals    Resp 20    Ht 5' 4\" (1.626 m)    Wt 105.7 kg (233 lb)    BMI 39.99 kg/m2     General: Well defined, nourished, and groomed individual in no acute distress. Neck: Supple, nontender, no bruits, no pain with resistance to active range of motion. Significant neck shoulder and back spasm. There is some tenderness between the shoulders   Heart: Regular rate and rhythm, no murmurs, rub, or gallop. Normal S1S2. Lungs: Clear to auscultation bilaterally with equal chest expansion, no cough, no wheeze   Musculoskeletal: Extremities revealed no edema and had full range of motion of joints. Psych: Good mood and bright affect   NEUROLOGICAL EXAMINATION:   Mental Status: Alert and oriented to person, place, and time with recent and remote memory intact. Attention span and concentration are normal. Speech is fluent with a full fund of knowledge. Cranial Nerves:   II, III, IV, VI: Visual acuity grossly intact.  Visual fields are normal.   Pupils are equal, round, and reactive to light and accommodation. Extra-ocular movements are full and fluid. Fundoscopic exam was benign, no ptosis or nystagmus. V-XII: Hearing is grossly intact. Facial features are symmetric, with normal sensation and strength. The palate rises symmetrically and the tongue protrudes midline. Sternocleidomastoids 5/5. Motor Examination: Normal tone, bulk, and strength, 5/5 muscle strength throughout. Coordination: No resting or intention tremor   Gait and Station: Steady while walking. No pronator drift. No muscle wasting or fasiculations noted. Reflexes: DTRs 2+ throughout. Mild left supraorbital and mild right occipital tenderness with palpation. ASSESSMENT AND PLAN    ICD-10-CM ICD-9-CM    1. Supraorbital neuralgia G52.9 352.9 OXcarbazepine (TRILEPTAL) 300 mg tablet   2. Occipital neuralgia of right side M54.81 723.8 OXcarbazepine (TRILEPTAL) 300 mg tablet   3. Multiple and bilateral precerebral artery syndromes G45.2 433.30 aspirin-dipyridamole (AGGRENOX)  mg per SR capsule   4. Bilateral carotid bruits R09.89 785.9 DUPLEX CAROTID BILATERAL AMB NEURO   5. Essential hypertension I10 401.9    6. Type 2 diabetes with nephropathy (HCC) E11.21 250.40      583.81    7. Elevated cholesterol E78.00 272.0      Reinforced secondary stroke prevention today. This included signs and symptoms of stroke, BE-FAST, and when to call for EMS. Stressed importance of recognition and early intervention. LDL goal less than 70 per secondary stroke guidelines. Given intolerance to Statin therapy, she is to start [address]. Continue with aggrenox for secondary stroke prevention as she is a Plavix non-responder. Discussed lifestyle modification and importance of exercise and appropriate diet, weight management, and management of comorbid disease with appropriate follow up visits with primary care and other healthcare providers.   Given her diabetes as well as her bilateral carotid bruits and uncontrolled dyslipidemia, she will have a repeat carotid Doppler today. She continues to have a left supraorbital neuralgia and a right occipital neuralgia. Of note, she did have some tenderness with the right supraorbital notch as well today. This is likely secondary to only taking the Trileptal at 1 tablet daily as opposed to her original dose of 1-1/2 tablets twice a day. As long as she is taking the Trileptal, this is typically not an issue. Refills for this was provided today. Follow-up in 1 year or sooner if needed. Bernie Renner

## 2018-06-18 NOTE — MR AVS SNAPSHOT
[address] 
 
 
 [place] [address] [place] [address] [phone] Patient: Merrick Funk MRN: [id number] :1945 Visit Information Date & Time Provider Department Dept. Phone Encounter #  
 2018  9:00 AM Subhash Nettles NP [facility] Neurology [facility] [phone] [id number] Your Appointments 2018 11:00 AM  
ULTRASOUND with DOPPLER  [facility] ([address]) Appt Note: doppler cr  
 [place]  [address] [place] [address] [phone]  
  
   
 [address] [facility] [address]  
  
    
 2018  9:30 AM  
ROUTINE CARE with Larry Armstrong MD  
[address] [address] [facility]) Appt Note: 3 month follow up bp, bring home blood pressures [address]  
[phone]  
  
   
 [address] [place] [address] [address] [address] Upcoming Health Maintenance Date Due DTaP/Tdap/Td series (1 - Tdap) 10/8/1968 ZOSTER VACCINE AGE 60> 2007 EYE EXAM RETINAL OR DILATED Q1 2017 GLAUCOMA SCREENING Q2Y 2018 Influenza Age 5 to Adult 2018 HEMOGLOBIN A1C Q6M 2018 FOOT EXAM Q1 2019 LIPID PANEL Q1 3/23/2019 MICROALBUMIN Q1 2019 BREAST CANCER SCRN MAMMOGRAM 2020 Bone Densitometry 2020 COLONOSCOPY 2026 Allergies as of 2018  Review Complete On: 2018 By: Subhash Nettles NP Severity Noted Reaction Type Reactions Hydroxyzine  2016    Other (comments) Caused memory loss Levaquin [Levofloxacin]  2016    Diarrhea Lipitor [Atorvastatin]  2014   Side Effect Myalgia Lisinopril  2012   Side Effect Cough Penicillins  2011    Rash  
 Sulfa (Sulfonamide Antibiotics)  2011    Rash  
 Zoloft [Sertraline]  05/10/2016    Diarrhea Current Immunizations  Reviewed on 12/6/2016 Name Date Influenza Vaccine 10/29/2013 Influenza Vaccine (Quad) PF 12/18/2015 Influenza Vaccine Split 11/16/2012, 9/21/2011 10:00 AM  
 Pneumococcal Polysaccharide (PPSV-23) 12/4/2012 ZZZ-RETIRED (DO NOT USE) Pneumococcal Vaccine (Unspecified Type) 3/21/2012 Not reviewed this visit You Were Diagnosed With   
  
 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 
ICD-9-CM: 401.9 Type 2 diabetes with nephropathy (HCC)     ICD-10-CM: E11.21 
ICD-9-CM: 250.40, 583.81 Elevated cholesterol     ICD-10-CM: E78.00 ICD-9-CM: 272.0 Vitals BP Pulse Resp Height(growth percentile) Weight(growth percentile) SpO2  
 140/84 70 20 5' 4\" (1.626 m) 233 lb (105.7 kg) 95% BMI OB Status Smoking Status 39.99 kg/m2 Hysterectomy Never Smoker Vitals History BMI and BSA Data Body Mass Index Body Surface Area  
 39.99 kg/m 2 2.18 m 2 Preferred Pharmacy Pharmacy Name Phone Fiorella Rivera [address] [address], [facility] [address] [phone] Your Updated Medication List  
  
   
This list is accurate as of 6/18/18 10:21 AM.  Always use your most recent med list. amLODIPine-valsartan  mg per tablet Commonly known as:  EXFORGE  
TAKE ONE TABLET BY MOUTH DAILY  
  
 aspirin-dipyridamole  mg per SR capsule Commonly known as:  AGGRENOX  
TAKE ONE CAPSULE BY MOUTH TWICE A DAY * BD ULTRA-FINE II LANCETS 30 gauge Misc Generic drug:  lancets TEST DAILY OR AS DIRECTED * One Touch Delica 33 gauge Misc Generic drug:  lancets TEST DAILY AS DIRECTED BY PHYSICIAN. Blood Pressure Monitor Kit Commonly known as:  BLOOD PRESSURE KIT Use 2-3 times a week or as directed Blood-Glucose Meter monitoring kit Commonly known as:  Namita Oliveira Test daily or as directed. evolocumab pen injection Commonly known as:  REPATHA SURECLICK  
3 mL by SubCUTAneous route every month. glipiZIDE 10 mg tablet Commonly known as:  GLUCOTROL  
TAKE ONE TABLET BY MOUTH TWICE A DAY  
  
 hydroCHLOROthiazide 25 mg tablet Commonly known as:  HYDRODIURIL  
TAKE ONE TABLET BY MOUTH DAILY **GENERIC FOR: HYDRODIURIL  
  
 JANUMET -1,000 mg Tm24 Generic drug:  SITagliptin-metFORMIN  
TAKE ONE TABLET BY MOUTH DAILY BEFORE DINNER  
  
 metoprolol succinate 200 mg XL tablet Commonly known as:  TOPROL-XL  
TAKE ONE TABLET BY MOUTH DAILY  
  
 ONETOUCH ULTRA TEST strip Generic drug:  glucose blood VI test strips USE TO TEST BLOOD SUGAR DAILY OR AS DIRECTED BY PHYSICIAN OXcarbazepine 300 mg tablet Commonly known as:  TRILEPTAL  
TAKE ONE AND ONE-HALF TABLET BY MOUTH TWO TIMES A DAY PriLOSEC 20 mg capsule Generic drug:  omeprazole Take 1 Cap by mouth daily. solifenacin 5 mg tablet Commonly known as:  VESIcare Take 1 Tab by mouth daily. spironolactone 25 mg tablet Commonly known as:  ALDACTONE  
  
 * Notice: This list has 2 medication(s) that are the same as other medications prescribed for you. Read the directions carefully, and ask your doctor or other care provider to review them with you. Prescriptions Sent to Pharmacy Refills OXcarbazepine (TRILEPTAL) 300 mg tablet 11 Sig: TAKE ONE AND ONE-HALF TABLET BY MOUTH TWO TIMES A DAY Class: Normal  
 Pharmacy: [address] [place] [address] Ph #: [phone]  
 aspirin-dipyridamole (AGGRENOX)  mg per SR capsule 11 Sig: TAKE ONE CAPSULE BY MOUTH TWICE A DAY  Class: Normal  
 Pharmacy: [facility] [address] [place] [address].  #: [phone] To-Do List   
 06/18/2018 Imaging:  DUPLEX CAROTID BILATERAL AMB NEURO Patient Instructions A Healthy Lifestyle: Care Instructions Your Care Instructions A healthy lifestyle can help you feel good, stay at a healthy weight, and have plenty of energy for both work and play. A healthy lifestyle is something you can share with your whole family. A healthy lifestyle also can lower your risk for serious health problems, such as high blood pressure, heart disease, and diabetes. You can follow a few steps listed below to improve your health and the health of your family. Follow-up care is a key part of your treatment and safety. Be sure to make and go to all appointments, and call your doctor if you are having problems. It's also a good idea to know your test results and keep a list of the medicines you take. How can you care for yourself at home? · Do not eat too much sugar, fat, or fast foods. You can still have dessert and treats now and then. The goal is moderation. · Start small to improve your eating habits. Pay attention to portion sizes, drink less juice and soda pop, and eat more fruits and vegetables. ¨ Eat a healthy amount of food. A 3-ounce serving of meat, for example, is about the size of a deck of cards. Fill the rest of your plate with vegetables and whole grains. ¨ Limit the amount of soda and sports drinks you have every day. Drink more water when you are thirsty. ¨ Eat at least 5 servings of fruits and vegetables every day. It may seem like a lot, but it is not hard to reach this goal. A serving or helping is 1 piece of fruit, 1 cup of vegetables, or 2 cups of leafy, raw vegetables. Have an apple or some carrot sticks as an afternoon snack instead of a candy bar.  Try to have fruits and/or vegetables at every meal. 
 · Make exercise part of your daily routine. You may want to start with simple activities, such as walking, bicycling, or slow swimming. Try to be active 30 to 60 minutes every day. You do not need to do all 30 to 60 minutes all at once. For example, you can exercise 3 times a day for 10 or 20 minutes. Moderate exercise is safe for most people, but it is always a good idea to talk to your doctor before starting an exercise program. 
· Keep moving. Vi Pour the lawn, work in the garden, or Melophone. Take the stairs instead of the elevator at work. · If you smoke, quit. People who smoke have an increased risk for heart attack, stroke, cancer, and other lung illnesses. Quitting is hard, but there are ways to boost your chance of quitting tobacco for good. ¨ Use nicotine gum, patches, or lozenges. ¨ Ask your doctor about stop-smoking programs and medicines. ¨ Keep trying. In addition to reducing your risk of diseases in the future, you will notice some benefits soon after you stop using tobacco. If you have shortness of breath or asthma symptoms, they will likely get better within a few weeks after you quit. · Limit how much alcohol you drink. Moderate amounts of alcohol (up to 2 drinks a day for men, 1 drink a day for women) are okay. But drinking too much can lead to liver problems, high blood pressure, and other health problems. Family health If you have a family, there are many things you can do together to improve your health. · Eat meals together as a family as often as possible. · Eat healthy foods. This includes fruits, vegetables, lean meats and dairy, and whole grains. · Include your family in your fitness plan. Most people think of activities such as jogging or tennis as the way to fitness, but there are many ways you and your family can be more active. Anything that makes you breathe hard and gets your heart pumping is exercise. Here are some tips: ¨ Walk to do errands or to take your child to school or the bus. ¨ Go for a family bike ride after dinner instead of watching TV. Where can you learn more? Go to http://yasmin-lokesh.info/. Enter J344 in the search box to learn more about \"A Healthy Lifestyle: Care Instructions. \" Current as of: May 12, 2017 Content Version: 11.4 © 9790-8159 [employer]. Care instructions adapted under license by [employer] (which disclaims liability or warranty for this information). If you have questions about a medical condition or this instruction, always ask your healthcare professional. [address] any warranty or liability for your use of this information. Introducing [facility] & HEALTH SERVICES! Dear Julio Coronado: 
Thank you for requesting a Speek account. Our records indicate that you already have an active Speek account. You can access your account anytime at [email or web address] Did you know that you can access your hospital and ER discharge instructions at any time in Speek? You can also review all of your test results from your hospital stay or ER visit. Additional Information If you have questions, please visit the Frequently Asked Questions section of the Speek website at [email or web address] Remember, Speek is NOT to be used for urgent needs. For medical emergencies, dial 911. Now available from your iPhone and Android! Please provide this summary of care documentation to your next provider. Your primary care clinician is listed as Darshan Yu. If you have any questions after today's visit, please call [phone].

## 2018-06-20 DIAGNOSIS — I10 ESSENTIAL HYPERTENSION: ICD-10-CM

## 2018-06-24 RX ORDER — AMLODIPINE AND VALSARTAN 10; 320 MG/1; MG/1
TABLET ORAL
Qty: 90 TAB | Refills: 3 | Status: SHIPPED | OUTPATIENT
Start: 2018-06-24 | End: 2018-10-03

## 2018-06-25 NOTE — PROCEDURES
Carotid Doppler:     Date:  6/18/2018     Requesting Physician:  Michael Naranjo NP     Indication:  Bilateral bruits, evaluate for stenosis     B-mode imaging reveals mixed plaque at the bifurcations bilaterally extending into the proximal internal carotid artery segments bilaterally. Doppler spectral analysis reveals no significant velocity shifts. Vertebral artery flow antegrade bilaterally. Interpretation:    1. Plaque as noted. 2. Stenosis estimated at 16-49% bilateral ICA.       ·

## 2018-07-08 ENCOUNTER — TELEPHONE (OUTPATIENT)
Dept: FAMILY MEDICINE CLINIC | Age: 73
End: 2018-07-08

## 2018-07-08 PROBLEM — I65.23 ASYMPTOMATIC BILATERAL CAROTID ARTERY STENOSIS: Status: ACTIVE | Noted: 2018-07-08

## 2018-07-09 DIAGNOSIS — E78.00 ELEVATED CHOLESTEROL: ICD-10-CM

## 2018-07-09 NOTE — TELEPHONE ENCOUNTER
----- Message from Allison Jin sent at 7/9/2018  9:04 AM EDT -----  Regarding: Dr. Naz Horta [phone]    Pt stated, she spoke with [facility] [phone] this morning and was advised to contact her doctor as no Rx had been received for \"Repatha\". Pt would like a call back advising whether the Rx sent the [employer]. Pt stated, she is currently not taking any Rx for her Chol.

## 2018-07-09 NOTE — TELEPHONE ENCOUNTER
Please call patient and let her know she has mild blockages of both carotid arteries. She needs to keep her cholesterol and blood pressure well controlled.

## 2018-07-10 NOTE — TELEPHONE ENCOUNTER
The prescription was put in, but I am not sure where it went. There are only certain pharmacies that stock it. Please check into this - I know the drug rep mentioned some of them.

## 2018-07-10 NOTE — TELEPHONE ENCOUNTER
Called pt, and left a voice message, asking that she call the office back. Pt's Stephanie Ashford was sent to [facility], pt may want it sent to mail order.

## 2018-07-10 NOTE — TELEPHONE ENCOUNTER
----- Message from Yanira Powers sent at 7/10/2018 12:07 PM EDT -----  Regarding: Dr. Wilder Chowdary / Hawk Bravo: [phone]  Pt returning call and requested a call back

## 2018-07-12 NOTE — TELEPHONE ENCOUNTER
Pt called stating she never received prescription for Daphane Beau, was notified it was approved through august and was notified by [facility] pharmacy they don't have it, but remembers being advised by nurse it would need to be sent to [facility]'s specialty pharmacy. Prescription printed by Dr. Vicente Ren in April. Please call pt to advise.  Agustín

## 2018-07-13 NOTE — TELEPHONE ENCOUNTER
Please re print pt's Angella Aleman. RX will need to be faxed to [address] [place] [address])     Fax # [phone].

## 2018-07-19 RX ORDER — HYDROCHLOROTHIAZIDE 25 MG/1
TABLET ORAL
Qty: 90 TAB | Refills: 3 | Status: SHIPPED | OUTPATIENT
Start: 2018-07-19 | End: 2018-10-03 | Stop reason: SDUPTHER

## 2018-07-25 ENCOUNTER — OFFICE VISIT (OUTPATIENT)
Dept: FAMILY MEDICINE CLINIC | Age: 73
End: 2018-07-25

## 2018-07-25 VITALS
TEMPERATURE: 98.2 F | SYSTOLIC BLOOD PRESSURE: 154 MMHG | HEART RATE: 60 BPM | RESPIRATION RATE: 20 BRPM | HEIGHT: 64 IN | DIASTOLIC BLOOD PRESSURE: 75 MMHG | WEIGHT: 234.8 LBS | BODY MASS INDEX: 40.08 KG/M2

## 2018-07-25 DIAGNOSIS — I10 ESSENTIAL HYPERTENSION: Primary | ICD-10-CM

## 2018-07-25 NOTE — MR AVS SNAPSHOT
[address] 
[phone] Patient: Singh Barnes MRN: [id number] :1945 Visit Information Date & Time Provider Department Dept. Phone Encounter #  
 2018  9:30 AM DAISY Penny/ Jake Flores [id number] Your Appointments 2019  9:30 AM  
Follow Up with Jeff Lassiter NP DRUG REHABILITATION  - [facility] ([address]) Appt Note: 1 year f/up supraorbital neuralgia cr  
 [address] [facility] [address] [phone]  
  
   
 [place] [address] [facility] [address] [id number] [address] Upcoming Health Maintenance Date Due DTaP/Tdap/Td series (1 - Tdap) 10/8/1968 ZOSTER VACCINE AGE 60> 2007 EYE EXAM RETINAL OR DILATED Q1 2017 GLAUCOMA SCREENING Q2Y 2018 Influenza Age 5 to Adult 2018 HEMOGLOBIN A1C Q6M 2018 FOOT EXAM Q1 2019 LIPID PANEL Q1 3/23/2019 MICROALBUMIN Q1 2019 BREAST CANCER SCRN MAMMOGRAM 2020 Bone Densitometry 2020 COLONOSCOPY 2026 Allergies as of 2018  Review Complete On: 2018 By: Marc Tam MD  
  
 Severity Noted Reaction Type Reactions Hydroxyzine  2016    Other (comments) Caused memory loss Levaquin [Levofloxacin]  2016    Diarrhea Lipitor [Atorvastatin]  2014   Side Effect Myalgia Lisinopril  2012   Side Effect Cough Penicillins  2011    Rash  
 Sulfa (Sulfonamide Antibiotics)  2011    Rash  
 Zoloft [Sertraline]  05/10/2016    Diarrhea Current Immunizations  Reviewed on 2016 Name Date Influenza Vaccine 10/29/2013 Influenza Vaccine (Quad) PF 2015 Influenza Vaccine Split 2012, 2011 10:00 AM  
 Pneumococcal Polysaccharide (PPSV-23) 2012 ZZZ-RETIRED (DO NOT USE) Pneumococcal Vaccine (Unspecified Type) 3/21/2012 Not reviewed this visit Vitals BP Pulse Temp Resp Height(growth percentile) Weight(growth percentile) 154/75 60 98.2 °F (36.8 °C) (Oral) 20 5' 4\" (1.626 m) 234 lb 12.8 oz (106.5 kg) BMI OB Status Smoking Status 40.3 kg/m2 Hysterectomy Never Smoker Vitals History BMI and BSA Data Body Mass Index Body Surface Area  
 40.3 kg/m 2 2.19 m 2 Preferred Pharmacy Pharmacy Name Phone Dona Shanks [id number] [facility], [address] [place] [phone] Your Updated Medication List  
  
   
This list is accurate as of 7/25/18  9:57 AM.  Always use your most recent med list. amLODIPine-valsartan  mg per tablet Commonly known as:  EXFORGE  
TAKE ONE TABLET BY MOUTH DAILY  
  
 aspirin-dipyridamole  mg per SR capsule Commonly known as:  AGGRENOX  
TAKE ONE CAPSULE BY MOUTH TWICE A DAY * BD ULTRA-FINE II LANCETS 30 gauge Misc Generic drug:  lancets TEST DAILY OR AS DIRECTED * One Touch Delica 33 gauge Misc Generic drug:  lancets TEST DAILY AS DIRECTED BY PHYSICIAN. Blood Pressure Monitor Kit Commonly known as:  BLOOD PRESSURE KIT Use 2-3 times a week or as directed Blood-Glucose Meter monitoring kit Commonly known as:  Sydnie Rosalinda Test daily or as directed. glipiZIDE 10 mg tablet Commonly known as:  GLUCOTROL  
TAKE ONE TABLET BY MOUTH TWICE A DAY  
  
 hydroCHLOROthiazide 25 mg tablet Commonly known as:  HYDRODIURIL  
TAKE ONE TABLET BY MOUTH DAILY **GENERIC FOR: HYDRODIURIL  
  
 JANUMET -1,000 mg Tm24 Generic drug:  SITagliptin-metFORMIN  
TAKE ONE TABLET BY MOUTH DAILY BEFORE DINNER  
  
 metoprolol succinate 200 mg XL tablet Commonly known as:  TOPROL-XL  
TAKE ONE TABLET BY MOUTH DAILY  
  
 ONETOUCH ULTRA TEST strip Generic drug:  glucose blood VI test strips USE TO TEST BLOOD SUGAR DAILY OR AS DIRECTED BY PHYSICIAN OXcarbazepine 300 mg tablet Commonly known as:  TRILEPTAL  
TAKE ONE AND ONE-HALF TABLET BY MOUTH TWO TIMES A DAY PriLOSEC 20 mg capsule Generic drug:  omeprazole Take 1 Cap by mouth daily. * Notice: This list has 2 medication(s) that are the same as other medications prescribed for you. Read the directions carefully, and ask your doctor or other care provider to review them with you. Introducing [facility] & HEALTH SERVICES! Dear Vanna Ferrara: 
Thank you for requesting a Cariloop account. Our records indicate that you already have an active Cariloop account. You can access your account anytime at [email or web address] Did you know that you can access your hospital and ER discharge instructions at any time in Cariloop? You can also review all of your test results from your hospital stay or ER visit. Additional Information If you have questions, please visit the Frequently Asked Questions section of the Cariloop website at [email or web address] Remember, Cariloop is NOT to be used for urgent needs. For medical emergencies, dial 911. Now available from your iPhone and Android! Please provide this summary of care documentation to your next provider. Your primary care clinician is listed as Tegan Nixon. If you have any questions after today's visit, please call [phone].

## 2018-07-25 NOTE — PROGRESS NOTES
HISTORY OF PRESENT ILLNESS  Colletta Ceo is a 81 y.o. female. HPI Comments: Colletta Ceo is here for follow up on her HTN. She did not check her home blood pressures because the cuff she got was too small and she has to send it back. Hypertension   The history is provided by the patient. This is a chronic problem. The current episode started more than 1 week ago. The problem occurs constantly. The problem has been gradually worsening. Pertinent negatives include no chest pain, no abdominal pain, no headaches and no shortness of breath. Exacerbated by: coming to the doctor's office. The symptoms are relieved by medications. Treatments tried: HCTZ, Exforge, Toprol XL. The treatment provided mild relief. Review of Systems   Constitutional: Negative for weight loss. No weight gain   Eyes: Negative for blurred vision. Respiratory: Negative for shortness of breath. Cardiovascular: Negative for chest pain and leg swelling. Gastrointestinal: Negative for abdominal pain. Neurological: Negative for dizziness, sensory change, speech change, focal weakness and headaches. Visit Vitals    /75    Pulse 60    Temp 98.2 °F (36.8 °C) (Oral)    Resp 20    Ht 5' 4\" (1.626 m)    Wt 234 lb 12.8 oz (106.5 kg)    BMI 40.3 kg/m2     BP Readings from Last 3 Encounters:   07/25/18 162/74   06/18/18 140/84   04/25/18 154/75     Physical Exam   Constitutional: She is oriented to person, place, and time. She appears well-developed and well-nourished. No distress. Cardiovascular: Normal rate and regular rhythm. Exam reveals no gallop and no friction rub. Murmur heard. Pulmonary/Chest: Effort normal and breath sounds normal. No respiratory distress. She has no wheezes. She has no rales. Musculoskeletal: She exhibits no edema. Neurological: She is alert and oriented to person, place, and time. Skin: Skin is warm and dry. She is not diaphoretic.    Nursing note and vitals reviewed. ASSESSMENT and PLAN    ICD-10-CM ICD-9-CM    1. Essential hypertension I10 401.9         Blood pressure elevated, likely white coat syndrome  Obtain the correct sized blood pressure cuff and check home blood pressures  Bring home blood pressure to next visit    Follow-up Disposition:  Return in about 2 months (around 9/25/2018) for blood pressure - bring home blood pressure. Reviewed plan of care. Patient has provided input and agrees with goals.

## 2018-08-02 ENCOUNTER — TELEPHONE (OUTPATIENT)
Dept: FAMILY MEDICINE CLINIC | Age: 73
End: 2018-08-02

## 2018-08-02 NOTE — TELEPHONE ENCOUNTER
----- Message from Blair Velez sent at 8/2/2018 12:43 PM EDT -----  Regarding: Dr. Keshawn Gallardo with [facility] requested clarification on the dosage for Rx \"Repatha 140mg\"  Best contact: [phone]- [facility] (Reference number [id number])

## 2018-08-02 NOTE — TELEPHONE ENCOUNTER
Called and spoke with [facility] pharmacist and clarification has been made that 420 MG is dosage and pt is to take once monthly. RX will be dispensed.

## 2018-09-04 ENCOUNTER — TELEPHONE (OUTPATIENT)
Dept: FAMILY MEDICINE CLINIC | Age: 73
End: 2018-09-04

## 2018-09-04 NOTE — TELEPHONE ENCOUNTER
Leigha with [facility] calling to check status of prior authorization on Repatha.  Please call [phone]

## 2018-09-06 NOTE — TELEPHONE ENCOUNTER
Prior authorization has been faxed. It has been noted on prior authorization form, that pt has only been on medication for 30 days and it is too soon for labs to be drawn.

## 2018-10-03 ENCOUNTER — OFFICE VISIT (OUTPATIENT)
Dept: FAMILY MEDICINE CLINIC | Age: 73
End: 2018-10-03

## 2018-10-03 VITALS
DIASTOLIC BLOOD PRESSURE: 77 MMHG | SYSTOLIC BLOOD PRESSURE: 164 MMHG | HEART RATE: 62 BPM | TEMPERATURE: 98.5 F | BODY MASS INDEX: 39.44 KG/M2 | HEIGHT: 64 IN | RESPIRATION RATE: 18 BRPM | WEIGHT: 231 LBS

## 2018-10-03 DIAGNOSIS — E11.21 TYPE 2 DIABETES WITH NEPHROPATHY (HCC): ICD-10-CM

## 2018-10-03 DIAGNOSIS — F32.1 DEPRESSION, MAJOR, SINGLE EPISODE, MODERATE (HCC): ICD-10-CM

## 2018-10-03 DIAGNOSIS — Z59.9 FINANCIAL DIFFICULTIES: ICD-10-CM

## 2018-10-03 DIAGNOSIS — I10 ESSENTIAL HYPERTENSION: Primary | ICD-10-CM

## 2018-10-03 DIAGNOSIS — Z23 ENCOUNTER FOR IMMUNIZATION: ICD-10-CM

## 2018-10-03 RX ORDER — SPIRONOLACTONE 25 MG/1
TABLET ORAL
COMMUNITY
Start: 2018-08-08 | End: 2018-10-03 | Stop reason: SDUPTHER

## 2018-10-03 RX ORDER — EVOLOCUMAB 420 MG/3.5
KIT SUBCUTANEOUS
COMMUNITY
Start: 2018-09-19 | End: 2018-12-04 | Stop reason: SDUPTHER

## 2018-10-03 RX ORDER — SPIRONOLACTONE 25 MG/1
25 TABLET ORAL DAILY
Qty: 90 TAB | Refills: 0 | Status: SHIPPED | OUTPATIENT
Start: 2018-10-03 | End: 2018-11-28

## 2018-10-03 RX ORDER — HYDROCHLOROTHIAZIDE 25 MG/1
TABLET ORAL
Qty: 90 TAB | Refills: 0 | Status: SHIPPED | OUTPATIENT
Start: 2018-10-03 | End: 2019-10-24 | Stop reason: SDUPTHER

## 2018-10-03 RX ORDER — BUPROPION HYDROCHLORIDE 150 MG/1
150 TABLET ORAL DAILY
Qty: 30 TAB | Refills: 0 | Status: SHIPPED | OUTPATIENT
Start: 2018-10-03 | End: 2018-10-31 | Stop reason: SDUPTHER

## 2018-10-03 RX ORDER — AMLODIPINE BESYLATE 10 MG/1
10 TABLET ORAL DAILY
Qty: 30 TAB | Refills: 1 | Status: SHIPPED | OUTPATIENT
Start: 2018-10-03 | End: 2018-12-13 | Stop reason: SDUPTHER

## 2018-10-03 RX ORDER — LOSARTAN POTASSIUM 100 MG/1
100 TABLET ORAL DAILY
Qty: 90 TAB | Refills: 0 | Status: SHIPPED | OUTPATIENT
Start: 2018-10-03 | End: 2019-01-30 | Stop reason: SDUPTHER

## 2018-10-03 SDOH — ECONOMIC STABILITY - INCOME SECURITY: PROBLEM RELATED TO HOUSING AND ECONOMIC CIRCUMSTANCES, UNSPECIFIED: Z59.9

## 2018-10-03 NOTE — MR AVS SNAPSHOT
[address] 
[phone] Patient: Juan Rodríguez MRN: [id number] :1945 Visit Information Date & Time Provider Department Dept. Phone Encounter #  
 10/3/2018  9:00 AM Ruyd Schultz 34 [id number] Follow-up Instructions Return in about 3 weeks (around 10/24/2018) for DEPRESSION, BLOOD PRESSURE - BRING HOME BLOOD PRESSURES AND MACHINE. Your Appointments 2019  9:30 AM  
Follow Up with Shweta Jasso NP  [facility] ([facility]) Appt Note: 1 year f/up supraorbital neuralgia cr  
 [address] [facility] 99 [id number] [phone]  
  
   
 [place] [address] [facility] [address] [id number] [address] Upcoming Health Maintenance Date Due DTaP/Tdap/Td series (1 - Tdap) 10/8/1968 Shingrix Vaccine Age 50> (1 of 2) 10/8/1997 EYE EXAM RETINAL OR DILATED Q1 2017 GLAUCOMA SCREENING Q2Y 2018 Influenza Age 5 to Adult 2018 HEMOGLOBIN A1C Q6M 2018 FOOT EXAM Q1 2019 LIPID PANEL Q1 3/23/2019 MICROALBUMIN Q1 2019 BREAST CANCER SCRN MAMMOGRAM 2020 Bone Densitometry 2020 COLONOSCOPY 2026 Allergies as of 10/3/2018  Review Complete On: 10/3/2018 By: Ibeth Fong MD  
  
 Severity Noted Reaction Type Reactions Hydroxyzine  2016    Other (comments) Caused memory loss Levaquin [Levofloxacin]  2016    Diarrhea Lipitor [Atorvastatin]  2014   Side Effect Myalgia Lisinopril  2012   Side Effect Cough Penicillins  2011    Rash  
 Sulfa (Sulfonamide Antibiotics)  2011    Rash  
 Zoloft [Sertraline]  05/10/2016    Diarrhea Current Immunizations  Reviewed on 10/3/2018 Name Date Influenza Vaccine 10/29/2013 Influenza Vaccine (Quad) PF 2015 Influenza Vaccine (Tri) Adjuvanted 10/3/2018 Influenza Vaccine Split 11/16/2012, 9/21/2011 10:00 AM  
 Pneumococcal Polysaccharide (PPSV-23) 12/4/2012 ZZZ-RETIRED (DO NOT USE) Pneumococcal Vaccine (Unspecified Type) 3/21/2012 Reviewed by Shanice Hall LPN on 44/8/6627 at  9:21 AM  
You Were Diagnosed With   
  
 Codes Comments Essential hypertension    -  Primary ICD-10-CM: I10 
ICD-9-CM: 401.9 Depression, major, single episode, moderate ([facility] [address]     ICD-10-CM: F32.1 ICD-9-CM: 296.22 Type 2 diabetes with nephropathy (HCC)     ICD-10-CM: E11.21 
ICD-9-CM: 250.40, 583.81 Financial difficulties     ICD-10-CM: Z59.8 ICD-9-CM: V60.2 Encounter for immunization     ICD-10-CM: Q61 ICD-9-CM: V03.89 Vitals BP Pulse Temp Resp Height(growth percentile) Weight(growth percentile) 164/77 62 98.5 °F (36.9 °C) (Oral) 18 5' 4\" (1.626 m) 231 lb (104.8 kg) BMI OB Status Smoking Status 39.65 kg/m2 Hysterectomy Never Smoker Vitals History BMI and BSA Data Body Mass Index Body Surface Area  
 39.65 kg/m 2 2.18 m 2 Preferred Pharmacy Pharmacy Name Phone [facility] [id number] [place] [phone] Your Updated Medication List  
  
   
This list is accurate as of 10/3/18 10:15 AM.  Always use your most recent med list. amLODIPine 10 mg tablet Commonly known as:  Herschell Saras Take 1 Tab by mouth daily. aspirin-dipyridamole  mg per SR capsule Commonly known as:  AGGRENOX  
TAKE ONE CAPSULE BY MOUTH TWICE A DAY * BD ULTRA-FINE II LANCETS 30 gauge Misc Generic drug:  lancets TEST DAILY OR AS DIRECTED * One Touch Delica 33 gauge Misc Generic drug:  lancets TEST DAILY AS DIRECTED BY PHYSICIAN. Blood Pressure Monitor Kit Commonly known as:  BLOOD PRESSURE KIT Use 2-3 times a week or as directed Blood-Glucose Meter monitoring kit Commonly known as:  Selestino Abdi Test daily or as directed. buPROPion  mg tablet Commonly known as:  Wallene Micah Take 1 Tab by mouth daily. glipiZIDE 10 mg tablet Commonly known as:  GLUCOTROL  
TAKE ONE TABLET BY MOUTH TWICE A DAY  
  
 hydroCHLOROthiazide 25 mg tablet Commonly known as:  HYDRODIURIL  
TAKE ONE TABLET BY MOUTH DAILY **GENERIC FOR: HYDRODIURIL  
  
 JANUMET -1,000 mg Tm24 Generic drug:  SITagliptin-metFORMIN  
TAKE ONE TABLET BY MOUTH DAILY BEFORE DINNER  
  
 losartan 100 mg tablet Commonly known as:  COZAAR Take 1 Tab by mouth daily. metoprolol succinate 200 mg XL tablet Commonly known as:  TOPROL-XL  
TAKE ONE TABLET BY MOUTH DAILY  
  
 ONETOUCH ULTRA TEST strip Generic drug:  glucose blood VI test strips USE TO TEST BLOOD SUGAR DAILY OR AS DIRECTED BY PHYSICIAN OXcarbazepine 300 mg tablet Commonly known as:  TRILEPTAL  
TAKE ONE AND ONE-HALF TABLET BY MOUTH TWO TIMES A DAY PriLOSEC 20 mg capsule Generic drug:  omeprazole Take 1 Cap by mouth daily. REPATHA PUSHTRONEX 420 mg/3.5 mL Injt Generic drug:  evolocumab  
  
 spironolactone 25 mg tablet Commonly known as:  ALDACTONE Take 1 Tab by mouth daily. * Notice: This list has 2 medication(s) that are the same as other medications prescribed for you. Read the directions carefully, and ask your doctor or other care provider to review them with you. Prescriptions Printed Refills  
 spironolactone (ALDACTONE) 25 mg tablet 0 Sig: Take 1 Tab by mouth daily. Class: Print Route: Oral  
 hydroCHLOROthiazide (HYDRODIURIL) 25 mg tablet 0 Sig: TAKE ONE TABLET BY MOUTH DAILY **GENERIC FOR: HYDRODIURIL Class: Print  
 losartan (COZAAR) 100 mg tablet 0 Sig: Take 1 Tab by mouth daily. Class: Print Route: Oral  
  
Prescriptions Sent to Pharmacy  Refills  
 amLODIPine (NORVASC) 10 mg tablet 1  
 Sig: Take 1 Tab by mouth daily. Class: Normal  
 Pharmacy: [facility] [address] [address] Ph #: [phone] Route: Oral  
 buPROPion XL (WELLBUTRIN XL) 150 mg tablet 0 Sig: Take 1 Tab by mouth daily. Class: Normal  
 Pharmacy: [facility] [address] [address] Ph #: [phone] Route: Oral  
  
We Performed the Following HEMOGLOBIN A1C WITH EAG [34141 CPT(R)] INFLUENZA VACCINE INACTIVATED (IIV), SUBUNIT, ADJUVANTED, IM [id number] CPT(R)] METABOLIC PANEL, BASIC [02405 CPT(R)] Follow-up Instructions Return in about 3 weeks (around 10/24/2018) for DEPRESSION, BLOOD PRESSURE - BRING HOME BLOOD PRESSURES AND MACHINE. Patient Instructions MEDICATIONS: 
 
STOP EXFORGE/AMLODIPINE-VALSARTAN 
 
START LOSARTAN (FREE AT [place] [address]) START AMLODIPINE Introducing [facility]! Dear Nima Griffin: 
Thank you for requesting a [facility] account. Our records indicate that you already have an active [employer] [facility] account. You can access your account anytime at [email or web address] Did you know that you can access your hospital and ER discharge instructions at any time in [facility]? You can also review all of your test results from your hospital stay or ER visit. Additional Information If you have questions, please visit the Frequently Asked Questions section of the [employer] [facility] website at [email or web address] Remember, [facility] is NOT to be used for urgent needs. For medical emergencies, dial 911. Now available from your iPhone and Android! Please provide this summary of care documentation to your next provider. Your primary care clinician is listed as Davi Cassidy. If you have any questions after today's visit, please call [phone].

## 2018-10-03 NOTE — PATIENT INSTRUCTIONS
MEDICATIONS: 
 
STOP EXFORGE/AMLODIPINE-VALSARTAN 
 
START LOSARTAN (FREE AT [facility]) START AMLODIPINE

## 2018-10-03 NOTE — PROGRESS NOTES
Chief Complaint Patient presents with  Hypertension 2 mo f/u 1. Have you been to the ER, urgent care clinic since your last visit? No  Hospitalized since your last visit? No  
 
2. Have you seen or consulted any other health care providers outside of the [address] [place] [address] since your last visit? Include any pap smears or colon screening. No  
 
Health Maintenance Due Topic Date Due  
 DTaP/Tdap/Td  (1 - Tdap) 10/08/1968  Shingles Vaccine (1 of 2) 10/08/1997 Agueda Antonio Eye Exam  04/25/2017  Glaucoma Screening   04/25/2018  Flu Vaccine  08/01/2018  Hemoglobin A1C    09/23/2018

## 2018-10-03 NOTE — PROGRESS NOTES
HISTORY OF PRESENT ILLNESS Arlene Bowser is a 81 y.o. female. HPI Comments: Arlene Bowser is here for follow up on her HTN. She forgot her home blood pressures, but she did get an appropriate sized cuff. Also, she is due for diabetic labs. She has a history of depression and \"it's not going so good right now\". She is having problems with finances and work. Unfortunately, she cannot afford to quit. She has been on Effexor in the past, but thinks it did not work. As she is having financial difficulties, she does not want to be on another pill. Hypertension The history is provided by the patient. This is a chronic problem. The current episode started more than 1 week ago. The problem occurs constantly. The problem has been gradually worsening. Pertinent negatives include no chest pain, no abdominal pain, no headaches and no shortness of breath. Nothing aggravates the symptoms. The symptoms are relieved by medications. Treatments tried: spironolactone, Exforge, Toprol XL. Improvement on treatment: unknown. Review of Systems Constitutional: Negative for weight loss. No weight gain Eyes: Negative for blurred vision. Respiratory: Negative for shortness of breath. Cardiovascular: Negative for chest pain and leg swelling. Gastrointestinal: Negative for abdominal pain. Neurological: Negative for dizziness, sensory change, speech change, focal weakness and headaches. Psychiatric/Behavioral: Positive for depression and memory loss. Negative for substance abuse and suicidal ideas. The patient is nervous/anxious and has insomnia. Lab Results Component Value Date/Time Hemoglobin A1c 7.3 (H) 03/23/2018 09:40 AM  
 Hemoglobin A1c (POC) 6.6 03/26/2014 10:42 AM  
   
 
Visit Vitals  /77  Pulse 62  Temp 98.5 °F (36.9 °C) (Oral)  Resp 18  Ht 5' 4\" (1.626 m)  Wt 231 lb (104.8 kg)  BMI 39.65 kg/m2 BP Readings from Last 3 Encounters: 10/03/18 164/77  
07/25/18 154/75  
06/18/18 140/84 Physical Exam  
Constitutional: She is oriented to person, place, and time. She appears well-developed and well-nourished. No distress. Cardiovascular: Normal rate, regular rhythm and normal heart sounds. Exam reveals no gallop and no friction rub. No murmur heard. Pulmonary/Chest: Effort normal and breath sounds normal. No respiratory distress. She has no wheezes. She has no rales. Musculoskeletal: She exhibits no edema. Neurological: She is alert and oriented to person, place, and time. Skin: Skin is warm and dry. She is not diaphoretic. Psychiatric: Her speech is normal and behavior is normal. Judgment and thought content normal. She exhibits a depressed mood. tearful Nursing note and vitals reviewed. ASSESSMENT and PLAN 
  ICD-10-CM ICD-9-CM 1. Essential hypertension I10 401.9 amLODIPine (NORVASC) 10 mg tablet METABOLIC PANEL, BASIC 2. Depression, major, single episode, moderate ([facility]) F32.1 296.22 buPROPion XL (WELLBUTRIN XL) 150 mg tablet 3. Type 2 diabetes with nephropathy ([facility]) X97.79 174.81 METABOLIC PANEL, BASIC  
  583.81 HEMOGLOBIN A1C WITH EAG 4. Financial difficulties Z59.8 V60.2 5. Encounter for immunization Z23 V03.89 INFLUENZA VACCINE INACTIVATED (IIV), SUBUNIT, ADJUVANTED, IM Blood pressure elevated, forgot home blood pressures Symptomatic depression, failed Zoloft and possibly Effexor Needs A1C Difficulty affording medications Stop Exforge due to cost 
Start losartan and Norvasc Advised she can get 3 of her medications for $2.50 a month at [facility] Consider changing Toprol XL to something cheaper Labs per orders. Start Wellbutrin Flu shot Follow-up Disposition: 
Return in about 3 weeks (around 10/24/2018) for DEPRESSION, BLOOD PRESSURE - BRING HOME BLOOD PRESSURES AND MACHINE. Reviewed plan of care. Patient has provided input and agrees with goals.

## 2018-10-04 LAB
BUN SERPL-MCNC: 7 MG/DL (ref 8–27)
BUN/CREAT SERPL: 10 (ref 12–28)
CALCIUM SERPL-MCNC: 9.4 MG/DL (ref 8.7–10.3)
CHLORIDE SERPL-SCNC: 99 MMOL/L (ref 96–106)
CO2 SERPL-SCNC: 24 MMOL/L (ref 20–29)
CREAT SERPL-MCNC: 0.71 MG/DL (ref 0.57–1)
EST. AVERAGE GLUCOSE BLD GHB EST-MCNC: 183 MG/DL
GLUCOSE SERPL-MCNC: 203 MG/DL (ref 65–99)
HBA1C MFR BLD: 8 % (ref 4.8–5.6)
POTASSIUM SERPL-SCNC: 4.6 MMOL/L (ref 3.5–5.2)
SODIUM SERPL-SCNC: 141 MMOL/L (ref 134–144)

## 2018-10-08 ENCOUNTER — TELEPHONE (OUTPATIENT)
Dept: FAMILY MEDICINE CLINIC | Age: 73
End: 2018-10-08

## 2018-10-09 NOTE — TELEPHONE ENCOUNTER
Attempted to contact patient at the telephone number on file, no answer, left a message on patient's voicemail to return call to office at her earliest convenience.

## 2018-10-09 NOTE — TELEPHONE ENCOUNTER
Please call patient and let her know her blood sugar is not controlled. She needs to schedule an appointment with me and bring her blood sugars.

## 2018-10-15 NOTE — TELEPHONE ENCOUNTER
Called and spoke with pt and she has been advised and states understanding of labs and she is currently scheduled for 10/24/2018 and will keep that appointment to discuss.

## 2018-10-30 DIAGNOSIS — F32.1 DEPRESSION, MAJOR, SINGLE EPISODE, MODERATE (HCC): ICD-10-CM

## 2018-10-31 DIAGNOSIS — F32.1 DEPRESSION, MAJOR, SINGLE EPISODE, MODERATE (HCC): ICD-10-CM

## 2018-10-31 RX ORDER — BUPROPION HYDROCHLORIDE 150 MG/1
150 TABLET ORAL DAILY
Qty: 30 TAB | Refills: 0 | Status: SHIPPED | OUTPATIENT
Start: 2018-10-31 | End: 2018-11-01 | Stop reason: SDUPTHER

## 2018-10-31 NOTE — TELEPHONE ENCOUNTER
Pt needs a refill on her Wellbutrin and had to reschedule her 10/24/18 follow-up for this due to dental appointments. Her next appt is now 11/28/18. She will be out 11/02/18. Please call if Dr Toni Briceño unable to fill.     [phone]

## 2018-11-01 RX ORDER — BUPROPION HYDROCHLORIDE 150 MG/1
TABLET ORAL
Qty: 30 TAB | Refills: 11 | Status: SHIPPED | OUTPATIENT
Start: 2018-11-01 | End: 2018-11-28

## 2018-11-28 ENCOUNTER — OFFICE VISIT (OUTPATIENT)
Dept: FAMILY MEDICINE CLINIC | Age: 73
End: 2018-11-28

## 2018-11-28 ENCOUNTER — TELEPHONE (OUTPATIENT)
Dept: FAMILY MEDICINE CLINIC | Age: 73
End: 2018-11-28

## 2018-11-28 VITALS
RESPIRATION RATE: 18 BRPM | HEIGHT: 64 IN | WEIGHT: 231 LBS | BODY MASS INDEX: 39.44 KG/M2 | SYSTOLIC BLOOD PRESSURE: 175 MMHG | DIASTOLIC BLOOD PRESSURE: 84 MMHG | HEART RATE: 64 BPM | TEMPERATURE: 97.7 F

## 2018-11-28 DIAGNOSIS — Z59.9 FINANCIAL DIFFICULTIES: ICD-10-CM

## 2018-11-28 DIAGNOSIS — I10 ESSENTIAL HYPERTENSION: ICD-10-CM

## 2018-11-28 DIAGNOSIS — F32.1 DEPRESSION, MAJOR, SINGLE EPISODE, MODERATE (HCC): ICD-10-CM

## 2018-11-28 DIAGNOSIS — Z00.00 MEDICARE ANNUAL WELLNESS VISIT, SUBSEQUENT: ICD-10-CM

## 2018-11-28 DIAGNOSIS — E78.00 ELEVATED CHOLESTEROL: ICD-10-CM

## 2018-11-28 DIAGNOSIS — Z13.31 SCREENING FOR DEPRESSION: ICD-10-CM

## 2018-11-28 RX ORDER — BUPROPION HYDROCHLORIDE 300 MG/1
TABLET ORAL
Qty: 30 TAB | Refills: 1 | Status: SHIPPED | OUTPATIENT
Start: 2018-11-28 | End: 2018-12-04 | Stop reason: SDUPTHER

## 2018-11-28 RX ORDER — SPIRONOLACTONE 25 MG/1
50 TABLET ORAL DAILY
Qty: 180 TAB | Refills: 0 | Status: SHIPPED | OUTPATIENT
Start: 2018-11-28 | End: 2019-02-23 | Stop reason: SDUPTHER

## 2018-11-28 SDOH — ECONOMIC STABILITY - INCOME SECURITY: PROBLEM RELATED TO HOUSING AND ECONOMIC CIRCUMSTANCES, UNSPECIFIED: Z59.9

## 2018-11-28 NOTE — PROGRESS NOTES
Chief Complaint Patient presents with  Annual Wellness Visit  
  depression f/u 1. Have you been to the ER, urgent care clinic since your last visit? Hospitalized since your last visit? No  
 
2. Have you seen or consulted any other health care providers outside of the [facility] since your last visit? Include any pap smears or colon screening.  No

## 2018-11-28 NOTE — PROGRESS NOTES
HISTORY OF PRESENT ILLNESS Choco Glaser is a 72 y.o. female. Choco Glaser is here for follow up on her depression and for a blood pressure check. Currently, she is taking Wellbutrin, which is working fairly well, but she thinks she could use a little more improvement. Thinking about financial and family issues make her worse. Doing happy things make her feel better. She did not bring in her home blood pressures. Also, she is now able to afford her medications. Review of Systems Constitutional: Positive for weight loss. Negative for malaise/fatigue. No weight gain Respiratory: Negative for shortness of breath. Cardiovascular: Negative for chest pain and palpitations. Psychiatric/Behavioral: Positive for depression. Negative for substance abuse and suicidal ideas. The patient has insomnia. The patient is not nervous/anxious. Visit Vitals /84 Pulse 64 Temp 97.7 °F (36.5 °C) (Oral) Resp 18 Ht 5' 4\" (1.626 m) Wt 231 lb (104.8 kg) BMI 39.65 kg/m² BP Readings from Last 3 Encounters:  
11/28/18 175/84  
10/03/18 164/77  
07/25/18 154/75 Physical Exam  
Constitutional: She is oriented to person, place, and time. She appears well-developed and well-nourished. No distress. Cardiovascular: Normal rate, regular rhythm and normal heart sounds. Exam reveals no gallop and no friction rub. No murmur heard. Pulmonary/Chest: Effort normal and breath sounds normal. No respiratory distress. She has no wheezes. She has no rales. Musculoskeletal: She exhibits no edema. Neurological: She is alert and oriented to person, place, and time. She displays no tremor. Skin: Skin is warm and dry. She is not diaphoretic. Psychiatric: She has a normal mood and affect. Her behavior is normal. Judgment and thought content normal.  
Nursing note and vitals reviewed. ASSESSMENT and PLAN 
  ICD-10-CM ICD-9-CM 1. Screening for depression Z13.31 V79.0 [facility] 68 2. Depression, major, single episode, moderate (HCC) F32.1 296.22 buPROPion XL (WELLBUTRIN XL) 300 mg XL tablet 3. Essential hypertension I10 401.9 spironolactone (ALDACTONE) 25 mg tablet METABOLIC PANEL, BASIC 4. Elevated cholesterol E78.00 272.0 NMR LIPOPROFILE  
   HEPATIC FUNCTION PANEL 5. Financial difficulties Z59.8 V60.2 Depression improving Blood pressure needs better control Due for lipids Able to afford her medications Increase Wellbutrin Increase spironolactone Labs per orders. Follow-up Disposition: 
Return in about 6 weeks (around 1/9/2019) for blood pressure, diabetes, depression. Reviewed plan of care. Patient has provided input and agrees with goals. This is the Subsequent Medicare Annual Wellness Exam, performed 12 months or more after the Initial AWV or the last Subsequent AWV I have reviewed the patient's medical history in detail and updated the computerized patient record. History Past Medical History:  
Diagnosis Date  ACP (advance care planning) 2/9/2016 Patient plans to prepare an advanced directive and bring it in  Anxiety 5/31/2016  Asymptomatic bilateral carotid artery stenosis 7/8/2018  Cataracts, bilateral 8/1/2011  Depression, major, single episode, moderate ([address] 8/1/2011 Sees Dr Claudette De Anda  Diabetic nephropathy ([facility] [address] 8/1/2011  Essential hypertension  Financial difficulties 7/30/2012  Hyperlipidemia  Obesity  SKYLER (obstructive sleep apnea) 2/9/2016  Stroke ([facility] [address] 2004 ARABELLA  
 TIA (transient ischemic attack) 8/1/2011 Past Surgical History:  
Procedure Laterality Date  HX GYN    
 HX HYSTERECTOMY    
 over 25 years  HX MYOMECTOMY    
 oiver 30 years Current Outpatient Medications Medication Sig Dispense Refill  buPROPion XL (WELLBUTRIN XL) 150 mg tablet TAKE ONE TABLET BY MOUTH DAILY 30 Tab 11  
 [facility] 420 mg/3.5 mL Injt  spironolactone (ALDACTONE) 25 mg tablet Take 1 Tab by mouth daily. 90 Tab 0  
 hydroCHLOROthiazide (HYDRODIURIL) 25 mg tablet TAKE ONE TABLET BY MOUTH DAILY **GENERIC FOR: HYDRODIURIL 90 Tab 0  
 losartan (COZAAR) 100 mg tablet Take 1 Tab by mouth daily. 90 Tab 0  
 amLODIPine (NORVASC) 10 mg tablet Take 1 Tab by mouth daily. 30 Tab 1  
 OXcarbazepine (TRILEPTAL) 300 mg tablet TAKE ONE AND ONE-HALF TABLET BY MOUTH TWO TIMES A DAY 90 Tab 11  
 aspirin-dipyridamole (AGGRENOX)  mg per SR capsule TAKE ONE CAPSULE BY MOUTH TWICE A DAY 60 Cap 11  
 glipiZIDE (GLUCOTROL) 10 mg tablet TAKE ONE TABLET BY MOUTH TWICE A DAY 60 Tab 5  
 omeprazole (PRILOSEC) 20 mg capsule Take 1 Cap by mouth daily.  JANUMET -1,000 mg TM24 TAKE ONE TABLET BY MOUTH DAILY BEFORE DINNER 30 Tab 5  
 metoprolol succinate (TOPROL-XL) 200 mg XL tablet TAKE ONE TABLET BY MOUTH DAILY 90 Tab 0  
 ONE TOUCH DELICA 33 gauge misc TEST DAILY AS DIRECTED BY PHYSICIAN. 100 Lancet PRN  
 ONETOUCH ULTRA TEST strip USE TO TEST BLOOD SUGAR DAILY OR AS DIRECTED BY PHYSICIAN 50 Strip PRN  Blood Pressure Monitor (BLOOD PRESSURE KIT) kit Use 2-3 times a week or as directed 1 Kit 0  Blood-Glucose Meter (ONETOUCH ULTRAMINI) monitoring kit Test daily or as directed. 1 Kit 0  
 BD ULTRA-FINE II LANCETS 30 gauge misc TEST DAILY OR AS DIRECTED 200 Package prn Allergies Allergen Reactions  Hydroxyzine Other (comments) Caused memory loss  Levaquin [Levofloxacin] Diarrhea  Lipitor [Atorvastatin] Myalgia  Lisinopril Cough  Penicillins Rash  Sulfa (Sulfonamide Antibiotics) Rash  Zoloft [Sertraline] Diarrhea Family History Problem Relation Age of Onset  Hypertension Mother  Seizures Mother  Hypertension Son  Diabetes Son  Elevated Lipids Son  Ataxia Daughter  Seizures Brother  Cancer Maternal Aunt Social History Tobacco Use  
  Smoking status: Never Smoker  Smokeless tobacco: Never Used Substance Use Topics  Alcohol use: Yes Alcohol/week: 0.6 oz Types: 1 Standard drinks or equivalent per week Comment: occasionally Patient Active Problem List  
Diagnosis Code  Elevated cholesterol E78.00  Cataracts, bilateral H26.9  TIA (transient ischemic attack) G45.9  Depression, major, single episode, moderate (HCC) F32.1  Urinary incontinence R32  Essential hypertension I10  
 Financial difficulties Z59.8  Snoring R06.83  
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  
 SKYLER (obstructive sleep apnea) G47.33  
 ACP (advance care planning) Z71.89  
 Anxiety F41.9  Severe obesity (BMI 35.0-39. 9) E66.01  
 Asymptomatic bilateral carotid artery stenosis I65.23 Depression Risk Factor Screening: PHQ over the last two weeks 11/28/2018 Little interest or pleasure in doing things Not at all Feeling down, depressed, irritable, or hopeless Not at all Total Score PHQ 2 0 Alcohol Risk Factor Screening: You do not drink alcohol or very rarely. Functional Ability and Level of Safety:  
Hearing Loss Hearing is good. Activities of Daily Living The home contains: no safety equipment. Patient does total self care Fall Risk Fall Risk Assessment, last 12 mths 11/28/2018 Able to walk? Yes Fall in past 12 months? No  
Fall with injury? -  
Number of falls in past 12 months - Fall Risk Score -  
 
 
Abuse Screen Patient is not abused Cognitive Screening Evaluation of Cognitive Function: 
Has your family/caregiver stated any concerns about your memory: no 
Normal, Clock Drawing test 
 
Patient Care Team  
Patient Care Team: 
Paige Knox MD as PCP - [facility]) Cristobal Javier MD (Orthopedic Surgery) Zonia Stevenson LPN as Nurse Navigator Tammy Dumas MD (Cardiology) Amber Yap MD (Nephrology) Beth Meyer MD (Neurology) Jonathan Antonio MD as Physician (Sleep Medicine) Carmen Baez MD (Gastroenterology) Assessment/Plan Education and counseling provided: 
Are appropriate based on today's review and evaluation End-of-Life planning (with patient's consent) Patient plans to complete an advanced directive and bring it in 
[address] was discussed but the patient did not wish or was not able to name a surrogate decision maker or provide an [address] Diagnoses and all orders for this visit: 1. Screening for depression 
-     [address] 2. Depression, major, single episode, moderate ([facility] [address] 3. Essential hypertension 
-     spironolactone (ALDACTONE) 25 mg tablet; Take 2 Tabs by mouth daily. 
-     METABOLIC PANEL, BASIC 4. Elevated cholesterol 
-     NMR LIPOPROFILE 
-     HEPATIC FUNCTION PANEL 5. Financial difficulties 6. Medicare annual wellness visit, subsequent Other orders -     CVD REPORT Health Maintenance Due Topic Date Due  
 DTaP/Tdap/Td series (1 - Tdap) 10/08/1968  Shingrix Vaccine Age 50> (1 of 2) 10/08/1997  
 EYE EXAM RETINAL OR DILATED Q1  04/25/2017  GLAUCOMA SCREENING Q2Y  04/25/2018  MEDICARE YEARLY EXAM  10/18/2018

## 2018-11-28 NOTE — PATIENT INSTRUCTIONS
Medicare Wellness Visit, Female The best way to live healthy is to have a lifestyle where you eat a well-balanced diet, exercise regularly, limit alcohol use, and quit all forms of tobacco/nicotine, if applicable. Regular preventive services are another way to keep healthy. Preventive services (vaccines, screening tests, monitoring & exams) can help personalize your care plan, which helps you manage your own care. Screening tests can find health problems at the earliest stages, when they are easiest to treat. [facility] follows the current, evidence-based guidelines published by the [facility] ([facility]) when recommending preventive services for our patients. Because we follow these guidelines, sometimes recommendations change over time as research supports it. (For example, mammograms used to be recommended annually. Even though Medicare will still pay for an annual mammogram, the newer guidelines recommend a mammogram every two years for women of average risk.) Of course, you and your doctor may decide to screen more often for some diseases, based on your risk and your health status. Preventive services for you include: - Medicare offers their members a free annual wellness visit, which is time for you and your primary care provider to discuss and plan for your preventive service needs. Take advantage of this benefit every year! 
-All adults over the age of 72 should receive the recommended pneumonia vaccines. Current USPSTF guidelines recommend a series of two vaccines for the best pneumonia protection.  
-All adults should have a flu vaccine yearly and a tetanus vaccine every 10 years. All adults age 61 and older should receive a shingles vaccine once in their lifetime.   
-A bone mass density test is recommended when a woman turns 65 to screen for osteoporosis. This test is only recommended one time, as a screening. Some providers will use this same test as a disease monitoring tool if you already have osteoporosis. -All adults age 38-68 who are overweight should have a diabetes screening test once every three years.  
-Other screening tests and preventive services for persons with diabetes include: an eye exam to screen for diabetic retinopathy, a kidney function test, a foot exam, and stricter control over your cholesterol.  
-Cardiovascular screening for adults with routine risk involves an electrocardiogram (ECG) at intervals determined by your doctor.  
-Colorectal cancer screenings should be done for adults age 54-65 with no increased risk factors for colorectal cancer. There are a number of acceptable methods of screening for this type of cancer. Each test has its own benefits and drawbacks. Discuss with your doctor what is most appropriate for you during your annual wellness visit. The different tests include: colonoscopy (considered the best screening method), a fecal occult blood test, a fecal DNA test, and sigmoidoscopy. -Breast cancer screenings are recommended every other year for women of normal risk, age 54-69. 
-Cervical cancer screenings for women over age 72 are only recommended with certain risk factors.  
-All adults born between [facility] should be screened once for Hepatitis C. Here is a list of your current Health Maintenance items (your personalized list of preventive services) with a due date: 
Health Maintenance Due Topic Date Due  
 DTaP/Tdap/Td  (1 - Tdap) 10/08/1968  Shingles Vaccine (1 of 2) 10/08/1997 RupinderxaIsa Eye Exam  04/25/2017  Glaucoma Screening   04/25/2018 Yuki Annual Well Visit  10/18/2018 Medicare Wellness Visit, Female The best way to live healthy is to have a lifestyle where you eat a well-balanced diet, exercise regularly, limit alcohol use, and quit all forms of tobacco/nicotine, if applicable. Regular preventive services are another way to keep healthy. Preventive services (vaccines, screening tests, monitoring & exams) can help personalize your care plan, which helps you manage your own care. Screening tests can find health problems at the earliest stages, when they are easiest to treat. [facility] follows the current, evidence-based guidelines published by the [facility] Carrillo (USPSTF) when recommending preventive services for our patients. Because we follow these guidelines, sometimes recommendations change over time as research supports it. (For example, mammograms used to be recommended annually. Even though Medicare will still pay for an annual mammogram, the newer guidelines recommend a mammogram every two years for women of average risk.) Of course, you and your doctor may decide to screen more often for some diseases, based on your risk and your health status. Preventive services for you include: - Medicare offers their members a free annual wellness visit, which is time for you and your primary care provider to discuss and plan for your preventive service needs. Take advantage of this benefit every year! 
-All adults over the age of 72 should receive the recommended pneumonia vaccines. Current USPSTF guidelines recommend a series of two vaccines for the best pneumonia protection.  
-All adults should have a flu vaccine yearly and a tetanus vaccine every 10 years. All adults age 61 and older should receive a shingles vaccine once in their lifetime.   
-A bone mass density test is recommended when a woman turns 65 to screen for osteoporosis. This test is only recommended one time, as a screening. Some providers will use this same test as a disease monitoring tool if you already have osteoporosis. -All adults age 38-68 who are overweight should have a diabetes screening test once every three years. -Other screening tests and preventive services for persons with diabetes include: an eye exam to screen for diabetic retinopathy, a kidney function test, a foot exam, and stricter control over your cholesterol.  
-Cardiovascular screening for adults with routine risk involves an electrocardiogram (ECG) at intervals determined by your doctor.  
-Colorectal cancer screenings should be done for adults age 54-65 with no increased risk factors for colorectal cancer. There are a number of acceptable methods of screening for this type of cancer. Each test has its own benefits and drawbacks. Discuss with your doctor what is most appropriate for you during your annual wellness visit. The different tests include: colonoscopy (considered the best screening method), a fecal occult blood test, a fecal DNA test, and sigmoidoscopy. -Breast cancer screenings are recommended every other year for women of normal risk, age 54-69. 
-Cervical cancer screenings for women over age 72 are only recommended with certain risk factors.  
-All adults born between [facility] should be screened once for Hepatitis C. Here is a list of your current Health Maintenance items (your personalized list of preventive services) with a due date: 
Health Maintenance Due Topic Date Due  
 DTaP/Tdap/Td  (1 - Tdap) 10/08/1968  Shingles Vaccine (1 of 2) 10/08/1997 Shreya.Sic Eye Exam  04/25/2017  Glaucoma Screening   04/25/2018 Shreya.Sic Annual Well Visit  10/18/2018

## 2018-11-30 LAB
ALBUMIN SERPL-MCNC: 4.3 G/DL (ref 3.5–4.8)
ALP SERPL-CCNC: 101 IU/L (ref 39–117)
ALT SERPL-CCNC: 14 IU/L (ref 0–32)
AST SERPL-CCNC: 15 IU/L (ref 0–40)
BILIRUB DIRECT SERPL-MCNC: 0.06 MG/DL (ref 0–0.4)
BILIRUB SERPL-MCNC: <0.2 MG/DL (ref 0–1.2)
BUN SERPL-MCNC: 10 MG/DL (ref 8–27)
BUN/CREAT SERPL: 12 (ref 12–28)
CALCIUM SERPL-MCNC: 9.2 MG/DL (ref 8.7–10.3)
CHLORIDE SERPL-SCNC: 104 MMOL/L (ref 96–106)
CHOLEST SERPL-MCNC: 309 MG/DL (ref 100–199)
CO2 SERPL-SCNC: 23 MMOL/L (ref 20–29)
CREAT SERPL-MCNC: 0.82 MG/DL (ref 0.57–1)
GLUCOSE SERPL-MCNC: 118 MG/DL (ref 65–99)
HDL SERPL-SCNC: 28.9 UMOL/L
HDLC SERPL-MCNC: 49 MG/DL
INTERPRETATION, 910389: NORMAL
LDL SERPL QN: 20.7 NM
LDL SERPL-SCNC: 2271 NMOL/L
LDL SMALL SERPL-SCNC: 861 NMOL/L
LDLC SERPL CALC-MCNC: 231 MG/DL (ref 0–99)
LP-IR SCORE SERPL: 65
POTASSIUM SERPL-SCNC: 4.8 MMOL/L (ref 3.5–5.2)
PROT SERPL-MCNC: 7.7 G/DL (ref 6–8.5)
SODIUM SERPL-SCNC: 144 MMOL/L (ref 134–144)
TRIGL SERPL-MCNC: 144 MG/DL (ref 0–149)

## 2018-12-03 ENCOUNTER — TELEPHONE (OUTPATIENT)
Dept: FAMILY MEDICINE CLINIC | Age: 73
End: 2018-12-03

## 2018-12-03 DIAGNOSIS — F32.1 DEPRESSION, MAJOR, SINGLE EPISODE, MODERATE (HCC): ICD-10-CM

## 2018-12-03 DIAGNOSIS — E78.00 ELEVATED CHOLESTEROL: Primary | ICD-10-CM

## 2018-12-03 NOTE — TELEPHONE ENCOUNTER
----- Message from Phebe Dakin sent at 12/3/2018  3:42 PM EST -----  Regarding: Dr. Sixto Soto: [address] [facility] [address] with pt's pharmacy called to request new Rx for pt. She advises that she called on November 28th and left a message with Davon Tellez but that they have not received new Rx yet. She advises that a detailed message can be left if there is no answer.

## 2018-12-04 RX ORDER — BUPROPION HYDROCHLORIDE 300 MG/1
TABLET ORAL
Qty: 30 TAB | Refills: 1 | Status: SHIPPED | OUTPATIENT
Start: 2018-12-04 | End: 2019-08-30

## 2018-12-04 RX ORDER — EVOLOCUMAB 420 MG/3.5
420 KIT SUBCUTANEOUS
Qty: 3 CARTRIDGE | Refills: 0 | Status: SHIPPED | OUTPATIENT
Start: 2018-12-04 | End: 2019-02-12

## 2018-12-13 DIAGNOSIS — I10 ESSENTIAL HYPERTENSION: ICD-10-CM

## 2018-12-13 DIAGNOSIS — E11.21 TYPE 2 DIABETES WITH NEPHROPATHY (HCC): ICD-10-CM

## 2018-12-16 ENCOUNTER — TELEPHONE (OUTPATIENT)
Dept: FAMILY MEDICINE CLINIC | Age: 73
End: 2018-12-16

## 2018-12-16 RX ORDER — SITAGLIPTIN AND METFORMIN HYDROCHLORIDE 100; 1000 MG/1; MG/1
TABLET, FILM COATED, EXTENDED RELEASE ORAL
Qty: 30 TAB | Refills: 5 | Status: SHIPPED | OUTPATIENT
Start: 2018-12-16 | End: 2019-08-30 | Stop reason: SDUPTHER

## 2018-12-16 RX ORDER — AMLODIPINE BESYLATE 10 MG/1
TABLET ORAL
Qty: 30 TAB | Refills: 1 | Status: SHIPPED | OUTPATIENT
Start: 2018-12-16 | End: 2019-02-25 | Stop reason: SDUPTHER

## 2018-12-16 NOTE — LETTER
12/24/2018 3:05 PM 
 
Ms. Arlene Bowser [address] [facility] Dear Ms. Keysland Bobo, We have been unable to reach you by phone to notify you of your test results. Please call our office at [phone] and ask to speak with my nurse in order to explain these results to you and advise you of any recommendations.  
 
 
 
Sincerely, 
 
 
Harry Colón MD

## 2019-01-02 NOTE — TELEPHONE ENCOUNTER
Pt called office back and states she has been taking the Mary Ellen Ely has directed. Pt does ask of she suppose to take Crestor along with the Repatha? Pt states if she does not answer the phone, a voice message can be left on her phone.

## 2019-01-03 DIAGNOSIS — E78.00 ELEVATED CHOLESTEROL: ICD-10-CM

## 2019-01-03 RX ORDER — ROSUVASTATIN CALCIUM 40 MG/1
TABLET, COATED ORAL
Qty: 30 TAB | Refills: 3 | Status: SHIPPED | OUTPATIENT
Start: 2019-01-03 | End: 2019-09-13

## 2019-01-03 NOTE — TELEPHONE ENCOUNTER
Please send a RX for Crestor to pt's pharmacy, she is not currently taking Crestor. Called pt, and left a voice message that she does need to continue taking Crestor and that we will send a RX to her pharmacy. Advised pt to call office back with questions or concerns.

## 2019-02-02 RX ORDER — LOSARTAN POTASSIUM 100 MG/1
TABLET ORAL
Qty: 90 TAB | Refills: 0 | Status: SHIPPED | OUTPATIENT
Start: 2019-02-02 | End: 2019-05-13 | Stop reason: SDUPTHER

## 2019-02-03 NOTE — TELEPHONE ENCOUNTER
Please call patient and schedule them for a blood pressure follow up. Let her know I cannot continue to refill her medications until she comes in.

## 2019-02-04 DIAGNOSIS — E11.21 TYPE 2 DIABETES WITH NEPHROPATHY (HCC): ICD-10-CM

## 2019-02-04 NOTE — TELEPHONE ENCOUNTER
Called pt, and left a voice message, that he/she is due for their follow up appointment, here at [facility]. Advised pt to call the office back to schedule their appointment.

## 2019-02-08 RX ORDER — GLIPIZIDE 10 MG/1
TABLET ORAL
Qty: 60 TAB | Refills: 5 | Status: SHIPPED | OUTPATIENT
Start: 2019-02-08 | End: 2019-08-21 | Stop reason: SDUPTHER

## 2019-02-12 ENCOUNTER — HOSPITAL ENCOUNTER (OUTPATIENT)
Dept: GENERAL RADIOLOGY | Age: 74
Discharge: HOME | End: 2019-02-12
Payer: MEDICARE

## 2019-02-12 ENCOUNTER — OFFICE VISIT (OUTPATIENT)
Dept: FAMILY MEDICINE CLINIC | Age: 74
End: 2019-02-12

## 2019-02-12 VITALS
BODY MASS INDEX: 39.95 KG/M2 | SYSTOLIC BLOOD PRESSURE: 180 MMHG | HEART RATE: 59 BPM | TEMPERATURE: 98.6 F | HEIGHT: 64 IN | WEIGHT: 234 LBS | DIASTOLIC BLOOD PRESSURE: 83 MMHG

## 2019-02-12 DIAGNOSIS — F32.1 DEPRESSION, MAJOR, SINGLE EPISODE, MODERATE (HCC): ICD-10-CM
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
## 2019-02-12 DIAGNOSIS — R05.3 CHRONIC COUGH: ICD-10-CM

## 2019-02-12 DIAGNOSIS — E11.21 TYPE 2 DIABETES WITH NEPHROPATHY (HCC): Primary | ICD-10-CM

## 2019-02-12 DIAGNOSIS — I10 ESSENTIAL HYPERTENSION: ICD-10-CM

## 2019-02-12 PROCEDURE — 71046 X-RAY EXAM CHEST 2 VIEWS: CPT

## 2019-02-12 NOTE — PROGRESS NOTES
HISTORY OF PRESENT ILLNESS Magali Fang is a 72 y.o. female. Magali Fang is here for diabetic follow up. Their  FBS have been mostly in the 110's to 140;s. This is a chronic problem which has not changed. Denies chest pain, abdominal pain, headaches (except occasionally), or shortness of breath. It is worsened by unknown, and improved by glipizide and Janumet, which are working moderately. She has not been taking her blood pressures at home. Also, she is here to follow up on her depression. Currently, she is taking Wellbutrin and is feeling better and feels that it working significantly. She fell two days ago. Her legs just gave out. This has not happened before \"but I stagger around in the house\". She has a daily cough. Review of Systems Constitutional: Positive for malaise/fatigue. Negative for weight loss. No weight gain Eyes: Negative for blurred vision. Respiratory: Positive for cough. Negative for sputum production and shortness of breath. Cardiovascular: Negative for chest pain, palpitations and leg swelling. Gastrointestinal: Negative for abdominal pain. Genitourinary: No polyuria Neurological: Negative for dizziness, sensory change, speech change and focal weakness. Endo/Heme/Allergies: Negative for polydipsia. Psychiatric/Behavioral: Negative for depression, substance abuse and suicidal ideas. The patient has insomnia. The patient is not nervous/anxious. She doesn't sleep due to the coughing and back pain that it causes. Lab Results Component Value Date/Time Hemoglobin A1c 8.0 (H) 10/03/2018 10:25 AM  
 Hemoglobin A1c (POC) 6.6 03/26/2014 10:42 AM  
   
 
Visit Vitals /83 Pulse (!) 59 Temp 98.6 °F (37 °C) (Oral) Ht 5' 4\" (1.626 m) Wt 234 lb (106.1 kg) BMI 40.17 kg/m² BP Readings from Last 3 Encounters:  
02/12/19 180/83  
11/28/18 175/84  
10/03/18 164/77 Physical Exam  
 Constitutional: She is oriented to person, place, and time. She appears well-developed and well-nourished. No distress. Cardiovascular: Normal rate, regular rhythm, normal heart sounds and intact distal pulses. Exam reveals no gallop and no friction rub. No murmur heard. Pulmonary/Chest: Effort normal and breath sounds normal. No respiratory distress. She has no wheezes. She has no rales. Neurological: She is alert and oriented to person, place, and time. She displays no tremor. Normal monofilament exam  
Skin: Skin is warm and dry. She is not diaphoretic. Feet and nails in good condition. Psychiatric: She has a normal mood and affect. Her behavior is normal. Judgment and thought content normal.  
Nursing note and vitals reviewed. ASSESSMENT and PLAN 
  ICD-10-CM ICD-9-CM 1. Type 2 diabetes with nephropathy ([facility]) E11.21 250.40  DIABETES FOOT EXAM  
  583.81 REFERRAL TO OPHTHALMOLOGY METABOLIC PANEL, BASIC HEMOGLOBIN A1C WITH EAG 2. Essential hypertension G60 937.2 METABOLIC PANEL, BASIC 3. Depression, major, single episode, moderate ([facility]) F32.1 296.22   
4. Fall, initial encounter [address]. Beata Bull I336.7 5. Chronic cough R05 786.2 XR CHEST PA LAT Uncertain diabetic control Blood pressure elevated, not checking them at home because her machine is not working well Depression doing well Labs per orders. Eye exam 
chest X-ray She is going to get a new blood pressure machine so she can check them at home Follow-up Disposition: 
Return in about 1 week (around 2/19/2019), or gait problems, chronic cough, discuss Repatha. Reviewed plan of care. Patient has provided input and agrees with goals.

## 2019-02-12 NOTE — PROGRESS NOTES
Chief Complaint Patient presents with  Diabetes  
  follow up  Cholesterol Problem  
  pt ha stopped Repatha due to side effects  Fall  
  pt states her labs have been given out on her

## 2019-02-13 ENCOUNTER — TELEPHONE (OUTPATIENT)
Dept: FAMILY MEDICINE CLINIC | Age: 74
End: 2019-02-13

## 2019-02-13 LAB
BUN SERPL-MCNC: 9 MG/DL (ref 8–27)
BUN/CREAT SERPL: 12 (ref 12–28)
CALCIUM SERPL-MCNC: 9.6 MG/DL (ref 8.7–10.3)
CHLORIDE SERPL-SCNC: 95 MMOL/L (ref 96–106)
CO2 SERPL-SCNC: 25 MMOL/L (ref 20–29)
CREAT SERPL-MCNC: 0.75 MG/DL (ref 0.57–1)
EST. AVERAGE GLUCOSE BLD GHB EST-MCNC: 154 MG/DL
GLUCOSE SERPL-MCNC: 107 MG/DL (ref 65–99)
HBA1C MFR BLD: 7 % (ref 4.8–5.6)
POTASSIUM SERPL-SCNC: 4.3 MMOL/L (ref 3.5–5.2)
SODIUM SERPL-SCNC: 138 MMOL/L (ref 134–144)

## 2019-02-14 ENCOUNTER — TELEPHONE (OUTPATIENT)
Dept: FAMILY MEDICINE CLINIC | Age: 74
End: 2019-02-14

## 2019-02-14 NOTE — TELEPHONE ENCOUNTER
Mike Mccain called from [address] [place] [address] to advise pt received a defective Repatha injectable pen and this has been approved for replacement at no charge.   Agustín

## 2019-02-19 ENCOUNTER — OFFICE VISIT (OUTPATIENT)
Dept: FAMILY MEDICINE CLINIC | Age: 74
End: 2019-02-19

## 2019-02-19 VITALS
BODY MASS INDEX: 38.93 KG/M2 | DIASTOLIC BLOOD PRESSURE: 84 MMHG | SYSTOLIC BLOOD PRESSURE: 189 MMHG | HEIGHT: 64 IN | WEIGHT: 228 LBS | OXYGEN SATURATION: 97 % | TEMPERATURE: 98.2 F | RESPIRATION RATE: 18 BRPM | HEART RATE: 62 BPM

## 2019-02-19 DIAGNOSIS — R05.3 CHRONIC COUGH: Primary | ICD-10-CM

## 2019-02-19 DIAGNOSIS — M79.651 PAIN IN BOTH THIGHS: ICD-10-CM

## 2019-02-19 DIAGNOSIS — M79.652 PAIN IN BOTH THIGHS: ICD-10-CM
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
## 2019-02-19 DIAGNOSIS — I10 ESSENTIAL HYPERTENSION: ICD-10-CM

## 2019-02-19 DIAGNOSIS — R26.9 GAIT DIFFICULTY: ICD-10-CM

## 2019-02-19 RX ORDER — OMEPRAZOLE 20 MG/1
20 CAPSULE, DELAYED RELEASE ORAL DAILY
Qty: 30 CAP | Refills: 1 | Status: SHIPPED | OUTPATIENT
Start: 2019-02-19 | End: 2019-04-19 | Stop reason: SDUPTHER

## 2019-02-19 RX ORDER — MOMETASONE FUROATE 50 UG/1
2 SPRAY, METERED NASAL DAILY
Qty: 1 CONTAINER | Refills: 5 | Status: SHIPPED | OUTPATIENT
Start: 2019-02-19 | End: 2020-05-14 | Stop reason: SDUPTHER

## 2019-02-19 NOTE — PATIENT INSTRUCTIONS
Using a Nasal Steroid Spray: Care Instructions  Your Care Instructions     Your doctor may suggest using a corticosteroid nasal spray for your allergy symptoms or sinus problems. These sprays reduce the swelling inside the nose and sinuses. Unlike decongestant nasal sprays, steroid sprays won't lead to more swelling when you stop taking them. These sprays start working in a few days, but it may take several weeks before you get the full effect. Most side effects are minor. The most common complaint is a burning feeling in the nose right after the spray is used. Some people get nosebleeds. Follow-up care is a key part of your treatment and safety. Be sure to make and go to all appointments, and call your doctor if you are having problems. It's also a good idea to know your test results and keep a list of the medicines you take. How can you care for yourself at home? Here are some tips for using these sprays:  · You may need to prime the sprayer before you use it. This means spraying it into the air a few times to make sure you get the right amount of medicine. Follow the directions on the label. · Blow your nose before you spray. This will help clear out your nostrils. · Gently sniff the medicine into your nose as you spray. Don't snort, or the medicine will go all the way into your throat where it won't do much good. · Aim the nozzle straight toward the back of your nose. This will help keep the medicine from irritating the inside walls of your nostril, especially your septum (the wall that separates your left and right nostrils). · Don't blow your nose for 10 minutes or so after you spray. And try not to sneeze. · Be safe with medicines. Use this medicine exactly as prescribed. Call your doctor if you think you are having a problem with your medicine. · Clean your sprayer once a week. Read the label to learn how. When should you call for help?   Call your doctor now or seek immediate medical care if:  · You don't understand how to use the medicine. · Your symptoms aren't getting better as expected. · You think you are having a side effect from the medicine. Where can you learn more? Go to http://yasmin-lokesh.info/  Enter C183 in the search box to learn more about \"Using a Nasal Steroid Spray: Care Instructions. \"  © 4002-5191 Healthwise, [facility]. Care instructions adapted under license by [employer] (which disclaims liability or warranty for this information). This care instruction is for use with your licensed healthcare professional. If you have questions about a medical condition or this instruction, always ask your healthcare professional. [address] any warranty or liability for your use of this information.   Content Version: 69.2.566581; Current as of: November 20, 2015

## 2019-02-19 NOTE — PROGRESS NOTES
HISTORY OF PRESENT ILLNESS  Helen Bermudez is a 72 y.o. female. Helen Bermudez is here to follow up on her chronic cough and gait difficulties. The cough is usually nonproductive and has been present for possibly a year. It is getting worse. Also, when she gets to work, she sneezes constantly. Eating makes the cough worse. Cough drops help some. Also, she has postnasal drainage. She feel two weeks ago because her knees gave way and she is staggering around. Also, she has associated severe, bilateral thigh pain. Also, she discontinued her Repatha because she had diarrhea. Review of Systems   Constitutional: Negative for chills, fever and malaise/fatigue. HENT: Negative for congestion, ear pain and sore throat. Eyes: Negative for discharge and redness. Eye tearing   Respiratory: Negative for cough, sputum production, shortness of breath and wheezing. Cardiovascular: Negative for chest pain. Gastrointestinal: Positive for nausea. Negative for abdominal pain, heartburn and vomiting. Nausea when coughing   Genitourinary:        No bladder incontinence   Musculoskeletal: Negative for back pain and falls. Neurological: Positive for headaches. Negative for tingling, sensory change and focal weakness. Visit Vitals  /84   Pulse 62   Temp 98.2 °F (36.8 °C) (Oral)   Resp 18   Ht 5' 4\" (1.626 m)   Wt 228 lb (103.4 kg)   SpO2 97%   BMI 39.14 kg/m²     BP Readings from Last 3 Encounters:   02/19/19 189/84   02/12/19 180/83   11/28/18 175/84     Physical Exam   Constitutional: She is oriented to person, place, and time. She appears well-developed and well-nourished. No distress. HENT:   Head: Normocephalic. Right Ear: Tympanic membrane, external ear and ear canal normal.   Left Ear: Tympanic membrane, external ear and ear canal normal.   Nose: Nose normal. Right sinus exhibits no maxillary sinus tenderness and no frontal sinus tenderness.  Left sinus exhibits no maxillary sinus tenderness and no frontal sinus tenderness. Mouth/Throat: Uvula is midline, oropharynx is clear and moist and mucous membranes are normal.   Eyes: Right eye exhibits no discharge. Left eye exhibits no discharge. Right conjunctiva is not injected. Left conjunctiva is not injected. Cardiovascular: Normal rate, regular rhythm, normal heart sounds and intact distal pulses. Exam reveals no gallop and no friction rub. No murmur heard. Pulmonary/Chest: Effort normal and breath sounds normal. No respiratory distress. She has no wheezes. She has no rales. Abdominal: Soft. Normal appearance and bowel sounds are normal. She exhibits no distension. There is no hepatosplenomegaly. There is no tenderness. There is no rebound and no guarding. Musculoskeletal:        Right knee: She exhibits bony tenderness. She exhibits normal range of motion, no swelling, no effusion, no deformity, no erythema, no LCL laxity, normal meniscus and no MCL laxity. Tenderness found. Medial joint line and lateral joint line tenderness noted. Left knee: She exhibits decreased range of motion and bony tenderness. She exhibits no swelling, no effusion, no deformity, no erythema, no LCL laxity, normal meniscus and no MCL laxity. Tenderness found. Medial joint line and lateral joint line tenderness noted. Lumbar back: She exhibits decreased range of motion. She exhibits no tenderness, no bony tenderness, no pain and no spasm. Lymphadenopathy:     She has no cervical adenopathy. Neurological: She is alert and oriented to person, place, and time. She has normal strength. No sensory deficit. Gait abnormal.   Reflex Scores:       Patellar reflexes are 2+ on the right side and 2+ on the left side. Achilles reflexes are 2+ on the right side and 2+ on the left side. Negative SLRs. Walks stiffly. Skin: Skin is warm and dry. She is not diaphoretic. Nursing note and vitals reviewed.       ASSESSMENT and PLAN ICD-10-CM ICD-9-CM    1. Chronic cough R05 786.2 mometasone (NASONEX) 50 mcg/actuation nasal spray      omeprazole (PRILOSEC) 20 mg capsule   2. Pain in both thighs M79.651 729.5 REFERRAL TO PHYSICAL THERAPY    M79.652     3. Gait difficulty R26.9 781.2 REFERRAL TO PHYSICAL THERAPY   4. Fall, subsequent encounter W19. XXXD V58.89 REFERRAL TO PHYSICAL THERAPY     E888.9    5. Essential hypertension I10 401.9         Chronic cough, gastroesophageal reflux disease vs allergies vs pulmonary  Abnormal gait, fall  Leg pain likely due to fall  Blood pressure elevated  Trial of Prilosec and Flonase  PT referral  Continue to follow up with renal for her blood pressure as she is doing    Follow-up Disposition:  Return in about 6 weeks (around 4/2/2019) for chronic cough. Reviewed plan of care. Patient has provided input and agrees with goals.

## 2019-02-19 NOTE — PROGRESS NOTES
Chief Complaint   Patient presents with    Cough     gait problems 1 wk  f/u     1. Have you been to the ER, urgent care clinic since your last visit? Hospitalized since your last visit? No     2. Have you seen or consulted any other health care providers outside of the [address] [place] [address] since your last visit? Include any pap smears or colon screening.  No

## 2019-02-23 DIAGNOSIS — I10 ESSENTIAL HYPERTENSION: ICD-10-CM

## 2019-02-25 DIAGNOSIS — I10 ESSENTIAL HYPERTENSION: ICD-10-CM

## 2019-02-26 RX ORDER — SPIRONOLACTONE 25 MG/1
TABLET ORAL
Qty: 180 TAB | Refills: 0 | Status: SHIPPED | OUTPATIENT
Start: 2019-02-26 | End: 2019-06-07 | Stop reason: SDUPTHER

## 2019-02-26 RX ORDER — AMLODIPINE BESYLATE 10 MG/1
TABLET ORAL
Qty: 30 TAB | Refills: 1 | Status: SHIPPED | OUTPATIENT
Start: 2019-02-26 | End: 2019-05-16 | Stop reason: SDUPTHER

## 2019-03-02 LAB — LDL-C, EXTERNAL: NORMAL

## 2019-03-03 DIAGNOSIS — E78.00 ELEVATED CHOLESTEROL: ICD-10-CM

## 2019-03-05 RX ORDER — EVOLOCUMAB 420 MG/3.5
KIT SUBCUTANEOUS
Qty: 3 CARTRIDGE | Refills: 2 | Status: SHIPPED | OUTPATIENT
Start: 2019-03-05 | End: 2019-08-30

## 2019-04-19 DIAGNOSIS — R05.3 CHRONIC COUGH: ICD-10-CM

## 2019-04-19 RX ORDER — OMEPRAZOLE 20 MG/1
CAPSULE, DELAYED RELEASE ORAL
Qty: 30 CAP | Refills: 11 | Status: SHIPPED | OUTPATIENT
Start: 2019-04-19 | End: 2020-05-04

## 2019-05-16 DIAGNOSIS — I10 ESSENTIAL HYPERTENSION: ICD-10-CM

## 2019-05-17 RX ORDER — LOSARTAN POTASSIUM 100 MG/1
TABLET ORAL
Qty: 90 TAB | Refills: 0 | Status: SHIPPED | OUTPATIENT
Start: 2019-05-17 | End: 2019-09-01 | Stop reason: SDUPTHER

## 2019-05-19 RX ORDER — AMLODIPINE BESYLATE 10 MG/1
TABLET ORAL
Qty: 30 TAB | Refills: 0 | Status: SHIPPED | OUTPATIENT
Start: 2019-05-19 | End: 2019-06-19 | Stop reason: SDUPTHER

## 2019-06-07 DIAGNOSIS — I10 ESSENTIAL HYPERTENSION: ICD-10-CM

## 2019-06-07 RX ORDER — SPIRONOLACTONE 25 MG/1
TABLET ORAL
Qty: 180 TAB | Refills: 0 | Status: SHIPPED | OUTPATIENT
Start: 2019-06-07 | End: 2019-11-13

## 2019-06-19 DIAGNOSIS — I10 ESSENTIAL HYPERTENSION: ICD-10-CM

## 2019-06-19 NOTE — LETTER
6/21/2019 2:47 PM 
 
Ms. Flex Magallon [address] [facility] Dear Ms. Elizabeth CrockettJose D Anaya missed you! Please call our office at [phone] and schedule a diabetic follow up appointment for your continued care. Look forward to seeing you soon.   
 
 
 
Sincerely, 
 
 
Khadra Roger MD

## 2019-06-21 RX ORDER — AMLODIPINE BESYLATE 10 MG/1
TABLET ORAL
Qty: 30 TAB | Refills: 0 | Status: SHIPPED | OUTPATIENT
Start: 2019-06-21 | End: 2020-05-14 | Stop reason: SDUPTHER

## 2019-08-21 DIAGNOSIS — E11.21 TYPE 2 DIABETES WITH NEPHROPATHY (HCC): ICD-10-CM

## 2019-08-23 RX ORDER — GLIPIZIDE 10 MG/1
TABLET ORAL
Qty: 60 TAB | Refills: 4 | Status: SHIPPED | OUTPATIENT
Start: 2019-08-23 | End: 2020-01-26

## 2019-08-30 ENCOUNTER — OFFICE VISIT (OUTPATIENT)
Dept: FAMILY MEDICINE CLINIC | Age: 74
End: 2019-08-30

## 2019-08-30 VITALS
HEIGHT: 64 IN | OXYGEN SATURATION: 98 % | DIASTOLIC BLOOD PRESSURE: 75 MMHG | BODY MASS INDEX: 40.22 KG/M2 | HEART RATE: 57 BPM | SYSTOLIC BLOOD PRESSURE: 170 MMHG | WEIGHT: 235.6 LBS | TEMPERATURE: 98.7 F | RESPIRATION RATE: 16 BRPM

## 2019-08-30 DIAGNOSIS — M79.604 PAIN IN BOTH LOWER EXTREMITIES: ICD-10-CM

## 2019-08-30 DIAGNOSIS — I10 ESSENTIAL HYPERTENSION: ICD-10-CM

## 2019-08-30 DIAGNOSIS — E11.21 TYPE 2 DIABETES WITH NEPHROPATHY (HCC): ICD-10-CM

## 2019-08-30 DIAGNOSIS — E78.00 ELEVATED CHOLESTEROL: ICD-10-CM

## 2019-08-30 DIAGNOSIS — M79.605 PAIN IN BOTH LOWER EXTREMITIES: ICD-10-CM

## 2019-08-30 DIAGNOSIS — R05.9 COUGH: Primary | ICD-10-CM

## 2019-08-30 NOTE — PROGRESS NOTES
HISTORY OF PRESENT ILLNESS  Kaila King is a 72 y.o. female. Kaila King is here for follow up on her chronic cough. It is almost gone. At her last visit, I started her Flonase and Prilosec and they are working well. Also, she has been having pain in both legs for a month or so. It is getting worse. The pain gets worse with walking. Motrin helps. She is diabetic with elevated lipids but does not smoke. Her blood pressure is high today. She is only taking the hydrochlorothiazide and not the spironolactone because she cannot make it to the bathroom in time. A renal appointment has been set up next month for her HTN. Review of Systems   Constitutional: Negative for chills and fever. HENT: Negative for congestion. Eyes: Positive for blurred vision. She lost her glasses and has an eye appointment next month. Respiratory: Negative for cough, shortness of breath and wheezing. Cardiovascular: Positive for leg swelling. Negative for chest pain. Gastrointestinal: Negative for abdominal pain, heartburn, nausea and vomiting. Visit Vitals  /75 (BP 1 Location: Right arm, BP Patient Position: Sitting)   Pulse (!) 57   Temp 98.7 °F (37.1 °C) (Oral)   Resp 16   Ht 5' 4\" (1.626 m)   Wt 235 lb 9.6 oz (106.9 kg)   SpO2 98%   BMI 40.44 kg/m²     Physical Exam   Constitutional: She is oriented to person, place, and time. She appears well-developed and well-nourished. No distress. Cardiovascular: Normal rate, regular rhythm and normal heart sounds. Exam reveals no gallop and no friction rub. No murmur heard. Pulses:       Dorsalis pedis pulses are 1+ on the right side, and 1+ on the left side. Posterior tibial pulses are 1+ on the right side, and 1+ on the left side. Pulmonary/Chest: Effort normal and breath sounds normal. No respiratory distress. She has no wheezes. She has no rales. Musculoskeletal: She exhibits no edema.    Neurological: She is alert and oriented to person, place, and time. Skin: Skin is warm and dry. She is not diaphoretic. Nursing note and vitals reviewed. ASSESSMENT and PLAN    ICD-10-CM ICD-9-CM    1. Cough R05 786.2 CBC WITH AUTOMATED DIFF   2. Pain in both lower extremities M79.604 729.5 ANKLE BRACHIAL INDEX    M79.605     3. Essential hypertension L97 287.5 METABOLIC PANEL, COMPREHENSIVE   4. Type 2 diabetes with nephropathy (HCC) W43.67 934.95 METABOLIC PANEL, COMPREHENSIVE     583.81 SITagliptin-metFORMIN (JANUMET XR) 100-1,000 mg TM24      HEMOGLOBIN A1C WITH EAG   5. Elevated cholesterol N90.64 658.2 METABOLIC PANEL, COMPREHENSIVE      LIPID PANEL        Chronic cough, almost resolved  Leg pain, need to rule out claudication  Blood pressure remains elevated  ABIs  Labs per orders. Refills per orders  Follow up with renal as planned    Follow-up and Dispositions    · Return in about 2 weeks (around 9/13/2019), or leg pain, review ABIs. Reviewed plan of care. Patient has provided input and agrees with goals.

## 2019-08-30 NOTE — PROGRESS NOTES
Chief Complaint   Patient presents with    Cholesterol Problem    Hypertension    Diabetes    Headache    Incontinence       1. Have you been to the ER, urgent care clinic since your last visit? Hospitalized since your last visit? [facility]  Congestion     2. Have you seen or consulted any other health care providers outside of the [address] [place] [address] since your last visit? Include any pap smears or colon screening.  No     Health Maintenance Due   Topic Date Due    DTaP/Tdap/Td series (1 - Tdap) 10/08/1968    Shingrix Vaccine Age 50> (1 of 2) 10/08/1997    Pneumococcal 65+ years (2 of 2 - PCV13) 12/04/2013    MICROALBUMIN Q1  04/25/2019    Influenza Age 9 to Adult  08/01/2019    HEMOGLOBIN A1C Q6M  08/12/2019

## 2019-08-31 LAB
ALBUMIN SERPL-MCNC: 4.3 G/DL (ref 3.5–4.8)
ALBUMIN/GLOB SERPL: 1.2 {RATIO} (ref 1.2–2.2)
ALP SERPL-CCNC: 119 IU/L (ref 39–117)
ALT SERPL-CCNC: 22 IU/L (ref 0–32)
AST SERPL-CCNC: 18 IU/L (ref 0–40)
BASOPHILS # BLD AUTO: 0 X10E3/UL (ref 0–0.2)
BASOPHILS NFR BLD AUTO: 0 %
BILIRUB SERPL-MCNC: <0.2 MG/DL (ref 0–1.2)
BUN SERPL-MCNC: 12 MG/DL (ref 8–27)
BUN/CREAT SERPL: 16 (ref 12–28)
CALCIUM SERPL-MCNC: 9.7 MG/DL (ref 8.7–10.3)
CHLORIDE SERPL-SCNC: 94 MMOL/L (ref 96–106)
CHOLEST SERPL-MCNC: 478 MG/DL (ref 100–199)
CO2 SERPL-SCNC: 27 MMOL/L (ref 20–29)
COMMENT, 011824: ABNORMAL
CREAT SERPL-MCNC: 0.74 MG/DL (ref 0.57–1)
EOSINOPHIL # BLD AUTO: 0.1 X10E3/UL (ref 0–0.4)
EOSINOPHIL NFR BLD AUTO: 1 %
ERYTHROCYTE [DISTWIDTH] IN BLOOD BY AUTOMATED COUNT: 15.8 % (ref 12.3–15.4)
EST. AVERAGE GLUCOSE BLD GHB EST-MCNC: 194 MG/DL
GLOBULIN SER CALC-MCNC: 3.6 G/DL (ref 1.5–4.5)
GLUCOSE SERPL-MCNC: 152 MG/DL (ref 65–99)
HBA1C MFR BLD: 8.4 % (ref 4.8–5.6)
HCT VFR BLD AUTO: 40 % (ref 34–46.6)
HDLC SERPL-MCNC: 52 MG/DL
HGB BLD-MCNC: 12.8 G/DL (ref 11.1–15.9)
IMM GRANULOCYTES # BLD AUTO: 0 X10E3/UL (ref 0–0.1)
IMM GRANULOCYTES NFR BLD AUTO: 1 %
INTERPRETATION, 910389: NORMAL
LDLC SERPL CALC-MCNC: 391 MG/DL (ref 0–99)
LYMPHOCYTES # BLD AUTO: 1.9 X10E3/UL (ref 0.7–3.1)
LYMPHOCYTES NFR BLD AUTO: 45 %
Lab: NORMAL
MCH RBC QN AUTO: 26.4 PG (ref 26.6–33)
MCHC RBC AUTO-ENTMCNC: 32 G/DL (ref 31.5–35.7)
MCV RBC AUTO: 83 FL (ref 79–97)
MONOCYTES # BLD AUTO: 0.3 X10E3/UL (ref 0.1–0.9)
MONOCYTES NFR BLD AUTO: 8 %
NEUTROPHILS # BLD AUTO: 2 X10E3/UL (ref 1.4–7)
NEUTROPHILS NFR BLD AUTO: 45 %
PLATELET # BLD AUTO: 313 X10E3/UL (ref 150–450)
POTASSIUM SERPL-SCNC: 3.8 MMOL/L (ref 3.5–5.2)
PROT SERPL-MCNC: 7.9 G/DL (ref 6–8.5)
RBC # BLD AUTO: 4.84 X10E6/UL (ref 3.77–5.28)
SODIUM SERPL-SCNC: 139 MMOL/L (ref 134–144)
TRIGL SERPL-MCNC: 175 MG/DL (ref 0–149)
VLDLC SERPL CALC-MCNC: 35 MG/DL (ref 5–40)
WBC # BLD AUTO: 4.3 X10E3/UL (ref 3.4–10.8)

## 2019-09-01 RX ORDER — LOSARTAN POTASSIUM 100 MG/1
TABLET ORAL
Qty: 90 TAB | Refills: 0 | Status: SHIPPED | OUTPATIENT
Start: 2019-09-01 | End: 2019-10-27 | Stop reason: SDUPTHER

## 2019-09-06 ENCOUNTER — TELEPHONE (OUTPATIENT)
Dept: FAMILY MEDICINE CLINIC | Age: 74
End: 2019-09-06

## 2019-09-06 NOTE — TELEPHONE ENCOUNTER
Pharmacist called to advise that Losartan 100 MG is on back order, however, he does have 50 Mg available. Pharmacist to dispense 50 Mg and advise pt to take 2 tablets. This has vance Ok's per Dr. Lionel Dodd.

## 2019-09-10 ENCOUNTER — TELEPHONE (OUTPATIENT)
Dept: FAMILY MEDICINE CLINIC | Age: 74
End: 2019-09-10

## 2019-09-10 NOTE — TELEPHONE ENCOUNTER
Called and spoke with pt, and she states she is calling her insurance to ask why they are not covering test.

## 2019-09-13 ENCOUNTER — OFFICE VISIT (OUTPATIENT)
Dept: FAMILY MEDICINE CLINIC | Age: 74
End: 2019-09-13

## 2019-09-13 VITALS
DIASTOLIC BLOOD PRESSURE: 81 MMHG | RESPIRATION RATE: 18 BRPM | SYSTOLIC BLOOD PRESSURE: 169 MMHG | BODY MASS INDEX: 40.97 KG/M2 | WEIGHT: 240 LBS | HEIGHT: 64 IN | HEART RATE: 68 BPM | TEMPERATURE: 98.2 F

## 2019-09-13 DIAGNOSIS — M25.551 PAIN OF BOTH HIP JOINTS: ICD-10-CM

## 2019-09-13 DIAGNOSIS — M25.552 PAIN OF BOTH HIP JOINTS: ICD-10-CM

## 2019-09-13 DIAGNOSIS — M79.604 PAIN IN BOTH LOWER EXTREMITIES: Primary | ICD-10-CM

## 2019-09-13 DIAGNOSIS — M25.562 CHRONIC PAIN OF BOTH KNEES: ICD-10-CM

## 2019-09-13 DIAGNOSIS — M25.561 CHRONIC PAIN OF BOTH KNEES: ICD-10-CM

## 2019-09-13 DIAGNOSIS — M79.605 PAIN IN BOTH LOWER EXTREMITIES: Primary | ICD-10-CM

## 2019-09-13 DIAGNOSIS — G89.29 CHRONIC PAIN OF BOTH KNEES: ICD-10-CM

## 2019-09-13 RX ORDER — IBUPROFEN 600 MG/1
600 TABLET ORAL
Qty: 40 TAB | Refills: 1 | Status: SHIPPED | OUTPATIENT
Start: 2019-09-13 | End: 2019-11-15 | Stop reason: SDUPTHER

## 2019-09-13 NOTE — PROGRESS NOTES
HISTORY OF PRESENT ILLNESS  Librado Lopez is a 72 y.o. female. Patient presents with:  Leg Pain: 2 wk f/u; pt states she has not had test, bilateral; pt still having pain; constant pain    The leg pain has been present for about 4-5 months and is getting worse. She can barely get up in the am.  The pain starts in both anterior thighs and goes into the whole leg. Also, she has right groin pain and decreased range of motion with hip flexion. The pain is a dull ache and continuous. Walking makes it worse. Advil eases the pain but does not stop it. Denies low back pain. Her MARCO ANTONIO's are scheduled in 3 days. Review of Systems   Constitutional: Negative for chills and fever. Cardiovascular: Negative for leg swelling. Musculoskeletal: Negative for falls. Neurological: Positive for weakness. Negative for tingling and speech change. Visit Vitals  /81   Pulse 68   Temp 98.2 °F (36.8 °C) (Oral)   Resp 18   Ht 5' 4\" (1.626 m)   Wt 240 lb (108.9 kg)   BMI 41.20 kg/m²     BP Readings from Last 3 Encounters:   09/13/19 169/81   08/30/19 170/75   02/19/19 189/84     Physical Exam   Constitutional: She is oriented to person, place, and time. She appears well-developed and well-nourished. No distress. Cardiovascular: Normal rate and regular rhythm. Musculoskeletal: She exhibits no edema. Right hip: She exhibits decreased range of motion, tenderness and bony tenderness. Left hip: She exhibits decreased range of motion, tenderness and bony tenderness. Right knee: She exhibits decreased range of motion and bony tenderness. She exhibits no swelling, no effusion, no erythema and normal meniscus. Tenderness found. Medial joint line and lateral joint line tenderness noted. Left knee: She exhibits decreased range of motion and bony tenderness. She exhibits no swelling, no effusion, no deformity, no erythema and normal meniscus. Tenderness found.  Lateral joint line tenderness noted.        Lumbar back: She exhibits decreased range of motion. She exhibits no tenderness, no bony tenderness, no pain and no spasm. Right upper leg: She exhibits tenderness. Left upper leg: She exhibits tenderness. Right lower leg: She exhibits tenderness. Left lower leg: She exhibits tenderness. Pain in both hips with passive range of motion. Neurological: She is alert and oriented to person, place, and time. She has normal strength. No sensory deficit. Gait normal.   Reflex Scores:       Patellar reflexes are 2+ on the right side and 2+ on the left side. Achilles reflexes are 2+ on the right side and 2+ on the left side. Negative SLRs   Skin: Skin is warm and dry. She is not diaphoretic. ASSESSMENT and PLAN    ICD-10-CM ICD-9-CM    1. Pain in both lower extremities M79.604 729.5 ibuprofen (MOTRIN) 600 mg tablet    M79.605     2. Pain of both hip joints M25.551 719.45 XR HIP RT W OR WO PELV 2-3 VWS    M25.552  ibuprofen (MOTRIN) 600 mg tablet   3. Chronic pain of both knees M25.561 719.46 XR KNEE RT MIN 4 V    M25.562 338.29 ibuprofen (MOTRIN) 600 mg tablet    G89.29          I feel her leg pain is related to knee and hip osteoarthritis, however, due to her diminished pulses at her last visit, I still want her to have MARCO ANTONIO's  Hip and knee x-ray  Motrin, heat prn    Follow-up and Dispositions    · Return pending x-ray results. Reviewed plan of care. Patient has provided input and agrees with goals.

## 2019-09-13 NOTE — PROGRESS NOTES
Chief Complaint   Patient presents with    Leg Pain     2 wk f/u; pt states she has not had test, bilateral; pt still having pain; constant pain     1. Have you been to the ER, urgent care clinic since your last visit? Hospitalized since your last visit? No     2. Have you seen or consulted any other health care providers outside of the [address] [place] [address] since your last visit? Include any pap smears or colon screening.  No

## 2019-09-16 ENCOUNTER — HOSPITAL ENCOUNTER (OUTPATIENT)
Dept: VASCULAR SURGERY | Age: 74
Discharge: HOME | End: 2019-09-16
Attending: FAMILY MEDICINE
Payer: MEDICARE

## 2019-09-16 ENCOUNTER — HOSPITAL ENCOUNTER (OUTPATIENT)
Dept: GENERAL RADIOLOGY | Age: 74
Discharge: HOME | End: 2019-09-16
Attending: FAMILY MEDICINE
Payer: MEDICARE

## 2019-09-16 DIAGNOSIS — G89.29 CHRONIC PAIN OF BOTH KNEES: ICD-10-CM

## 2019-09-16 DIAGNOSIS — M79.605 PAIN IN BOTH LOWER EXTREMITIES: ICD-10-CM

## 2019-09-16 DIAGNOSIS — M25.561 CHRONIC PAIN OF BOTH KNEES: ICD-10-CM

## 2019-09-16 DIAGNOSIS — M25.552 PAIN OF BOTH HIP JOINTS: ICD-10-CM

## 2019-09-16 DIAGNOSIS — M25.562 CHRONIC PAIN OF BOTH KNEES: ICD-10-CM

## 2019-09-16 DIAGNOSIS — M25.551 PAIN OF BOTH HIP JOINTS: ICD-10-CM

## 2019-09-16 DIAGNOSIS — M79.604 PAIN IN BOTH LOWER EXTREMITIES: ICD-10-CM

## 2019-09-16 LAB
LEFT ABI: 1.1
LEFT ANTERIOR TIBIAL: 225 MMHG
LEFT ARM BP: 204 MMHG
LEFT POSTERIOR TIBIAL: 223 MMHG
LEFT TBI: 0.62
LEFT TOE PRESSURE: 126 MMHG
RIGHT ABI: 1.25
RIGHT ANTERIOR TIBIAL: 239 MMHG
RIGHT ARM BP: 204 MMHG
RIGHT POSTERIOR TIBIAL: 254 MMHG
RIGHT TBI: 0.62
RIGHT TOE PRESSURE: 126 MMHG

## 2019-09-16 PROCEDURE — 93922 UPR/L XTREMITY ART 2 LEVELS: CPT

## 2019-09-16 PROCEDURE — 73502 X-RAY EXAM HIP UNI 2-3 VIEWS: CPT

## 2019-09-16 PROCEDURE — 73562 X-RAY EXAM OF KNEE 3: CPT

## 2019-09-18 ENCOUNTER — HOSPITAL ENCOUNTER (OUTPATIENT)
Dept: MAMMOGRAPHY | Age: 74
Discharge: HOME | End: 2019-09-18
Attending: FAMILY MEDICINE
Payer: MEDICARE

## 2019-09-18 DIAGNOSIS — Z12.31 SCREENING MAMMOGRAM, ENCOUNTER FOR: ICD-10-CM

## 2019-09-18 PROCEDURE — 77067 SCR MAMMO BI INCL CAD: CPT

## 2019-09-23 ENCOUNTER — TELEPHONE (OUTPATIENT)
Dept: FAMILY MEDICINE CLINIC | Age: 74
End: 2019-09-23

## 2019-09-24 NOTE — TELEPHONE ENCOUNTER
Called and spoke with pt, and she has been advised and states understanding of x-ray results. Pt does ask for results of her ankle brachial index?

## 2019-10-22 DIAGNOSIS — G50.0 SUPRAORBITAL NEURALGIA: ICD-10-CM

## 2019-10-22 DIAGNOSIS — M54.81 OCCIPITAL NEURALGIA OF RIGHT SIDE: ICD-10-CM

## 2019-10-22 RX ORDER — OXCARBAZEPINE 300 MG/1
TABLET, FILM COATED ORAL
Qty: 90 TAB | Refills: 0 | Status: SHIPPED | OUTPATIENT
Start: 2019-10-22 | End: 2019-11-07 | Stop reason: SDUPTHER

## 2019-10-27 RX ORDER — HYDROCHLOROTHIAZIDE 25 MG/1
TABLET ORAL
Qty: 90 TAB | Refills: 0 | Status: SHIPPED | OUTPATIENT
Start: 2019-10-27 | End: 2020-01-23

## 2019-10-27 RX ORDER — LOSARTAN POTASSIUM 100 MG/1
TABLET ORAL
Qty: 90 TAB | Refills: 0 | Status: SHIPPED | OUTPATIENT
Start: 2019-10-27 | End: 2020-05-14 | Stop reason: SDUPTHER

## 2019-10-27 NOTE — TELEPHONE ENCOUNTER
Please call patient and schedule them for a blood pressure follow up. Let them know I cannot continue to refill their medications until they come in. Mauro Reid

## 2019-11-07 ENCOUNTER — OFFICE VISIT (OUTPATIENT)
Dept: NEUROLOGY | Age: 74
End: 2019-11-07

## 2019-11-07 VITALS
RESPIRATION RATE: 20 BRPM | SYSTOLIC BLOOD PRESSURE: 134 MMHG | BODY MASS INDEX: 40.97 KG/M2 | WEIGHT: 240 LBS | HEIGHT: 64 IN | DIASTOLIC BLOOD PRESSURE: 70 MMHG

## 2019-11-07 DIAGNOSIS — E78.00 ELEVATED CHOLESTEROL: ICD-10-CM

## 2019-11-07 DIAGNOSIS — G50.0 SUPRAORBITAL NEURALGIA: ICD-10-CM

## 2019-11-07 DIAGNOSIS — G45.2 MULTIPLE AND BILATERAL PRECEREBRAL ARTERY SYNDROMES: Primary | ICD-10-CM

## 2019-11-07 DIAGNOSIS — M54.81 OCCIPITAL NEURALGIA OF RIGHT SIDE: ICD-10-CM

## 2019-11-07 RX ORDER — ASPIRIN AND DIPYRIDAMOLE 25; 200 MG/1; MG/1
CAPSULE, EXTENDED RELEASE ORAL
Qty: 60 CAP | Refills: 3 | Status: SHIPPED | OUTPATIENT
Start: 2019-11-07 | End: 2020-05-14 | Stop reason: SDUPTHER

## 2019-11-07 RX ORDER — OXCARBAZEPINE 300 MG/1
600 TABLET, FILM COATED ORAL 2 TIMES DAILY
Qty: 120 TAB | Refills: 3 | Status: SHIPPED | OUTPATIENT
Start: 2019-11-07 | End: 2020-06-16

## 2019-11-07 RX ORDER — ROSUVASTATIN CALCIUM 5 MG/1
5 TABLET, COATED ORAL
Qty: 30 TAB | Refills: 3 | Status: SHIPPED | OUTPATIENT
Start: 2019-11-07 | End: 2020-05-14 | Stop reason: SDUPTHER

## 2019-11-07 NOTE — PROGRESS NOTES
Date:  19     Name:  Tamara France  :  1945  MRN:  [id number]     PCP:  Rain Márquez MD    Chief Complaint   Patient presents with    Head Pain     HISTORY OF PRESENT ILLNESS: Follow up for left supraorbital and right occipital neuralgia and s/p CVA. She continues with Aggrenox for secondary stroke prevention. Due to some bladder incontinence issues, she is seeing a urologist who wants to do a procedure to evaluate and will need her to stop the Aggrenox temporarily. She does follow up with her PCP for ongoing management of her other stroke risk factors which include hypertension, dyslipidemia, and diabetes. She was started on Repatha which she does think was working but it caused diarrhea. As such, she is not taking this anymore. Unfortunately, she has not been able to use statin therapy for cholesterol management either. She does continue taking Trileptal for management of the occipital and supraorbital neuralgia. This does seem to help as long as she can take 1-1/2 tablets per day but she has been finding that she is getting the pain in back of her head and it will run down the neck. This is occurs every other day and then might have a break for 2-3 weeks. It is a sharp pain that lasts a few seconds but leaves her feeling sore afterwards. Except as noted above, denies  fever, chills, cough. No CP or SOB. No dysuria, loss of bowel or bladder control. No Weight loss. Appetite good. Sleeping well. No sweats. No edema. No bruising or bleeding. No nausea or vomit. No diarrhea. No frequency, urgency, No depressive sxs. No anxiety. Denies sore throat, nasal congestion, nasal discharge, epistaxis, tinnitus, hearing loss, back pain, muscle pain, or joint pain.        Current Outpatient Medications   Medication Sig    losartan (COZAAR) 100 mg tablet TAKE ONE TABLET BY MOUTH ONE TIME DAILY    hydroCHLOROthiazide (HYDRODIURIL) 25 mg tablet TAKE ONE TABLET BY MOUTH DAILY **GENERIC FOR: HYDRODIURIL    OXcarbazepine (TRILEPTAL) 300 mg tablet TAKE ONE AND ONE-HALF (1 & 1/2) TABLET BY MOUTH TWO TIMES A DAY    ibuprofen (MOTRIN) 600 mg tablet Take 1 Tab by mouth every six (6) hours as needed for Pain.  SITagliptin-metFORMIN (JANUMET XR) 100-1,000 mg TM24 TAKE ONE TABLET BY MOUTH DAILY BEFORE DINNER    glipiZIDE (GLUCOTROL) 10 mg tablet TAKE ONE TABLET BY MOUTH TWICE A DAY    aspirin-dipyridamole (AGGRENOX)  mg per SR capsule TAKE ONE CAPSULE BY MOUTH TWICE A DAY    amLODIPine (NORVASC) 10 mg tablet TAKE ONE TABLET BY MOUTH DAILY    spironolactone (ALDACTONE) 25 mg tablet TAKE TWO TABLETS BY MOUTH DAILY    ONETOUCH ULTRA BLUE TEST STRIP strip USE TO TEST BLOOD SUGAR DAILY OR AS DIRECTED BY PHYSICIAN    omeprazole (PRILOSEC) 20 mg capsule TAKE ONE CAPSULE BY MOUTH DAILY    mometasone (NASONEX) 50 mcg/actuation nasal spray 2 Sprays by Both Nostrils route daily.  ONE TOUCH DELICA 33 gauge misc TEST DAILY AS DIRECTED BY PHYSICIAN.  metoprolol succinate (TOPROL-XL) 200 mg XL tablet TAKE ONE TABLET BY MOUTH DAILY    Blood Pressure Monitor (BLOOD PRESSURE KIT) kit Use 2-3 times a week or as directed    Blood-Glucose Meter (ONETOUCH ULTRAMINI) monitoring kit Test daily or as directed.  BD ULTRA-FINE II LANCETS 30 gauge misc TEST DAILY OR AS DIRECTED     No current facility-administered medications for this visit.       Allergies   Allergen Reactions    Hydroxyzine Other (comments)     Caused memory loss    Levaquin [Levofloxacin] Diarrhea    Lipitor [Atorvastatin] Myalgia    Lisinopril Cough    Penicillins Rash    Repatha Pushtronex [Evolocumab] Diarrhea    Sulfa (Sulfonamide Antibiotics) Rash    Zoloft [Sertraline] Diarrhea     Past Medical History:   Diagnosis Date    ACP (advance care planning) 2/9/2016    Patient plans to prepare an advanced directive and bring it in    Anxiety 5/31/2016    Asymptomatic bilateral carotid artery stenosis 7/8/2018    Cataracts, bilateral 8/1/2011    [facility] 8/1/2011    Sees Dr Debbie Mireles    Diabetic nephropathy ([facility] 8/1/2011    Essential hypertension     Financial difficulties 7/30/2012    Hyperlipidemia     Obesity     SKYLER (obstructive sleep apnea) 2/9/2016    Stroke ([facility]     2004 ARABELLA    TIA (transient ischemic attack) 8/1/2011     Past Surgical History:   Procedure Laterality Date    HX GYN      HX HYSTERECTOMY      over 25 years    HX MYOMECTOMY      oiver 29 years     Social History     Socioeconomic History    Marital status: SINGLE     Spouse name: Not on file    Number of children: Not on file    Years of education: Not on file    Highest education level: Not on file   Occupational History    Not on file   Social Needs    Financial resource strain: Not on file    Food insecurity:     Worry: Not on file     Inability: Not on file    Transportation needs:     Medical: Not on file     Non-medical: Not on file   Tobacco Use    Smoking status: Never Smoker    Smokeless tobacco: Never Used   Substance and Sexual Activity    Alcohol use:  Yes     Alcohol/week: 1.0 standard drinks     Types: 1 Standard drinks or equivalent per week     Comment: occasionally    Drug use: No    Sexual activity: Not Currently   Lifestyle    Physical activity:     Days per week: Not on file     Minutes per session: Not on file    Stress: Not on file   Relationships    Social connections:     Talks on phone: Not on file     Gets together: Not on file     Attends Sikh service: Not on file     Active member of club or organization: Not on file     Attends meetings of clubs or organizations: Not on file     Relationship status: Not on file    Intimate partner violence:     Fear of current or ex partner: Not on file     Emotionally abused: Not on file     Physically abused: Not on file     Forced sexual activity: Not on file   Other Topics Concern    Not on file   Social History Narrative    Not on file     Family History   Problem Relation Age of Onset    Hypertension Mother     Seizures Mother     Hypertension Son     Diabetes Son     Elevated Lipids Son     Ataxia Daughter     Seizures Brother     Cancer Maternal Aunt        PHYSICAL EXAMINATION:    Visit Vitals  /70   Resp 20   Ht 5' 4\" (1.626 m)   Wt 108.9 kg (240 lb)   BMI 41.20 kg/m²     General: Well defined, nourished, and groomed individual in no acute distress. Neck: Supple, nontender, no bruits, no pain with resistance to active range of motion. Significant neck shoulder and back spasm. There is some tenderness between the shoulders   Heart: Regular rate and rhythm, no murmurs, rub, or gallop. Normal S1S2. Lungs: Clear to auscultation bilaterally with equal chest expansion, no cough, no wheeze   Musculoskeletal: Extremities revealed no edema and had full range of motion of joints. Psych: Good mood and bright affect   NEUROLOGICAL EXAMINATION:   Mental Status: Alert and oriented to person, place, and time with recent and remote memory intact. Attention span and concentration are normal. Speech is fluent with a full fund of knowledge. Cranial Nerves:   II, III, IV, VI: Visual acuity grossly intact. Visual fields are normal.   Pupils are equal, round, and reactive to light and accommodation. Extra-ocular movements are full and fluid. Fundoscopic exam was benign, no ptosis or nystagmus. V-XII: Hearing is grossly intact. Facial features are symmetric, with normal sensation and strength. The palate rises symmetrically and the tongue protrudes midline. Sternocleidomastoids 5/5. Motor Examination: Normal tone, bulk, and strength, 5/5 muscle strength throughout. Coordination: No resting or intention tremor   Gait and Station: Steady while walking. No pronator drift. No muscle wasting or fasiculations noted. Reflexes: DTRs 2+ throughout.    Mild left supraorbital and mild right occipital tenderness with palpation. ASSESSMENT AND PLAN    ICD-10-CM ICD-9-CM    1. Multiple and bilateral precerebral artery syndromes G45.2 433.30 aspirin-dipyridamole (AGGRENOX)  mg per SR capsule      DUPLEX CAROTID BILATERAL   2. Supraorbital neuralgia G50.0 350.1 OXcarbazepine (TRILEPTAL) 300 mg tablet   3. Occipital neuralgia of right side M54.81 723.8 OXcarbazepine (TRILEPTAL) 300 mg tablet   4. Elevated cholesterol E78.00 272.0 rosuvastatin (CRESTOR) 5 mg tablet      ubiquinol-B12-FA-resveratrol 200-5-0.8-400 mg cap      Reinforced secondary stroke prevention today. This included signs and symptoms of stroke, BE-FAST, and when to call for EMS. Stressed importance of recognition and early intervention. LDL goal less than 70 per secondary stroke guidelines. Historically, she has not been able to tolerate statin therapy due to myalgias. I would like for her to try again with 5mg of Crestor and to take this with Ubiquinol. Continue with Aggrenox for secondary stroke prevention as she is a Plavix non-responder. It is fine for her to stop this for her urological procedure. She understands that the risk for recurrent stroke is minimal. It will be up to her urologist to decide when it is medically necessary to stop the Aggrenox prior to her procedure and when it is medically appropriate to restart it. Discussed lifestyle modification and importance of exercise and appropriate diet, weight management, and management of comorbid disease with appropriate follow up visits with primary care and other healthcare providers. Given her diabetes as well as her bilateral carotid bruits and uncontrolled dyslipidemia, she will have a repeat carotid Doppler today. The left supraorbital neuralgia is not bothering her at present. The right occipital neuralgia is more prominent recently. She will increase the Trileptal to 2 tablets twice a day. Follow up three months    Bernie Barone

## 2019-11-12 PROBLEM — E11.8 DM (DIABETES MELLITUS), TYPE 2 WITH COMPLICATIONS (HCC): Status: ACTIVE | Noted: 2019-11-12

## 2019-11-13 ENCOUNTER — OFFICE VISIT (OUTPATIENT)
Dept: FAMILY MEDICINE CLINIC | Age: 74
End: 2019-11-13

## 2019-11-13 ENCOUNTER — HOSPITAL ENCOUNTER (OUTPATIENT)
Dept: LAB | Age: 74
Discharge: HOME | End: 2019-11-13

## 2019-11-13 VITALS
RESPIRATION RATE: 18 BRPM | DIASTOLIC BLOOD PRESSURE: 76 MMHG | WEIGHT: 241 LBS | HEART RATE: 60 BPM | SYSTOLIC BLOOD PRESSURE: 161 MMHG | HEIGHT: 64 IN | TEMPERATURE: 97.9 F | BODY MASS INDEX: 41.15 KG/M2

## 2019-11-13 DIAGNOSIS — E78.00 ELEVATED CHOLESTEROL: ICD-10-CM

## 2019-11-13 DIAGNOSIS — I10 ESSENTIAL HYPERTENSION: Primary | ICD-10-CM

## 2019-11-13 DIAGNOSIS — I10 ESSENTIAL HYPERTENSION: ICD-10-CM

## 2019-11-13 DIAGNOSIS — R05.3 CHRONIC COUGH: ICD-10-CM

## 2019-11-13 DIAGNOSIS — E11.21 DIABETIC NEPHROPATHY ASSOCIATED WITH TYPE 2 DIABETES MELLITUS (HCC): ICD-10-CM

## 2019-11-13 DIAGNOSIS — E11.8 DM (DIABETES MELLITUS), TYPE 2 WITH COMPLICATIONS (HCC): ICD-10-CM

## 2019-11-13 PROBLEM — H40.89 OTHER SPECIFIED GLAUCOMA: Status: ACTIVE | Noted: 2019-11-13

## 2019-11-13 LAB
ANION GAP SERPL CALC-SCNC: 8 MMOL/L (ref 5–15)
BUN SERPL-MCNC: 18 MG/DL (ref 6–20)
BUN/CREAT SERPL: 17 (ref 12–20)
CALCIUM SERPL-MCNC: 9.1 MG/DL (ref 8.5–10.1)
CHLORIDE SERPL-SCNC: 96 MMOL/L (ref 97–108)
CO2 SERPL-SCNC: 28 MMOL/L (ref 21–32)
CREAT SERPL-MCNC: 1.05 MG/DL (ref 0.55–1.02)
CREAT UR-MCNC: 91.1 MG/DL
EST. AVERAGE GLUCOSE BLD GHB EST-MCNC: 180 MG/DL
GLUCOSE SERPL-MCNC: 85 MG/DL (ref 65–100)
HBA1C MFR BLD: 7.9 % (ref 4.2–6.3)
MICROALBUMIN UR-MCNC: 41.5 MG/DL
MICROALBUMIN/CREAT UR-RTO: 456 MG/G (ref 0–30)
POTASSIUM SERPL-SCNC: 4.2 MMOL/L (ref 3.5–5.1)
SODIUM SERPL-SCNC: 132 MMOL/L (ref 136–145)

## 2019-11-13 NOTE — PROGRESS NOTES
Chief Complaint   Patient presents with    Hypertension     f/u     1. Have you been to the ER, urgent care clinic since your last visit? Hospitalized since your last visit? No     2. Have you seen or consulted any other health care providers outside of the [address] [place] [address] since your last visit? Include any pap smears or colon screening.  No

## 2019-11-13 NOTE — PROGRESS NOTES
HISTORY OF PRESENT ILLNESS  Kaur Bullock is a 69 y.o. female. Kaur Bullock is here to follow up on their HTN. This is a chronic problem. The problem occurs constantly and is worse. The symptoms are relieved by losartan, hydrochlorothiazide, Norvasc, Toprol XL, which is/are working moderately. She is seeing renal for her HTN and she has follow up scheduled in January. She also needs follow up on her diabetes. Also, I have seen her for a chronic cough in the past and it has not gone away. Workup, thus far, has been unremarkable. Review of Systems   Constitutional: Negative for weight loss. No weight gain   Eyes: Positive for blurred vision. Respiratory: Negative for shortness of breath. Cardiovascular: Positive for leg swelling. Negative for chest pain. Gastrointestinal: Negative for abdominal pain. Genitourinary:        No polyuria   Neurological: Negative for dizziness, sensory change, speech change and focal weakness. Endo/Heme/Allergies: Negative for polydipsia. Visit Vitals  /76   Pulse 60   Temp 97.9 °F (36.6 °C) (Oral)   Resp 18   Ht 5' 4\" (1.626 m)   Wt 241 lb (109.3 kg)   BMI 41.37 kg/m²     BP Readings from Last 3 Encounters:   11/07/19 134/70   09/13/19 169/81   08/30/19 170/75     Physical Exam   Constitutional: She is oriented to person, place, and time. She appears well-developed and well-nourished. No distress. Cardiovascular: Normal rate, regular rhythm and normal heart sounds. Exam reveals no gallop and no friction rub. No murmur heard. Pulmonary/Chest: Effort normal and breath sounds normal. No respiratory distress. She has no wheezes. She has no rales. Musculoskeletal: She exhibits no edema. Neurological: She is alert and oriented to person, place, and time. Skin: Skin is warm and dry. She is not diaphoretic. Nursing note and vitals reviewed. ASSESSMENT and PLAN    ICD-10-CM ICD-9-CM    1.  Essential hypertension I10 401.9 METABOLIC PANEL, BASIC   2. DM (diabetes mellitus), type 2 with complications (HCC) E01.1 250.90 HEMOGLOBIN A1C WITH EAG      MICROALBUMIN, UR, RAND W/ MICROALB/CREAT RATIO   3. Diabetic nephropathy associated with type 2 diabetes mellitus (HCC) E11.21 250.40 HEMOGLOBIN A1C WITH EAG     583.81 MICROALBUMIN, UR, RAND W/ MICROALB/CREAT RATIO      METABOLIC PANEL, BASIC   4. Chronic cough R05 786.2 REFERRAL TO PULMONARY DISEASE   5. Elevated cholesterol E78.00 272.0 ubiquinol-V50-EF-yfxpnmmdjhc 200-5-0.8-400 mg cap        Blood pressure elevated, renal following  Overdue for A1C  Unresolved cough  Continue to follow up with renal  Labs per orders. Pulmonary referral    Follow-up and Dispositions    · Return in about 4 months (around 3/13/2020) for diabetes. Reviewed plan of care. Patient has provided input and agrees with goals.

## 2019-11-14 ENCOUNTER — TELEPHONE (OUTPATIENT)
Dept: FAMILY MEDICINE CLINIC | Age: 74
End: 2019-11-14

## 2019-11-14 NOTE — TELEPHONE ENCOUNTER
Ms Loyd Brennon called to let Dr Olamide Rothman know that she is taking tolterodine 2mg twice per day and also that she has an appointment with Dr Won Knox on 11/19/19. Records have been faxed.

## 2019-11-15 DIAGNOSIS — M25.552 PAIN OF BOTH HIP JOINTS: ICD-10-CM

## 2019-11-15 DIAGNOSIS — M79.605 PAIN IN BOTH LOWER EXTREMITIES: ICD-10-CM

## 2019-11-15 DIAGNOSIS — M25.561 CHRONIC PAIN OF BOTH KNEES: ICD-10-CM

## 2019-11-15 DIAGNOSIS — M25.562 CHRONIC PAIN OF BOTH KNEES: ICD-10-CM

## 2019-11-15 DIAGNOSIS — G89.29 CHRONIC PAIN OF BOTH KNEES: ICD-10-CM

## 2019-11-15 DIAGNOSIS — M25.551 PAIN OF BOTH HIP JOINTS: ICD-10-CM

## 2019-11-15 DIAGNOSIS — M79.604 PAIN IN BOTH LOWER EXTREMITIES: ICD-10-CM

## 2019-11-17 RX ORDER — IBUPROFEN 600 MG/1
600 TABLET ORAL
Qty: 40 TAB | Refills: 1 | Status: SHIPPED | OUTPATIENT
Start: 2019-11-17

## 2019-11-18 ENCOUNTER — HOSPITAL ENCOUNTER (OUTPATIENT)
Dept: GENERAL RADIOLOGY | Age: 74
Discharge: HOME | End: 2019-11-18
Payer: MEDICARE

## 2019-11-18 DIAGNOSIS — R06.02 SHORTNESS OF BREATH: ICD-10-CM

## 2019-11-18 PROCEDURE — 71046 X-RAY EXAM CHEST 2 VIEWS: CPT

## 2019-11-21 RX ORDER — LANCETS 33 GAUGE
EACH MISCELLANEOUS
Qty: 100 LANCET | Refills: 4 | Status: SHIPPED | OUTPATIENT
Start: 2019-11-21

## 2020-01-23 RX ORDER — HYDROCHLOROTHIAZIDE 25 MG/1
TABLET ORAL
Qty: 90 TAB | Refills: 0 | Status: SHIPPED | OUTPATIENT
Start: 2020-01-23 | End: 2020-05-14 | Stop reason: SDUPTHER

## 2020-01-26 DIAGNOSIS — E11.21 TYPE 2 DIABETES WITH NEPHROPATHY (HCC): ICD-10-CM

## 2020-01-26 RX ORDER — GLIPIZIDE 10 MG/1
TABLET ORAL
Qty: 180 TAB | Refills: 3 | Status: SHIPPED | OUTPATIENT
Start: 2020-01-26 | End: 2020-05-14 | Stop reason: SDUPTHER

## 2020-03-02 DIAGNOSIS — M54.81 OCCIPITAL NEURALGIA OF RIGHT SIDE: ICD-10-CM

## 2020-03-02 DIAGNOSIS — G50.0 SUPRAORBITAL NEURALGIA: ICD-10-CM

## 2020-03-02 RX ORDER — OXCARBAZEPINE 300 MG/1
TABLET, FILM COATED ORAL
Qty: 90 TAB | Refills: 1 | Status: SHIPPED | OUTPATIENT
Start: 2020-03-02 | End: 2020-05-04

## 2020-05-02 DIAGNOSIS — R05.3 CHRONIC COUGH: ICD-10-CM

## 2020-05-02 DIAGNOSIS — G50.0 SUPRAORBITAL NEURALGIA: ICD-10-CM

## 2020-05-02 DIAGNOSIS — M54.81 OCCIPITAL NEURALGIA OF RIGHT SIDE: ICD-10-CM

## 2020-05-04 RX ORDER — OXCARBAZEPINE 300 MG/1
TABLET, FILM COATED ORAL
Qty: 90 TAB | Refills: 0 | Status: SHIPPED | OUTPATIENT
Start: 2020-05-04 | End: 2020-06-01

## 2020-05-04 RX ORDER — OMEPRAZOLE 20 MG/1
CAPSULE, DELAYED RELEASE ORAL
Qty: 90 CAP | Refills: 1 | Status: SHIPPED | OUTPATIENT
Start: 2020-05-04 | End: 2020-12-28

## 2020-05-14 DIAGNOSIS — E78.00 ELEVATED CHOLESTEROL: ICD-10-CM

## 2020-05-14 DIAGNOSIS — G45.2 MULTIPLE AND BILATERAL PRECEREBRAL ARTERY SYNDROMES: ICD-10-CM

## 2020-05-14 DIAGNOSIS — I10 ESSENTIAL HYPERTENSION: ICD-10-CM

## 2020-05-14 DIAGNOSIS — E11.21 TYPE 2 DIABETES WITH NEPHROPATHY (HCC): ICD-10-CM

## 2020-05-14 DIAGNOSIS — R05.3 CHRONIC COUGH: ICD-10-CM

## 2020-05-14 NOTE — TELEPHONE ENCOUNTER
Pt called stating she's out of all of her medications, is now working from home, and noticed swelling in her feet.      Pt requesting refills on her medications be sent to [address] [place] [address] and scheduled virtual visit with Dr. Teddy Tracey for Monday, 5/18/20 at 10:30 am.  Agustín

## 2020-05-15 RX ORDER — AMLODIPINE BESYLATE 10 MG/1
TABLET ORAL
Qty: 30 TAB | Refills: 0 | Status: SHIPPED | OUTPATIENT
Start: 2020-05-15 | End: 2020-11-04 | Stop reason: SDUPTHER

## 2020-05-15 RX ORDER — ROSUVASTATIN CALCIUM 5 MG/1
5 TABLET, COATED ORAL
Qty: 30 TAB | Refills: 3 | Status: SHIPPED | OUTPATIENT
Start: 2020-05-15 | End: 2020-11-04 | Stop reason: SDUPTHER

## 2020-05-15 RX ORDER — LOSARTAN POTASSIUM 100 MG/1
TABLET ORAL
Qty: 90 TAB | Refills: 0 | Status: SHIPPED | OUTPATIENT
Start: 2020-05-15 | End: 2020-08-11

## 2020-05-15 RX ORDER — HYDROCHLOROTHIAZIDE 25 MG/1
TABLET ORAL
Qty: 90 TAB | Refills: 0 | Status: SHIPPED | OUTPATIENT
Start: 2020-05-15 | End: 2020-11-04 | Stop reason: SDUPTHER

## 2020-05-15 RX ORDER — GLIPIZIDE 10 MG/1
TABLET ORAL
Qty: 180 TAB | Refills: 3 | Status: SHIPPED | OUTPATIENT
Start: 2020-05-15 | End: 2020-11-04 | Stop reason: SDUPTHER

## 2020-05-15 RX ORDER — ASPIRIN AND DIPYRIDAMOLE 25; 200 MG/1; MG/1
CAPSULE, EXTENDED RELEASE ORAL
Qty: 60 CAP | Refills: 3 | Status: SHIPPED | OUTPATIENT
Start: 2020-05-15 | End: 2020-10-28 | Stop reason: SDUPTHER

## 2020-05-15 RX ORDER — MOMETASONE FUROATE 50 UG/1
2 SPRAY, METERED NASAL DAILY
Qty: 1 CONTAINER | Refills: 5 | Status: SHIPPED | OUTPATIENT
Start: 2020-05-15

## 2020-05-15 RX ORDER — SITAGLIPTIN AND METFORMIN HYDROCHLORIDE 100; 1000 MG/1; MG/1
TABLET, FILM COATED, EXTENDED RELEASE ORAL
Qty: 30 TAB | Refills: 5 | Status: SHIPPED | OUTPATIENT
Start: 2020-05-15 | End: 2020-11-04 | Stop reason: SDUPTHER

## 2020-05-15 RX ORDER — METOPROLOL SUCCINATE 200 MG/1
TABLET, EXTENDED RELEASE ORAL
Qty: 90 TAB | Refills: 0 | Status: SHIPPED | OUTPATIENT
Start: 2020-05-15 | End: 2020-08-20

## 2020-05-18 ENCOUNTER — VIRTUAL VISIT (OUTPATIENT)
Dept: FAMILY MEDICINE CLINIC | Age: 75
End: 2020-05-18

## 2020-05-18 VITALS — HEIGHT: 64 IN | WEIGHT: 241 LBS | BODY MASS INDEX: 41.15 KG/M2 | RESPIRATION RATE: 16 BRPM

## 2020-05-18 DIAGNOSIS — F41.9 ANXIETY: ICD-10-CM

## 2020-05-18 DIAGNOSIS — E78.00 ELEVATED CHOLESTEROL: ICD-10-CM

## 2020-05-18 DIAGNOSIS — E11.21 DIABETIC NEPHROPATHY ASSOCIATED WITH TYPE 2 DIABETES MELLITUS (HCC): ICD-10-CM

## 2020-05-18 DIAGNOSIS — E11.8 DM (DIABETES MELLITUS), TYPE 2 WITH COMPLICATIONS (HCC): ICD-10-CM

## 2020-05-18 DIAGNOSIS — I10 ESSENTIAL HYPERTENSION: Primary | ICD-10-CM

## 2020-05-18 RX ORDER — LATANOPROST 50 UG/ML
SOLUTION/ DROPS OPHTHALMIC
COMMUNITY
Start: 2020-04-23

## 2020-05-18 RX ORDER — SERTRALINE HYDROCHLORIDE 50 MG/1
50 TABLET, FILM COATED ORAL DAILY
Qty: 30 TAB | Refills: 0 | Status: SHIPPED | OUTPATIENT
Start: 2020-05-18 | End: 2020-06-16

## 2020-05-18 NOTE — PROGRESS NOTES
Chief Complaint   Patient presents with    Hypertension    Cholesterol Problem     1. Have you been to the ER, urgent care clinic since your last visit? Hospitalized since your last visit? No    2. Have you seen or consulted any other health care providers outside of the [address] [place] [address] since your last visit? Include any pap smears or colon screening.  No    Patient is completing visit from home in [address], [place].

## 2020-05-18 NOTE — PROGRESS NOTES
Gretchen Tamayo is a 69 y.o. female who was seen by synchronous (real-time) audio-video technology on 5/18/2020. Assessment & Plan:   Diagnoses and all orders for this visit:    1. Essential hypertension  -     METABOLIC PANEL, BASIC; Future    2. DM (diabetes mellitus), type 2 with complications (HCC)  -     METABOLIC PANEL, BASIC; Future  -     HEMOGLOBIN A1C WITH EAG; Future    3. Diabetic nephropathy associated with type 2 diabetes mellitus ([facility] [address]  -     METABOLIC PANEL, BASIC; Future  -     HEMOGLOBIN A1C WITH EAG; Future    4. Elevated cholesterol  -     LIPID PANEL; Future  -     HEPATIC FUNCTION PANEL; Future    5. Anxiety  -     sertraline (Zoloft) 50 mg tablet; Take 1 Tab by mouth daily. Unknown BP  Needs labs  Anxiety symptomatic  Labs per orders. Zoloft  She is going to find her BP machine so I know what her BP is at her next visit      Follow-up and Dispositions    · Return in about 3 weeks (around 6/8/2020) for anxiety, blood pressure. Reviewed plan of care. Patient has provided input and agrees with goals. CPT Codes 93031-12070 for Established Patients may apply to this Telehealth Visit      Subjective:   Gretchen Tamayo was seen for Hypertension (Follow up) and Cholesterol Problem      Gretchen Tamayo is here to follow up on their HTN. This is a chronic problem. The problem occurs constantly and is ? Isac Lyons The symptoms are relieved by losartan, Toprol, Norvasc, which is/are working ? Isac Lyons She also needs follow up on her diabetes and elevated lipids. Her anxiety is worse and she requests \"something for my nerves\". Review of Systems   Constitutional: Positive for malaise/fatigue. Negative for weight loss. No weight gain   Eyes: Negative for blurred vision. Respiratory: Negative for shortness of breath. Cardiovascular: Positive for leg swelling. Negative for chest pain and palpitations. Gastrointestinal: Negative for abdominal pain. Genitourinary:        No polyuria   Neurological: Positive for headaches. Negative for dizziness, sensory change, speech change and focal weakness. Endo/Heme/Allergies: Negative for polydipsia. Psychiatric/Behavioral: Negative for depression, hallucinations, substance abuse and suicidal ideas. The patient is nervous/anxious and has insomnia. Objective:     Visit Vitals  Resp 16   Ht 5' 4\" (1.626 m)   Wt 241 lb (109.3 kg)   BMI 41.37 kg/m²     BP Readings from Last 3 Encounters:   11/13/19 161/76   11/07/19 134/70   09/13/19 169/81       Physical Exam  Constitutional:       General: She is not in acute distress. Appearance: Normal appearance. She is not diaphoretic. Neurological:      Mental Status: She is alert and oriented to person, place, and time. Motor: No tremor. Psychiatric:         Mood and Affect: Mood normal.         Behavior: Behavior normal.         Thought Content: Thought content normal.         Judgment: Judgment normal.         Due to this being a TeleHealth evaluation, many elements of the physical examination are unable to be assessed. We discussed the expected course, resolution and complications of the diagnosis(es) in detail. Medication risks, benefits, costs, interactions, and alternatives were discussed as indicated. I advised her to contact the office if her condition worsens, changes or fails to improve as anticipated. She expressed understanding with the diagnosis(es) and plan. Pursuant to the emergency declaration under the [facility], [facility] waiver authority and the [employer] Act, this Virtual  Visit was conducted, with patient's consent, to reduce the patient's risk of exposure to COVID-19 and provide continuity of care for an established patient.      Services were provided through a video synchronous discussion virtually to substitute for in-person clinic visit.     Maryellen Coto MD

## 2020-06-14 DIAGNOSIS — F41.9 ANXIETY: ICD-10-CM

## 2020-06-16 ENCOUNTER — VIRTUAL VISIT (OUTPATIENT)
Dept: FAMILY MEDICINE CLINIC | Age: 75
End: 2020-06-16

## 2020-06-16 DIAGNOSIS — I10 BENIGN ESSENTIAL HTN: ICD-10-CM

## 2020-06-16 DIAGNOSIS — F32.1 DEPRESSION, MAJOR, SINGLE EPISODE, MODERATE (HCC): Primary | ICD-10-CM

## 2020-06-16 DIAGNOSIS — F41.9 ANXIETY: ICD-10-CM

## 2020-06-16 RX ORDER — SERTRALINE HYDROCHLORIDE 50 MG/1
TABLET, FILM COATED ORAL
Qty: 90 TAB | Refills: 3 | Status: SHIPPED | OUTPATIENT
Start: 2020-06-16 | End: 2020-11-04 | Stop reason: SDUPTHER

## 2020-06-16 RX ORDER — MIRABEGRON 25 MG/1
TABLET, FILM COATED, EXTENDED RELEASE ORAL
COMMUNITY
Start: 2020-06-05

## 2020-06-16 NOTE — PROGRESS NOTES
Chief Complaint   Patient presents with    Medication Evaluation     1. Have you been to the ER, urgent care clinic since your last visit? Hospitalized since your last visit? No    2. Have you seen or consulted any other health care providers outside of the [address] [place] [address] since your last visit? Include any pap smears or colon screening.  No    Patient is completing visit from [place], [place].

## 2020-06-16 NOTE — PROGRESS NOTES
Linda Matias is a 69 y.o. female who was seen by synchronous (real-time) audio-video technology on 6/16/2020. Assessment & Plan:   Diagnoses and all orders for this visit:    1. Depression, major, single episode, moderate ([address]    2. Anxiety    3. Benign essential HTN        Depression and anxiety need improvement  Unknown blood pressure  Start Zoloft  She will call me with her blood pressure later this week    Follow-up and Dispositions    · Return in about 3 weeks (around 7/7/2020) for depression, anxiety. Reviewed plan of care. Patient has provided input and agrees with goals. CPT Codes 05294-31320 for Established Patients may apply to this Telehealth Visit      Subjective:   Linda Matias was seen for Medication Evaluation      Linda Matias is here to follow up on their depression and anxiety. He/she is worse. Currently, they are taking nothing. Also, she needs follow up on her HTN. She never called me with her BP. Review of Systems   Constitutional: Negative for malaise/fatigue and weight loss. No weight gain   Respiratory: Negative for shortness of breath. Cardiovascular: Negative for chest pain and palpitations. Psychiatric/Behavioral: Positive for depression. Negative for hallucinations, substance abuse and suicidal ideas. The patient is nervous/anxious. The patient does not have insomnia. Objective:     Physical Exam  Constitutional:       General: She is not in acute distress. Appearance: Normal appearance. Neurological:      Mental Status: She is alert and oriented to person, place, and time. Motor: No tremor. Psychiatric:         Mood and Affect: Mood normal.         Behavior: Behavior normal.         Thought Content: Thought content normal.         Judgment: Judgment normal.         Due to this being a TeleHealth evaluation, many elements of the physical examination are unable to be assessed.          We discussed the expected course, resolution and complications of the diagnosis(es) in detail. Medication risks, benefits, costs, interactions, and alternatives were discussed as indicated. I advised her to contact the office if her condition worsens, changes or fails to improve as anticipated. She expressed understanding with the diagnosis(es) and plan. Pursuant to the emergency declaration under the [address] [place] [address], [facility] waiver authority and the [employer] and [employer] Act, this Virtual  Visit was conducted, with patient's consent, to reduce the patient's risk of exposure to COVID-19 and provide continuity of care for an established patient. Services were provided through a video synchronous discussion virtually to substitute for in-person clinic visit.     Lora Savage MD

## 2020-08-10 NOTE — LETTER
8/13/2020 8:59 AM 
 
Ms. Vivar Blazing [address] [facility] Dear Ms. Caleb Montesinos missed you! Please call our office at [phone] and schedule a blood pressure follow up appointment for your continued care, we are also offering virtual appointment as well. I will be unable to continue refilling your medication until you have been seen for an appointment. Look forward to seeing you soon.   
 
 
 
Sincerely, 
 
 
Nika Tellez MD

## 2020-08-11 RX ORDER — LOSARTAN POTASSIUM 100 MG/1
TABLET ORAL
Qty: 90 TAB | Refills: 0 | Status: SHIPPED | OUTPATIENT
Start: 2020-08-11 | End: 2020-11-04 | Stop reason: SDUPTHER

## 2020-08-20 RX ORDER — METOPROLOL SUCCINATE 200 MG/1
TABLET, EXTENDED RELEASE ORAL
Qty: 90 TAB | Refills: 0 | Status: SHIPPED | OUTPATIENT
Start: 2020-08-20 | End: 2020-11-04 | Stop reason: SDUPTHER

## 2020-10-16 LAB — HBA1C MFR BLD HPLC: 9.9 %

## 2020-10-28 DIAGNOSIS — G45.2 MULTIPLE AND BILATERAL PRECEREBRAL ARTERY SYNDROMES: ICD-10-CM

## 2020-10-28 NOTE — TELEPHONE ENCOUNTER
Pt requesting refill for Aggrenox be sent to [address] [place] [address] on [address] [facility]. Pt has appointment with Dr. Kashmir Ott next week on 11/4/20 at 8 am but has been having back pain for over a month.  [place]and[place]erm

## 2020-10-29 RX ORDER — ASPIRIN AND DIPYRIDAMOLE 25; 200 MG/1; MG/1
CAPSULE, EXTENDED RELEASE ORAL
Qty: 180 CAP | Refills: 1 | Status: SHIPPED | OUTPATIENT
Start: 2020-10-29 | End: 2020-11-04 | Stop reason: SDUPTHER

## 2020-11-03 LAB — HBA1C MFR BLD HPLC: 9.9 %

## 2020-11-04 ENCOUNTER — TRANSCRIBE ORDER (OUTPATIENT)
Dept: REGISTRATION | Age: 75
End: 2020-11-04

## 2020-11-04 ENCOUNTER — OFFICE VISIT (OUTPATIENT)
Dept: FAMILY MEDICINE CLINIC | Age: 75
End: 2020-11-04
Payer: MEDICARE

## 2020-11-04 ENCOUNTER — HOSPITAL ENCOUNTER (OUTPATIENT)
Dept: GENERAL RADIOLOGY | Age: 75
Discharge: HOME | End: 2020-11-04
Payer: MEDICARE

## 2020-11-04 VITALS
DIASTOLIC BLOOD PRESSURE: 110 MMHG | HEART RATE: 59 BPM | BODY MASS INDEX: 40.29 KG/M2 | SYSTOLIC BLOOD PRESSURE: 180 MMHG | HEIGHT: 64 IN | TEMPERATURE: 97 F | RESPIRATION RATE: 20 BRPM | WEIGHT: 236 LBS

## 2020-11-04 DIAGNOSIS — Z13.39 SCREENING FOR ALCOHOLISM: ICD-10-CM

## 2020-11-04 DIAGNOSIS — E11.21 TYPE 2 DIABETES WITH NEPHROPATHY (HCC): ICD-10-CM

## 2020-11-04 DIAGNOSIS — M54.6 CHRONIC BILATERAL THORACIC BACK PAIN: ICD-10-CM

## 2020-11-04 DIAGNOSIS — I10 ESSENTIAL HYPERTENSION: Primary | ICD-10-CM

## 2020-11-04 DIAGNOSIS — F41.9 ANXIETY: ICD-10-CM

## 2020-11-04 DIAGNOSIS — Z00.00 MEDICARE ANNUAL WELLNESS VISIT, SUBSEQUENT: ICD-10-CM

## 2020-11-04 DIAGNOSIS — Z23 ENCOUNTER FOR IMMUNIZATION: ICD-10-CM

## 2020-11-04 DIAGNOSIS — G45.2 MULTIPLE AND BILATERAL PRECEREBRAL ARTERY SYNDROMES: ICD-10-CM

## 2020-11-04 DIAGNOSIS — E78.00 ELEVATED CHOLESTEROL: ICD-10-CM

## 2020-11-04 DIAGNOSIS — M54.6 THORACIC BACK PAIN: Primary | ICD-10-CM

## 2020-11-04 DIAGNOSIS — M54.6 THORACIC BACK PAIN: ICD-10-CM

## 2020-11-04 DIAGNOSIS — G89.29 CHRONIC BILATERAL THORACIC BACK PAIN: ICD-10-CM

## 2020-11-04 DIAGNOSIS — I10 ESSENTIAL HYPERTENSION: ICD-10-CM

## 2020-11-04 DIAGNOSIS — E66.01 SEVERE OBESITY WITH BODY MASS INDEX (BMI) OF 35.0 TO 39.9 WITH SERIOUS COMORBIDITY (HCC): ICD-10-CM

## 2020-11-04 DIAGNOSIS — Z13.820 OSTEOPOROSIS SCREENING: ICD-10-CM

## 2020-11-04 DIAGNOSIS — M54.6 PAIN IN THORACIC SPINE: Primary | ICD-10-CM

## 2020-11-04 DIAGNOSIS — M54.6 PAIN IN THORACIC SPINE: ICD-10-CM

## 2020-11-04 LAB
ALBUMIN SERPL-MCNC: 4.2 G/DL (ref 3.5–5)
ALBUMIN/GLOB SERPL: 1 {RATIO} (ref 1.1–2.2)
ALP SERPL-CCNC: 116 U/L (ref 45–117)
ALT SERPL-CCNC: 26 U/L (ref 12–78)
ANION GAP SERPL CALC-SCNC: 7 MMOL/L (ref 5–15)
AST SERPL-CCNC: 23 U/L (ref 15–37)
BILIRUB DIRECT SERPL-MCNC: 0.1 MG/DL (ref 0–0.2)
BILIRUB SERPL-MCNC: 0.3 MG/DL (ref 0.2–1)
BUN SERPL-MCNC: 11 MG/DL (ref 6–20)
BUN/CREAT SERPL: 11 (ref 12–20)
CALCIUM SERPL-MCNC: 9.4 MG/DL (ref 8.5–10.1)
CHLORIDE SERPL-SCNC: 99 MMOL/L (ref 97–108)
CHOLEST SERPL-MCNC: 452 MG/DL
CO2 SERPL-SCNC: 30 MMOL/L (ref 21–32)
CREAT SERPL-MCNC: 0.97 MG/DL (ref 0.55–1.02)
EST. AVERAGE GLUCOSE BLD GHB EST-MCNC: 171 MG/DL
GLOBULIN SER CALC-MCNC: 4.2 G/DL (ref 2–4)
GLUCOSE SERPL-MCNC: 112 MG/DL (ref 65–100)
HBA1C MFR BLD: 7.6 % (ref 4–5.6)
HDLC SERPL-MCNC: 58 MG/DL
HDLC SERPL: 7.8 {RATIO} (ref 0–5)
LDLC SERPL CALC-MCNC: 342.2 MG/DL (ref 0–100)
LIPID PROFILE,FLP: ABNORMAL
POTASSIUM SERPL-SCNC: 3.8 MMOL/L (ref 3.5–5.1)
PROT SERPL-MCNC: 8.4 G/DL (ref 6.4–8.2)
SODIUM SERPL-SCNC: 136 MMOL/L (ref 136–145)
TRIGL SERPL-MCNC: 259 MG/DL (ref ?–150)
VLDLC SERPL CALC-MCNC: 51.8 MG/DL

## 2020-11-04 PROCEDURE — 2022F DILAT RTA XM EVC RTNOPTHY: CPT | Performed by: FAMILY MEDICINE

## 2020-11-04 PROCEDURE — G8427 DOCREV CUR MEDS BY ELIG CLIN: HCPCS | Performed by: FAMILY MEDICINE

## 2020-11-04 PROCEDURE — G8536 NO DOC ELDER MAL SCRN: HCPCS | Performed by: FAMILY MEDICINE

## 2020-11-04 PROCEDURE — 3046F HEMOGLOBIN A1C LEVEL >9.0%: CPT | Performed by: FAMILY MEDICINE

## 2020-11-04 PROCEDURE — 3017F COLORECTAL CA SCREEN DOC REV: CPT | Performed by: FAMILY MEDICINE

## 2020-11-04 PROCEDURE — 99214 OFFICE O/P EST MOD 30 MIN: CPT | Performed by: FAMILY MEDICINE

## 2020-11-04 PROCEDURE — G0439 PPPS, SUBSEQ VISIT: HCPCS | Performed by: FAMILY MEDICINE

## 2020-11-04 PROCEDURE — 72072 X-RAY EXAM THORAC SPINE 3VWS: CPT

## 2020-11-04 PROCEDURE — G8399 PT W/DXA RESULTS DOCUMENT: HCPCS | Performed by: FAMILY MEDICINE

## 2020-11-04 PROCEDURE — G8755 DIAS BP > OR = 90: HCPCS | Performed by: FAMILY MEDICINE

## 2020-11-04 PROCEDURE — 1090F PRES/ABSN URINE INCON ASSESS: CPT | Performed by: FAMILY MEDICINE

## 2020-11-04 PROCEDURE — G9717 DOC PT DX DEP/BP F/U NT REQ: HCPCS | Performed by: FAMILY MEDICINE

## 2020-11-04 PROCEDURE — G8417 CALC BMI ABV UP PARAM F/U: HCPCS | Performed by: FAMILY MEDICINE

## 2020-11-04 PROCEDURE — G9899 SCRN MAM PERF RSLTS DOC: HCPCS | Performed by: FAMILY MEDICINE

## 2020-11-04 PROCEDURE — 1101F PT FALLS ASSESS-DOCD LE1/YR: CPT | Performed by: FAMILY MEDICINE

## 2020-11-04 PROCEDURE — 72100 X-RAY EXAM L-S SPINE 2/3 VWS: CPT

## 2020-11-04 PROCEDURE — 90694 VACC AIIV4 NO PRSRV 0.5ML IM: CPT

## 2020-11-04 PROCEDURE — G8753 SYS BP > OR = 140: HCPCS | Performed by: FAMILY MEDICINE

## 2020-11-04 PROCEDURE — G0008 ADMIN INFLUENZA VIRUS VAC: HCPCS

## 2020-11-04 RX ORDER — AMLODIPINE BESYLATE 10 MG/1
TABLET ORAL
Qty: 90 TAB | Refills: 0 | Status: SHIPPED | OUTPATIENT
Start: 2020-11-04

## 2020-11-04 RX ORDER — OXCARBAZEPINE 300 MG/1
TABLET, FILM COATED ORAL
Qty: 90 TAB | Refills: 5 | Status: CANCELLED | OUTPATIENT
Start: 2020-11-04

## 2020-11-04 RX ORDER — GLIPIZIDE 10 MG/1
TABLET ORAL
Qty: 180 TAB | Refills: 0 | Status: SHIPPED | OUTPATIENT
Start: 2020-11-04

## 2020-11-04 RX ORDER — HYDROCHLOROTHIAZIDE 25 MG/1
TABLET ORAL
Qty: 90 TAB | Refills: 0 | Status: SHIPPED | OUTPATIENT
Start: 2020-11-04 | End: 2020-11-06

## 2020-11-04 RX ORDER — ROSUVASTATIN CALCIUM 5 MG/1
5 TABLET, COATED ORAL
Qty: 90 TAB | Refills: 0 | Status: SHIPPED | OUTPATIENT
Start: 2020-11-04 | End: 2020-11-17

## 2020-11-04 RX ORDER — SITAGLIPTIN AND METFORMIN HYDROCHLORIDE 100; 1000 MG/1; MG/1
TABLET, FILM COATED, EXTENDED RELEASE ORAL
Qty: 90 TAB | Refills: 5 | Status: SHIPPED | OUTPATIENT
Start: 2020-11-04 | End: 2020-11-18

## 2020-11-04 RX ORDER — METOPROLOL SUCCINATE 200 MG/1
200 TABLET, EXTENDED RELEASE ORAL DAILY
Qty: 90 TAB | Refills: 0 | Status: SHIPPED | OUTPATIENT
Start: 2020-11-04

## 2020-11-04 RX ORDER — ASPIRIN AND DIPYRIDAMOLE 25; 200 MG/1; MG/1
CAPSULE, EXTENDED RELEASE ORAL
Qty: 180 CAP | Refills: 4 | Status: SHIPPED | OUTPATIENT
Start: 2020-11-04

## 2020-11-04 RX ORDER — SERTRALINE HYDROCHLORIDE 50 MG/1
TABLET, FILM COATED ORAL
Qty: 90 TAB | Refills: 4 | Status: SHIPPED | OUTPATIENT
Start: 2020-11-04

## 2020-11-04 RX ORDER — TIZANIDINE 2 MG/1
2 TABLET ORAL
Qty: 30 TAB | Refills: 2 | Status: SHIPPED | OUTPATIENT
Start: 2020-11-04

## 2020-11-04 RX ORDER — LOSARTAN POTASSIUM 100 MG/1
TABLET ORAL
Qty: 90 TAB | Refills: 0 | Status: SHIPPED | OUTPATIENT
Start: 2020-11-04 | End: 2020-11-06

## 2020-11-04 NOTE — PROGRESS NOTES
HISTORY OF PRESENT ILLNESS  Santi Echevarria is a 70 y.o. female. Santi Echevarria is here to follow up on their HTN. This is a chronic problem. The problem occurs constantly and is worse. The symptoms are relieved by Toprol XL, losartan, Norvasc, hydrochlorothiazide, which is/are working barely. She is out of Norvasc. She has been having thoracic back pain for over a month. It is getting worse. No history of injury. It does not radiate. Movement makes it worse. Advil and Tylenol help a little. She has been alternating these. Also, she is due for follow up on her diabetes and elevated lipids. Review of Systems   Constitutional: Positive for weight loss. No weight gain   Eyes: Negative for blurred vision. Respiratory: Negative for shortness of breath. Cardiovascular: Negative for chest pain and leg swelling. Gastrointestinal: Negative for abdominal pain. Musculoskeletal: Positive for back pain. Neurological: Positive for headaches. Negative for dizziness, sensory change, speech change, focal weakness and weakness. Visit Vitals  BP (!) 180/110 Comment: left arm, manual cuff   Pulse (!) 59   Temp 97 °F (36.1 °C) (Temporal)   Resp 20   Ht 5' 4\" (1.626 m)   Wt 236 lb (107 kg)   BMI 40.51 kg/m²     BP Readings from Last 3 Encounters:   11/04/20 (!) 218/100   11/13/19 161/76   11/07/19 134/70       Physical Exam  Vitals signs and nursing note reviewed. Constitutional:       General: She is not in acute distress. Appearance: She is well-developed. She is not diaphoretic. Cardiovascular:      Rate and Rhythm: Normal rate and regular rhythm. Heart sounds: Normal heart sounds. No murmur. No friction rub. No gallop. Pulmonary:      Effort: Pulmonary effort is normal. No respiratory distress. Breath sounds: Normal breath sounds. No wheezing or rales. Musculoskeletal:      Thoracic back: She exhibits tenderness, bony tenderness and pain. She exhibits no spasm. Comments: Low T spine and lower thoracic  with increased turgor   Skin:     General: Skin is warm and dry. Comments: Feet in good condition. Onychomycosis. Neurological:      Mental Status: She is alert and oriented to person, place, and time. Comments: Normal monofilament exam         ASSESSMENT and PLAN    ICD-10-CM ICD-9-CM    1. Essential hypertension  I10 401.9 amLODIPine (NORVASC) 10 mg tablet      hydroCHLOROthiazide (HYDRODIURIL) 25 mg tablet      losartan (COZAAR) 100 mg tablet      metoprolol succinate (TOPROL-XL) 200 mg XL tablet      METABOLIC PANEL, BASIC   2. Chronic bilateral thoracic back pain  M54.6 724.1 XR SPINE THORAC 3 V    G89.29 338.29 tiZANidine (ZANAFLEX) 2 mg tablet   3. Severe obesity with body mass index (BMI) of 35.0 to 39.9 with serious comorbidity (HCC)  E66.01 278.01    4. Type 2 diabetes with nephropathy (HCC)  E11.21 250.40 HM DIABETES FOOT EXAM     583.81 glipiZIDE (GLUCOTROL) 10 mg tablet      SITagliptin-metFORMIN (Janumet XR) 100-1,000 mg WO91      METABOLIC PANEL, BASIC      HEMOGLOBIN A1C WITH EAG   5. Elevated cholesterol  E78.00 272.0 rosuvastatin (CRESTOR) 5 mg tablet      ubiquinoL-B12-FA-resveratroL 200-5-0.8-400 mg cap      LIPID PANEL      HEPATIC FUNCTION PANEL   6. Anxiety  F41.9 300.00 sertraline (ZOLOFT) 50 mg tablet   7. Multiple and bilateral precerebral artery syndromes  G45.2 433.30 aspirin-dipyridamole (AGGRENOX)  mg per SR capsule       Blood pressure extremely elevated today, out of Norvasc  Mechanical back pain, made worse by obesity  Needs lab follow up  Heat, tizanidine prn  T spine x-ray  Back stretches  Weight loss  Labs per orders. Refills per orders  Foot exam  was performed. .  Sensory and motor testing was assessed . Pedal pulse(s) was assessed. Follow-up and Dispositions    · Return in about 6 weeks (around 12/16/2020) for blood pressure. Reviewed plan of care.   Patient has provided input and agrees with goals.

## 2020-11-04 NOTE — PROGRESS NOTES
Chief Complaint   Patient presents with    Annual Wellness Visit    Hypertension     follow up     Back Pain     no know injury - x 1 month      Chief Complaint   Patient presents with    Annual Wellness Visit    Hypertension     follow up     Back Pain     no know injury - x 1 month            This is the Subsequent Medicare Annual Wellness Exam, performed 12 months or more after the Initial AWV or the last Subsequent AWV    I have reviewed the patient's medical history in detail and updated the computerized patient record. 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 9) E66.01    Asymptomatic bilateral carotid artery stenosis I65.23    Diabetic nephropathy ([facility]) E11.21    DM (diabetes mellitus), type 2 with complications ([facility]) J46.9    Other specified glaucoma H40.89     Past Medical History:   Diagnosis Date    ACP (advance care planning) 2/9/2016    Patient plans to prepare an advanced directive and bring it in   [address] 5/31/2016    Asymptomatic bilateral carotid artery stenosis 7/8/2018    Cataracts, bilateral 8/1/2011    Depression, major, single episode, moderate ([address] 8/1/2011    Sees Dr Luis A Peters    Diabetic nephropathy ([facility] [address] 8/1/2011    DM (diabetes mellitus), type 2 with complications ([address] 25/49/9319    Essential hypertension     Financial difficulties 7/30/2012    Hyperlipidemia     Obesity     SKYLER (obstructive sleep apnea) 2/9/2016    Other specified glaucoma 11/13/2019    Stroke ([address]     2004 AR[place]A  TIA (transient ischemic attack) 8/1/2011      Past Surgical History:   Procedure Laterality Date    HX GYN      HX HYSTERECTOMY      over 25 years    HX MYOMECTOMY      oiver 30 years     Current Outpatient Medications   Medication Sig Dispense Refill    aspirin-dipyridamole (AGGRENOX)  mg per SR capsule TAKE ONE CAPSULE BY MOUTH TWICE A  Cap 1    metoprolol succinate (TOPROL-XL) 200 mg XL tablet TAKE ONE TABLET BY MOUTH DAILY 90 Tab 0    losartan (COZAAR) 100 mg tablet TAKE ONE TABLET BY MOUTH DAILY 90 Tab 0    sertraline (ZOLOFT) 50 mg tablet TAKE ONE TABLET BY MOUTH DAILY 90 Tab 3    Myrbetriq 25 mg ER tablet       OXcarbazepine (TRILEPTAL) 300 mg tablet TAKE 1.5 TABLET BY MOUTH TWO TIMES A DAY 90 Tab 5    latanoprost (XALATAN) 0.005 % ophthalmic solution       glipiZIDE (GLUCOTROL) 10 mg tablet TAKE ONE TABLET BY MOUTH TWICE A  Tab 3    SITagliptin-metFORMIN (Janumet XR) 100-1,000 mg TM24 TAKE ONE TABLET BY MOUTH DAILY BEFORE DINNER 30 Tab 5    rosuvastatin (CRESTOR) 5 mg tablet Take 1 Tab by mouth nightly. 30 Tab 3    amLODIPine (NORVASC) 10 mg tablet TAKE ONE TABLET BY MOUTH DAILY 30 Tab 0    mometasone (NASONEX) 50 mcg/actuation nasal spray 2 Sprays by Both Nostrils route daily. 1 Container 5    hydroCHLOROthiazide (HYDRODIURIL) 25 mg tablet TAKE ONE TABLET BY MOUTH DAILY 90 Tab 0    omeprazole (PRILOSEC) 20 mg capsule TAKE ONE CAPSULE BY MOUTH DAILY 90 Cap 1    ONE TOUCH DELICA 33 gauge misc USE TO TEST ONCE DAILY AS DIRECTED 100 Lancet 4    ibuprofen (MOTRIN) 600 mg tablet Take 1 Tab by mouth every six (6) hours as needed for Pain. 40 Tab 1    ubiquinol-B12-FA-resveratrol 200-5-0.8-400 mg cap Take 1 Cap by mouth daily.  30 Cap 3    ONETOUCH ULTRA BLUE TEST STRIP strip USE TO TEST BLOOD SUGAR DAILY OR AS DIRECTED BY PHYSICIAN 50 Strip PRN    Blood Pressure Monitor (BLOOD PRESSURE KIT) kit Use 2-3 times a week or as directed 1 Kit 0    Blood-Glucose Meter [facility] ULTRAMINI) monitoring kit Test daily or as directed. 1 Kit 0    BD ULTRA-FINE II LANCETS 30 gauge misc TEST DAILY OR AS DIRECTED 200 Package prn     Allergies   Allergen Reactions    Hydroxyzine Other (comments)     Caused memory loss    Levaquin [Levofloxacin] Diarrhea    Lipitor [Atorvastatin] Myalgia    Lisinopril Cough    Penicillins Rash    Repatha Pushtronex [Evolocumab] Diarrhea    Sulfa (Sulfonamide Antibiotics) Rash    Zoloft [Sertraline] Diarrhea       Family History   Problem Relation Age of Onset    Hypertension Mother     Seizures Mother     Hypertension Son     Diabetes Son     Elevated Lipids Son     Ataxia Daughter     Seizures Brother     Cancer Maternal Aunt      Social History     Tobacco Use    Smoking status: Never Smoker    Smokeless tobacco: Never Used   Substance Use Topics    Alcohol use: Yes     Alcohol/week: 1.0 standard drinks     Types: 1 Standard drinks or equivalent per week     Comment: occasionally       Depression Risk Factor Screening:     3 most recent PHQ Screens 11/28/2018   Little interest or pleasure in doing things Not at all   Feeling down, depressed, irritable, or hopeless Not at all   Total Score PHQ 2 0       Alcohol Risk Screen   AUDIT Screen Score: AUDIT Score: 0      Document Brief Intervention (corresponds directly with the 5 A's, Ask, Advise, Assess, Assist, and Arrange):  NA    At- Risk Patients (Score 7-15 for women; 8-15 for men)  Discuss concern patient is drinking at unhealthy levels known to increase risk of alcohol-related health problems. Is Patient ready to commit to change?  NA    If No:   Encourage reflection   Discuss short term and long term health risks of consuming alcohol   Barriers to change   Reaffirm willingness to help / Educational materials provided  If Yes:   Set goal  Krux provided    Harmful use or Dependence (Score 16 or greater)   Discuss short term and long term health risks of consuming alcohol   Recommendations   Negotiate drinking goal   Recommend addiction specialist/center   Arrange follow-up appointments. Functional Ability and Level of Safety:   Hearing: Hearing is good. Activities of Daily Living:  ADL Assessment 11/4/2020   Feeding yourself No Help Needed   Getting from bed to chair No Help Needed   Getting dressed No Help Needed   Bathing or showering No Help Needed   Walk across the room (includes cane/walker) No Help Needed   Using the telphone No Help Needed   Taking your medications No Help Needed   Preparing meals No Help Needed   Managing money (expenses/bills) No Help Needed   Moderately strenuous housework (laundry) No Help Needed   Shopping for personal items (toiletries/medicines) No Help Needed   Shopping for groceries No Help Needed   Driving No Help Needed   Climbing a flight of stairs No Help Needed   Getting to places beyond walking distances No Help Needed          Ambulation: with no difficulty     Fall Risk:  Fall Risk Assessment, last 12 mths 11/4/2020   Able to walk? Yes   Fall in past 12 months? No   Fall with injury? -   Number of falls in past 12 months -   Fall Risk Score -     Abuse Screen:  Abuse Screening Questionnaire 11/4/2020   Do you ever feel afraid of your partner? N   Are you in a relationship with someone who physically or mentally threatens you? N   Is it safe for you to go home?  Y            Cognitive Screening   Has your family/caregiver stated any concerns about your memory: no     Cognitive Screening: Normal - Sweet 16 Test    Patient Care Team   Patient Care Team:  Stephen Madrigal MD as PCP - General (Family Medicine)  Stephen Madrigal MD as PCP - REHABILITATION [facility] Empaneled Provider  Sara Tuttle MD (Orthopedic Surgery)  Oneida Hdz MD (Cardiology)  Guillermo Saini MD (Nephrology)  Brittany Garsia MD (Neurology)  Slava Barger MD as Physician (Sleep Medicine)  Shad Singh MD (Gastroenterology)    Assessment/Plan   Education and counseling provided:  Are appropriate based on today's review and evaluation  End-of-Life planning (with patient's consent)  Patient plans to complete and advanced directive and bring it in  [address] was discussed but the patient did not wish or was not able to name a surrogate decision maker or provide an Advance Care Plan       Diagnoses and all orders for this visit:    1. Essential hypertension  -     amLODIPine (NORVASC) 10 mg tablet; TAKE ONE TABLET BY MOUTH DAILY  -     hydroCHLOROthiazide (HYDRODIURIL) 25 mg tablet; TAKE ONE TABLET BY MOUTH DAILY  -     losartan (COZAAR) 100 mg tablet; TAKE ONE TABLET BY MOUTH DAILY  -     metoprolol succinate (TOPROL-XL) 200 mg XL tablet; Take 1 Tab by mouth daily.  -     METABOLIC PANEL, BASIC; Future    2. Chronic bilateral thoracic back pain  -     XR SPINE THORAC 3 V; Future  -     tiZANidine (ZANAFLEX) 2 mg tablet; Take 1 Tab by mouth three (3) times daily as needed for Pain. 3. Severe obesity with body mass index (BMI) of 35.0 to 39.9 with serious comorbidity ([facility] [address]    4. Type 2 diabetes with nephropathy (HCC)  -      DIABETES FOOT EXAM  -     glipiZIDE (GLUCOTROL) 10 mg tablet; TAKE ONE TABLET BY MOUTH TWICE A DAY  -     SITagliptin-metFORMIN (Janumet XR) 100-1,000 mg TM24; TAKE ONE TABLET BY MOUTH DAILY BEFORE DINNER  -     METABOLIC PANEL, BASIC; Future  -     HEMOGLOBIN A1C WITH EAG; Future    5. Elevated cholesterol  -     rosuvastatin (CRESTOR) 5 mg tablet; Take 1 Tab by mouth nightly. -     ubiquinoL-B12-FA-resveratroL 200-5-0.8-400 mg cap; Take 1 Cap by mouth daily.  -     LIPID PANEL; Future  -     HEPATIC FUNCTION PANEL; Future    6.  Anxiety  -     sertraline (ZOLOFT) 50 mg tablet; TAKE ONE TABLET BY MOUTH DAILY    7. Multiple and bilateral precerebral artery syndromes  -     aspirin-dipyridamole (AGGRENOX)  mg per SR capsule; TAKE ONE CAPSULE BY MOUTH TWICE A DAY    8. Medicare annual wellness visit, subsequent    9. Osteoporosis screening  -     DEXA BONE DENSITY STUDY AXIAL; Future    10. Screening for alcoholism  -     DC ANNUAL ALCOHOL SCREEN 15 MIN    11.  Encounter for immunization  -     varicella-zoster recombinant, PF, (SHINGRIX) 50 mcg/0.5 mL susr injection; 0.5 mL by IntraMUSCular route once for 1 dose.  -     FLU (FLUAD QUAD INFLUENZA VACCINE,QUAD,ADJUVANTED)        Health Maintenance Due   Topic Date Due    DTaP/Tdap/Td series (1 - Tdap) 10/08/1968    Shingrix Vaccine Age 50> (1 of 2) 10/08/1997    Medicare Yearly Exam  11/29/2019    Foot Exam Q1  02/12/2020    Bone Densitometry  05/09/2020    Lipid Screen  08/30/2020    Flu Vaccine (1) 09/01/2020    A1C test (Diabetic or Prediabetic)  11/13/2020    MICROALBUMIN Q1  11/13/2020

## 2020-11-04 NOTE — PATIENT INSTRUCTIONS
Medicare Wellness Visit, Female The best way to live healthy is to have a lifestyle where you eat a well-balanced diet, exercise regularly, limit alcohol use, and quit all forms of tobacco/nicotine, if applicable. Regular preventive services are another way to keep healthy. Preventive services (vaccines, screening tests, monitoring & exams) can help personalize your care plan, which helps you manage your own care. Screening tests can find health problems at the earliest stages, when they are easiest to treat. [facility] follows the current, evidence-based guidelines published by the [facility] ([facility]) when recommending preventive services for our patients. Because we follow these guidelines, sometimes recommendations change over time as research supports it. (For example, mammograms used to be recommended annually. Even though Medicare will still pay for an annual mammogram, the newer guidelines recommend a mammogram every two years for women of average risk). Of course, you and your doctor may decide to screen more often for some diseases, based on your risk and your co-morbidities (chronic disease you are already diagnosed with). Preventive services for you include: - Medicare offers their members a free annual wellness visit, which is time for you and your primary care provider to discuss and plan for your preventive service needs. Take advantage of this benefit every year! 
-All adults over the age of 72 should receive the recommended pneumonia vaccines. Current USPSTF guidelines recommend a series of two vaccines for the best pneumonia protection.  
-All adults should have a flu vaccine yearly and a tetanus vaccine every 10 years.  
-All adults age 48 and older should receive the shingles vaccines (series of two vaccines). -All adults age 38-68 who are overweight should have a diabetes screening test once every three years. -All adults born between [birth year] and 1963 should be screened once for Hepatitis C. 
-Other screening tests and preventive services for persons with diabetes include: an eye exam to screen for diabetic retinopathy, a kidney function test, a foot exam, and stricter control over your cholesterol.  
-Cardiovascular screening for adults with routine risk involves an electrocardiogram (ECG) at intervals determined by your doctor.  
-Colorectal cancer screenings should be done for adults age 54-65 with no increased risk factors for colorectal cancer. There are a number of acceptable methods of screening for this type of cancer. Each test has its own benefits and drawbacks. Discuss with your doctor what is most appropriate for you during your annual wellness visit. The different tests include: colonoscopy (considered the best screening method), a fecal occult blood test, a fecal DNA test, and sigmoidoscopy. 
 
-A bone mass density test is recommended when a woman turns 65 to screen for osteoporosis. This test is only recommended one time, as a screening. Some providers will use this same test as a disease monitoring tool if you already have osteoporosis. -Breast cancer screenings are recommended every other year for women of normal risk, age 54-69. 
-Cervical cancer screenings for women over age 72 are only recommended with certain risk factors. Here is a list of your current Health Maintenance items (your personalized list of preventive services) with a due date: 
Health Maintenance Due Topic Date Due  
 DTaP/Tdap/Td  (1 - Tdap) 10/08/1968  Shingles Vaccine (1 of 2) 10/08/1997 Sarabjit Annual Well Visit  11/29/2019  Diabetic Foot Care  02/12/2020  Bone Densitometry  05/09/2020  Cholesterol Test   08/30/2020  Yearly Flu Vaccine (1) 09/01/2020  Hemoglobin A1C    11/13/2020  Albumin Urine Test  11/13/2020

## 2020-11-06 ENCOUNTER — TELEPHONE (OUTPATIENT)
Dept: FAMILY MEDICINE CLINIC | Age: 75
End: 2020-11-06

## 2020-11-06 DIAGNOSIS — I10 ESSENTIAL HYPERTENSION: ICD-10-CM

## 2020-11-06 RX ORDER — HYDROCHLOROTHIAZIDE 25 MG/1
TABLET ORAL
Qty: 90 TAB | Refills: 0 | Status: SHIPPED | OUTPATIENT
Start: 2020-11-06

## 2020-11-06 RX ORDER — LOSARTAN POTASSIUM 100 MG/1
TABLET ORAL
Qty: 90 TAB | Refills: 0 | Status: SHIPPED | OUTPATIENT
Start: 2020-11-06

## 2020-11-09 ENCOUNTER — TELEPHONE (OUTPATIENT)
Dept: FAMILY MEDICINE CLINIC | Age: 75
End: 2020-11-09

## 2020-11-09 NOTE — TELEPHONE ENCOUNTER
Ubiquinol was denied. Medicare law does not include OTC medication under Medicare Part D. This is an OTC drug not a medical necessity decision.

## 2020-11-09 NOTE — TELEPHONE ENCOUNTER
Called Yarely. Did not receive an answer. Attempted to leave a voice message, but phone hung up on me prior to completion.

## 2020-11-09 NOTE — TELEPHONE ENCOUNTER
Yarely jain [facility] scheduling needs to speak with the nurse about the order for the bone density.  Please call [phone]

## 2020-11-11 ENCOUNTER — TELEPHONE (OUTPATIENT)
Dept: FAMILY MEDICINE CLINIC | Age: 75
End: 2020-11-11

## 2020-11-11 NOTE — TELEPHONE ENCOUNTER
Jhonny Fox 76 faxed back prescription for Ubiquinol 200mg B12 stating \"does not exist\". Please send substitution.

## 2020-11-12 ENCOUNTER — TELEPHONE (OUTPATIENT)
Dept: FAMILY MEDICINE CLINIC | Age: 75
End: 2020-11-12

## 2020-11-13 NOTE — TELEPHONE ENCOUNTER
Please call patient and let her know that her x-rays are consistent with osteoarthritis. .    Also, she has several abnormal labs. She needs to schedule a virtual visit about this.

## 2020-11-13 NOTE — TELEPHONE ENCOUNTER
Please call patient and let her know this. if she can bring me a label, I can try to match up her doses with another supplement.

## 2020-11-15 DIAGNOSIS — E11.21 TYPE 2 DIABETES WITH NEPHROPATHY (HCC): ICD-10-CM

## 2020-11-16 ENCOUNTER — HOSPITAL ENCOUNTER (OUTPATIENT)
Dept: MAMMOGRAPHY | Age: 75
Discharge: HOME | End: 2020-11-16
Attending: FAMILY MEDICINE
Payer: MEDICARE

## 2020-11-16 DIAGNOSIS — Z13.820 OSTEOPOROSIS SCREENING: ICD-10-CM

## 2020-11-16 PROCEDURE — 77080 DXA BONE DENSITY AXIAL: CPT

## 2020-11-17 ENCOUNTER — VIRTUAL VISIT (OUTPATIENT)
Dept: FAMILY MEDICINE CLINIC | Age: 75
End: 2020-11-17
Payer: MEDICARE

## 2020-11-17 DIAGNOSIS — R77.9 ELEVATED BLOOD PROTEIN: Primary | ICD-10-CM

## 2020-11-17 DIAGNOSIS — R94.4 DECREASED GFR: ICD-10-CM

## 2020-11-17 DIAGNOSIS — R61 NIGHT SWEATS: ICD-10-CM

## 2020-11-17 DIAGNOSIS — R77.9 ELEVATED BLOOD PROTEIN: ICD-10-CM

## 2020-11-17 DIAGNOSIS — E78.00 ELEVATED CHOLESTEROL: ICD-10-CM

## 2020-11-17 PROCEDURE — G8417 CALC BMI ABV UP PARAM F/U: HCPCS | Performed by: FAMILY MEDICINE

## 2020-11-17 PROCEDURE — 1101F PT FALLS ASSESS-DOCD LE1/YR: CPT | Performed by: FAMILY MEDICINE

## 2020-11-17 PROCEDURE — G8756 NO BP MEASURE DOC: HCPCS | Performed by: FAMILY MEDICINE

## 2020-11-17 PROCEDURE — G9717 DOC PT DX DEP/BP F/U NT REQ: HCPCS | Performed by: FAMILY MEDICINE

## 2020-11-17 PROCEDURE — 99213 OFFICE O/P EST LOW 20 MIN: CPT | Performed by: FAMILY MEDICINE

## 2020-11-17 PROCEDURE — G8427 DOCREV CUR MEDS BY ELIG CLIN: HCPCS | Performed by: FAMILY MEDICINE

## 2020-11-17 PROCEDURE — 1090F PRES/ABSN URINE INCON ASSESS: CPT | Performed by: FAMILY MEDICINE

## 2020-11-17 PROCEDURE — G8399 PT W/DXA RESULTS DOCUMENT: HCPCS | Performed by: FAMILY MEDICINE

## 2020-11-17 PROCEDURE — G9899 SCRN MAM PERF RSLTS DOC: HCPCS | Performed by: FAMILY MEDICINE

## 2020-11-17 PROCEDURE — G8536 NO DOC ELDER MAL SCRN: HCPCS | Performed by: FAMILY MEDICINE

## 2020-11-17 PROCEDURE — 3017F COLORECTAL CA SCREEN DOC REV: CPT | Performed by: FAMILY MEDICINE

## 2020-11-17 RX ORDER — ROSUVASTATIN CALCIUM 10 MG/1
10 TABLET, COATED ORAL
Qty: 30 TAB | Refills: 3 | Status: SHIPPED | OUTPATIENT
Start: 2020-11-17

## 2020-11-17 NOTE — PROGRESS NOTES
Mavis Goff is a 70 y.o. female who was seen by synchronous (real-time) audio-video technology on 11/17/2020. Assessment & Plan:   Diagnoses and all orders for this visit:    1. Elevated blood protein  -     FREE LIGHT CHAINS, KAPPA/LAMBDA, QT; Future  -     PROTEIN ELECTROPHORESIS; Future  -     PROTEIN ELECTROPHORESIS + RANDA, UR, 24HR; Future    2. Elevated cholesterol  -     rosuvastatin (CRESTOR) 10 mg tablet; Take 1 Tab by mouth nightly. -     LIPID PANEL; Future  -     HEPATIC FUNCTION PANEL; Future    3. Night sweats    4. Decreased GFR        GFR decrease may be related to her elevated blood proteins  Increase Crestor with lipid labs in 3 months  Labs per orders right now    Follow-up and Dispositions    · Return if symptoms worsen or fail to improve. Reviewed plan of care. Patient has provided input and agrees with goals. CPT Codes 73695-90805 for Established Patients may apply to this Telehealth Visit  Time-based coding, delete if not needed: I spent at least 15 minutes with this established patient, and >50% of the time was spent counseling and/or coordinating care regarding her lab results    Subjective:   Mavis Goff was seen for Results (discuss abnormal lab results )      Patient presents with:  Results: discuss abnormal lab results     Her recent labs were significant for elevated TP and globulin, a GFR of 56, and an LDL of 342.2. She is diabetic and has hyperlipidemia. The elevated proteins are new. She is taking 5 mg of Crestor HS. ROS      Objective:     Physical Exam    Due to this being a TeleHealth evaluation, many elements of the physical examination are unable to be assessed. We discussed the expected course, resolution and complications of the diagnosis(es) in detail. Medication risks, benefits, costs, interactions, and alternatives were discussed as indicated.   I advised her to contact the office if her condition worsens, changes or fails to improve as anticipated. She expressed understanding with the diagnosis(es) and plan. Pursuant to the emergency declaration under the [facility] waiver authority and the [address] and [facility] General Act, this Virtual  Visit was conducted, with patient's consent, to reduce the patient's risk of exposure to COVID-19 and provide continuity of care for an established patient. Services were provided through a video synchronous discussion virtually to substitute for in-person clinic visit.     Pantera Lee MD

## 2020-11-18 RX ORDER — SITAGLIPTIN AND METFORMIN HYDROCHLORIDE 100; 1000 MG/1; MG/1
TABLET, FILM COATED, EXTENDED RELEASE ORAL
Qty: 90 TAB | Refills: 4 | Status: SHIPPED | OUTPATIENT
Start: 2020-11-18 | End: 2020-12-11

## 2020-11-18 NOTE — TELEPHONE ENCOUNTER
Request from [address] [place] [address] to change prescription  Ubiquinol 100 mg soft gel capsules. Last prescription on file for B12 from 2019 is   ubiquinol-B12-FA-resveratrol 200-5-0.8-400 mg cap.   Ldm

## 2020-12-03 ENCOUNTER — TELEPHONE (OUTPATIENT)
Dept: FAMILY MEDICINE CLINIC | Age: 75
End: 2020-12-03

## 2020-12-03 LAB
ALBUMIN 24H MFR UR ELPH: 71.3 %
ALBUMIN SERPL ELPH-MCNC: 3.8 G/DL (ref 2.9–4.4)
ALBUMIN/GLOB SERPL: 1 {RATIO} (ref 0.7–1.7)
ALPHA1 GLOB 24H MFR UR ELPH: 3.9 %
ALPHA1 GLOB SERPL ELPH-MCNC: 0.2 G/DL (ref 0–0.4)
ALPHA2 GLOB 24H MFR UR ELPH: 5.7 %
ALPHA2 GLOB SERPL ELPH-MCNC: 0.8 G/DL (ref 0.4–1)
B-GLOBULIN MFR UR ELPH: 9 %
B-GLOBULIN SERPL ELPH-MCNC: 1.5 G/DL (ref 0.7–1.3)
GAMMA GLOB 24H MFR UR ELPH: 10.1 %
GAMMA GLOB SERPL ELPH-MCNC: 1.2 G/DL (ref 0.4–1.8)
GLOBULIN SER CALC-MCNC: 3.7 G/DL (ref 2.2–3.9)
INTERPRETATION UR IFE-IMP: ABNORMAL
KAPPA LC FREE SER-MCNC: 53.4 MG/L (ref 3.3–19.4)
KAPPA LC FREE/LAMBDA FREE SER: 1.85 {RATIO} (ref 0.26–1.65)
LAMBDA LC FREE SERPL-MCNC: 28.8 MG/L (ref 5.7–26.3)
M PROTEIN 24H MFR UR ELPH: ABNORMAL %
M PROTEIN SERPL ELPH-MCNC: ABNORMAL G/DL
NOTE, 149533: ABNORMAL
PLEASE NOTE, 011150: ABNORMAL
PROT 24H UR-MRATE: 1762 MG/24 HR (ref 30–150)
PROT SERPL-MCNC: 7.5 G/DL (ref 6–8.5)
PROT UR-MCNC: 135.5 MG/DL

## 2020-12-04 LAB — COLONOSCOPY, EXTERNAL: NORMAL

## 2020-12-09 ENCOUNTER — TELEPHONE (OUTPATIENT)
Dept: FAMILY MEDICINE CLINIC | Age: 75
End: 2020-12-09

## 2020-12-09 NOTE — TELEPHONE ENCOUNTER
Please call patient and reschedule her AWV. Also, her labs are abnormal and I need to review them with her.

## 2020-12-11 ENCOUNTER — VIRTUAL VISIT (OUTPATIENT)
Dept: FAMILY MEDICINE CLINIC | Age: 75
End: 2020-12-11
Payer: MEDICARE

## 2020-12-11 DIAGNOSIS — M85.89 OSTEOPENIA OF MULTIPLE SITES: ICD-10-CM

## 2020-12-11 DIAGNOSIS — R77.9 ELEVATED BLOOD PROTEIN: Primary | ICD-10-CM

## 2020-12-11 PROCEDURE — 99442 PR PHYS/QHP TELEPHONE EVALUATION 11-20 MIN: CPT | Performed by: FAMILY MEDICINE

## 2020-12-11 NOTE — PROGRESS NOTES
Antonio Ramirez is a 70 y.o. female evaluated via telephone on 12/11/2020. Consent:  She and/or health care decision maker is aware that she may receive a bill for this telephone service, depending on her insurance coverage, and has provided verbal consent to proceed: Yes      Documentation:  I communicated with the patient and/or health care decision maker about her bone density and labs. Details of this discussion including any medical advice provided:       Subjective:   Antonio Ramirez was seen for Labs (Review, Dexa scan results.)      Patient presents with:  Labs: Review, Dexa scan results. Her Kappa:Lambda ratio was elevated, but there was no M spike. Her bone density read: This patient is osteopenic using the World Health Organization criteria  As compared to the prior study, there has been an increase in the bone mineral  density of the lumbar spine of 5.1%, and a decrease in the bone mineral density  of the left total hip of 1.9%, of the right total hip of 1.2%, and of the left  distal third radius of 4.1%. 10 year probability of major osteoporotic fracture: 3.9%  10 year probability of hip fracture: 0.5%    She does not get much calcium in her diet and does not take a supplement. No exercise. Review of Systems   Constitutional: Negative for chills, fever and weight loss. No night sweats   Musculoskeletal:        No bone pain except for her knees         Objective: There were no vitals taken for this visit. Assessment & Plan:   Diagnoses and all orders for this visit:    1. Elevated blood protein  -     REFERRAL TO ONCOLOGY    2. Osteopenia of multiple sites  -     VITAMIN D, 25 HYDROXY; Future        Uncertain significance of labs  Regular, weight bearing exercise, calcium with D BID  Check vitamin D    Follow-up and Dispositions    · Return if symptoms worsen or fail to improve. Reviewed plan of care.   Patient has provided input and agrees with goals.      I affirm this is a Patient Initiated Episode with an Established Patient who has not had a related appointment within my department in the past 7 days or scheduled within the next 24 hours.     Total Time: minutes: 11-20 minutes    Note: not billable if this call serves to triage the patient into an appointment for the relevant concern      Alda Shay MD

## 2020-12-11 NOTE — PROGRESS NOTES
Chief Complaint   Patient presents with    Labs     Review, Dexa scan results. 1. Have you been to the ER, urgent care clinic since your last visit? Hospitalized since your last visit? No    2. Have you seen or consulted any other health care providers outside of the [address] [place] [address] since your last visit? Include any pap smears or colon screening.  No

## 2020-12-28 DIAGNOSIS — R05.3 CHRONIC COUGH: ICD-10-CM

## 2020-12-28 RX ORDER — OMEPRAZOLE 20 MG/1
CAPSULE, DELAYED RELEASE ORAL
Qty: 90 CAP | Refills: 0 | Status: SHIPPED | OUTPATIENT
Start: 2020-12-28

## 2021-01-13 ENCOUNTER — TELEPHONE (OUTPATIENT)
Dept: ONCOLOGY | Age: 76
End: 2021-01-13

## 2021-01-13 NOTE — TELEPHONE ENCOUNTER
LVM to make follow up   Return in about 2 weeks (around 1/26/2021) for [place], lab results, image results, ok for VV.

## 2021-01-18 ENCOUNTER — TELEPHONE (OUTPATIENT)
Dept: ONCOLOGY | Age: 76
End: 2021-01-18

## 2021-01-18 NOTE — TELEPHONE ENCOUNTER
DTE Jobr at [facility]  [phone]    01/18/21- Phone call placed to pt to remind pt to have labs drawn prior to her follow up appointment with . Pt verbalized understanding.

## 2021-01-20 ENCOUNTER — HOSPITAL ENCOUNTER (OUTPATIENT)
Dept: CT IMAGING | Age: 76
Discharge: HOME | End: 2021-01-20
Attending: INTERNAL MEDICINE
Payer: MEDICARE

## 2021-01-20 DIAGNOSIS — R10.9 FLANK PAIN: ICD-10-CM

## 2021-01-20 PROCEDURE — 74178 CT ABD&PLV WO CNTR FLWD CNTR: CPT

## 2021-01-20 PROCEDURE — 74011000636 HC RX REV CODE- 636: Performed by: RADIOLOGY

## 2021-01-20 RX ADMIN — IOPAMIDOL 100 ML: 612 INJECTION, SOLUTION INTRAVENOUS at 12:41

## 2021-01-25 NOTE — PROGRESS NOTES
[facility] at [address] [facility]  [place]: [phone]  F: [phone]      Reason for Visit:   Usha Cutler is a 70 y.o. female who is seen for follow up of elevated serum free light chains. History of Present Illness:   She reports feeling much better. Her pain completely resolved. She was still having the pain when she had her CT, though it looked ok. She reports no new symptoms. No fevers, chills, night sweats, unintentional weight loss, adenopathy. Review of systems was obtained and pertinent findings reviewed above. Past medical history, social history, family history, medications, and allergies are located in the electronic medical record. Physical Exam:   There were no vitals taken for this visit. General: alert, cooperative, no distress   Mental  status: normal mood, behavior, speech, dress, motor activity, and thought processes, able to follow commands   HENT: NCAT   Neck: no visualized mass   Resp: no respiratory distress   Neuro: no gross deficits   Skin: no discoloration or lesions of concern on visible areas   Psychiatric: normal affect, consistent with stated mood, no evidence of hallucinations       Due to this being a TeleHealth evaluation (During [place] [address] public health emergency), many elements of the physical examination are unable to be assessed. Evaluation of the following organ systems was limited: Vitals/Constitutional/EENT/Resp/CV/GI//MS/Neuro/Skin/Heme-Lymph-Imm. Results:     Lab Results   Component Value Date/Time    WBC 6.7 01/20/2021 10:57 AM    HGB 11.7 01/20/2021 10:57 AM    HCT 36.6 01/20/2021 10:57 AM    PLATELET 229 09/40/6424 10:57 AM    MCV 81 01/20/2021 10:57 AM    ABS.  NEUTROPHILS 4.1 01/20/2021 10:57 AM     Lab Results   Component Value Date/Time    Sodium 140 01/20/2021 10:57 AM    Potassium 3.7 01/20/2021 10:57 AM    Chloride 98 01/20/2021 10:57 AM    CO2 28 01/20/2021 10:57 AM Glucose 177 (H) 01/20/2021 10:57 AM    BUN 12 01/20/2021 10:57 AM    Creatinine 0.91 01/20/2021 10:57 AM    GFR est AA 72 01/20/2021 10:57 AM    GFR est non-AA 63 01/20/2021 10:57 AM    Calcium 9.7 01/20/2021 10:57 AM     Lab Results   Component Value Date/Time    Bilirubin, total 0.3 11/04/2020 11:39 AM    ALT (SGPT) 26 11/04/2020 11:39 AM    Alk. phosphatase 116 11/04/2020 11:39 AM    Protein, total 7.6 01/20/2021 10:57 AM    Albumin 4.5 01/20/2021 10:57 AM    Globulin 4.2 (H) 11/04/2020 11:39 AM     Lab Results   Component Value Date/Time    Sed rate (ESR) 19 01/31/2014 12:44 PM    TSH 1.710 01/31/2014 12:44 PM    M-Nick Not Observed 01/20/2021 10:57 AM    M-Nick Not Observed 11/30/2020 10:39 AM    Lipase 19 03/23/2018 09:40 AM    Hep C Virus Ab <0.1 02/09/2016 12:53 PM     Lab Results   Component Value Date/Time    INR 1.0 10/22/2009 12:54 PM    aPTT 24.5 10/22/2009 12:54 PM       M-SPIKE  Recent Labs     01/20/21  1057 11/30/20  1039   PE6T Not Observed  --    SPE6L  --  Not Observed       Free Kappa Light Chains  Recent Labs     01/20/21  1057 11/30/20  1039   KLFL1L 64.3* 53.4*       Free Lambda Light Chains  Recent Labs     01/20/21  1057 11/30/20  1039   KLFL2L 33.8* 28.8*       Light Chain Ratio  Recent Labs     01/20/21  1057 11/30/20  1039   KLFL3L 1.90* 1.85*       UPEP M-spike  Recent Labs     11/30/20  1039   IEU8L Not Observed       1/20/2020  RANDA: polyclonal increase    Xray thoracic spine 11/4/2020  Evidence of thoracic spondylosis. Xray lumbar spine 11/4/2020  1. Evidence of mild, lower lumbar scoliosis convex to the right. 2. Evidence of lumbar spondylosis. CT A/P 1/21/2021:  No renal, ureteral or bladder masses. No renal, ureteral or bladder calculi. Left renal cyst.  Incidental/nonemergent findings are as described above. Assessment:   1) Elevated serum free light chains  Mild elevation, with ratio of 1.9. Negative serum M-spike and negative 24 hour urine M-spike.   RANDA negative for monoclonal process. No hypercalcemia, renal dysfunction, or anemia. She does not appear to have myeloma, and I don't think further evaluation is needed at this time. 2) Back pain  Uncertain etiology. Resolved now. Xrays ok, no evidence of myeloma lesions. CT unrevealing as well. Follow up with PCP if this recurs. 3) Proteinuria  No evidence of myeloma, as above. Follow up with PCP, consider nephrology eval after she moves. Plan:     · Return to see me as needed    Of note, she is moving to [place] at the end of February to be closer to family. A total of 22 minutes were spent on the day of the visit reviewing the patients diagnostic tests, seeing the patient, and documenting in the record. Signed By: Alejandro Cherry MD      The patient was seen by synchronous (real-time) audio-video technology. I was in the office while conducting this encounter. The patient was at her home. Consent:  She and/or her healthcare decision maker is aware that this patient-initiated Telehealth encounter is a billable service, with coverage as determined by her insurance carrier. She is aware that she may receive a bill and has provided verbal consent to proceed: Yes    Pursuant to the emergency declaration under the [address] and the [facility] waiver authority and the [employer] and [employer] Act, this Virtual Visit was conducted, with patient's (and/or legal guardian's) consent, to reduce the patient's risk of exposure to COVID-19 and provide necessary medical care.

## 2021-01-27 DIAGNOSIS — G50.0 SUPRAORBITAL NEURALGIA: ICD-10-CM

## 2021-01-27 DIAGNOSIS — M54.81 OCCIPITAL NEURALGIA OF RIGHT SIDE: ICD-10-CM

## 2021-01-27 RX ORDER — OXCARBAZEPINE 300 MG/1
TABLET, FILM COATED ORAL
Qty: 90 TAB | Refills: 4 | Status: SHIPPED | OUTPATIENT
Start: 2021-01-27

## 2021-01-29 ENCOUNTER — VIRTUAL VISIT (OUTPATIENT)
Dept: ONCOLOGY | Age: 76
End: 2021-01-29
Payer: MEDICARE

## 2021-01-29 DIAGNOSIS — R76.8 ELEVATED SERUM IMMUNOGLOBULIN FREE LIGHT CHAINS: Primary | ICD-10-CM

## 2021-01-29 PROCEDURE — G8427 DOCREV CUR MEDS BY ELIG CLIN: HCPCS | Performed by: INTERNAL MEDICINE

## 2021-01-29 PROCEDURE — G8756 NO BP MEASURE DOC: HCPCS | Performed by: INTERNAL MEDICINE

## 2021-01-29 PROCEDURE — 1090F PRES/ABSN URINE INCON ASSESS: CPT | Performed by: INTERNAL MEDICINE

## 2021-01-29 PROCEDURE — 3017F COLORECTAL CA SCREEN DOC REV: CPT | Performed by: INTERNAL MEDICINE

## 2021-01-29 PROCEDURE — 1101F PT FALLS ASSESS-DOCD LE1/YR: CPT | Performed by: INTERNAL MEDICINE

## 2021-01-29 PROCEDURE — G9717 DOC PT DX DEP/BP F/U NT REQ: HCPCS | Performed by: INTERNAL MEDICINE

## 2021-01-29 PROCEDURE — 99213 OFFICE O/P EST LOW 20 MIN: CPT | Performed by: INTERNAL MEDICINE

## 2021-01-29 PROCEDURE — G8399 PT W/DXA RESULTS DOCUMENT: HCPCS | Performed by: INTERNAL MEDICINE

## 2021-01-29 PROCEDURE — G8536 NO DOC ELDER MAL SCRN: HCPCS | Performed by: INTERNAL MEDICINE

## 2021-01-29 PROCEDURE — G8417 CALC BMI ABV UP PARAM F/U: HCPCS | Performed by: INTERNAL MEDICINE

## 2021-01-29 NOTE — PROGRESS NOTES
Ele Rhodes is a 70 y.o. female follow for elevated free light chains. 1. Have you been to the ER, urgent care clinic since your last visit? Hospitalized since your last visit?no     2. Have you seen or consulted any other health care providers outside of the [address] [place] [address] since your last visit? Include any pap smears or colon screening.  no

## 2021-02-02 DIAGNOSIS — E78.00 ELEVATED CHOLESTEROL: ICD-10-CM

## 2021-02-07 RX ORDER — ROSUVASTATIN CALCIUM 5 MG/1
TABLET, COATED ORAL
Qty: 90 TAB | Refills: 0 | Status: SHIPPED | OUTPATIENT
Start: 2021-02-07

## 2021-03-08 ENCOUNTER — TELEPHONE (OUTPATIENT)
Dept: FAMILY MEDICINE CLINIC | Age: 76
End: 2021-03-08

## 2021-03-08 NOTE — TELEPHONE ENCOUNTER
Pt called to let Dr Rebecca Christiansen know that she has moved to [facility].

## 2021-08-03 ENCOUNTER — DOCUMENTATION ONLY (OUTPATIENT)
Dept: SLEEP MEDICINE | Age: 76
End: 2021-08-03

## 2021-09-22 ENCOUNTER — TELEPHONE (OUTPATIENT)
Dept: FAMILY MEDICINE CLINIC | Age: 76
End: 2021-09-22

## 2022-03-18 PROBLEM — E11.8 DM (DIABETES MELLITUS), TYPE 2 WITH COMPLICATIONS (HCC): Status: ACTIVE | Noted: 2019-11-12

## 2022-03-18 PROBLEM — H40.89 OTHER SPECIFIED GLAUCOMA: Status: ACTIVE | Noted: 2019-11-13

## 2022-03-19 PROBLEM — I65.23 ASYMPTOMATIC BILATERAL CAROTID ARTERY STENOSIS: Status: ACTIVE | Noted: 2018-07-08

## 2023-05-30 RX ORDER — AMLODIPINE BESYLATE 10 MG/1
1 TABLET ORAL DAILY
COMMUNITY
Start: 2020-11-04

## 2023-05-30 RX ORDER — OXCARBAZEPINE 300 MG/1
TABLET, FILM COATED ORAL
COMMUNITY
Start: 2021-01-27

## 2023-05-30 RX ORDER — OMEPRAZOLE 20 MG/1
1 CAPSULE, DELAYED RELEASE ORAL DAILY
COMMUNITY
Start: 2020-12-28

## 2023-05-30 RX ORDER — LATANOPROST 50 UG/ML
SOLUTION/ DROPS OPHTHALMIC
COMMUNITY
Start: 2020-04-23

## 2023-05-30 RX ORDER — IBUPROFEN 600 MG/1
TABLET ORAL EVERY 6 HOURS PRN
COMMUNITY
Start: 2019-11-17

## 2023-05-30 RX ORDER — ASCORBIC ACID 250 MG
TABLET ORAL
COMMUNITY

## 2023-05-30 RX ORDER — HYDROCHLOROTHIAZIDE 25 MG/1
1 TABLET ORAL DAILY
COMMUNITY
Start: 2020-11-06

## 2023-05-30 RX ORDER — ASPIRIN AND DIPYRIDAMOLE 25; 200 MG/1; MG/1
1 CAPSULE, EXTENDED RELEASE ORAL 2 TIMES DAILY
COMMUNITY
Start: 2020-11-04

## 2023-05-30 RX ORDER — MOMETASONE FUROATE 50 UG/1
2 SPRAY, METERED NASAL DAILY
COMMUNITY
Start: 2020-05-15

## 2023-05-30 RX ORDER — METOPROLOL SUCCINATE 200 MG/1
TABLET, EXTENDED RELEASE ORAL DAILY
COMMUNITY
Start: 2020-11-04

## 2023-05-30 RX ORDER — LOSARTAN POTASSIUM 100 MG/1
1 TABLET ORAL DAILY
COMMUNITY
Start: 2020-11-06

## 2023-05-30 RX ORDER — ROSUVASTATIN CALCIUM 5 MG/1
1 TABLET, COATED ORAL NIGHTLY
COMMUNITY
Start: 2021-02-07

## 2023-05-30 RX ORDER — TIZANIDINE 2 MG/1
TABLET ORAL 3 TIMES DAILY PRN
COMMUNITY
Start: 2020-11-04

## 2023-05-30 RX ORDER — GLIPIZIDE 10 MG/1
1 TABLET ORAL 2 TIMES DAILY
COMMUNITY
Start: 2020-11-04

## 2023-05-30 RX ORDER — ROSUVASTATIN CALCIUM 10 MG/1
TABLET, COATED ORAL
COMMUNITY
Start: 2020-11-17

## 2023-10-23 NOTE — PROGRESS NOTES
HISTORY OF PRESENT ILLNESS  June Remy is a 81 y.o. female. HPI Comments: June Remy is here for follow up on her HTN. She also fell last week. Evidently, she slid down a slick incline, fell backwards and hit her head on the pavement. Denies LOC or feeling daze. Currently, she has a mild headache, which is improving. Otherwise, she feels fine. Hypertension   The history is provided by the patient (she is out of Exforge). This is a chronic problem. The current episode started more than 1 week ago. The problem occurs constantly. The problem has been gradually improving. Associated symptoms include abdominal pain and headaches. Pertinent negatives include no chest pain and no shortness of breath. Associated symptoms comments: She has been having abdominal pain for about 2 months, that is getting worse. It is in the upper abdomen, radiating around to the back. Worse if she coughs. The pain is sharp. She takes BC's 2-3 x a week. . Nothing aggravates the symptoms. The symptoms are relieved by medications. Treatments tried: HCTZ, Exforge, Toprol XL. The treatment provided moderate relief. Fall   Associated symptoms include abdominal pain and headaches. Pertinent negatives include no chest pain and no shortness of breath. Review of Systems   Constitutional: Negative for weight loss. No weight gain   Eyes: Negative for blurred vision. Respiratory: Negative for shortness of breath. Cardiovascular: Negative for chest pain and leg swelling. Gastrointestinal: Positive for abdominal pain and nausea. Negative for blood in stool, constipation, diarrhea, heartburn, melena and vomiting. She can feel liquid coming up in the side of her left throat. Neurological: Positive for headaches. Negative for dizziness, sensory change, speech change, focal weakness and loss of consciousness.        Visit Vitals    /84    Pulse (!) 55    Temp 98 °F (36.7 °C) (Oral)    Resp 20    Ht 5' 4\" (1.626 m)    Wt 237 lb (107.5 kg)    BMI 40.68 kg/m2     BP Readings from Last 3 Encounters:   03/23/18 153/84   03/12/18 161/71   02/13/18 168/83     Physical Exam   Constitutional: She is oriented to person, place, and time. She appears well-developed and well-nourished. No distress. HENT:   Head:       Cardiovascular: Normal rate, regular rhythm and normal heart sounds. Exam reveals no gallop and no friction rub. No murmur heard. Pulmonary/Chest: Effort normal and breath sounds normal. No respiratory distress. She has no wheezes. She has no rales. Abdominal: Soft. Normal appearance and bowel sounds are normal. She exhibits no distension. There is no hepatosplenomegaly. There is tenderness in the left upper quadrant. There is no rebound and no guarding. Musculoskeletal: She exhibits no edema. Neurological: She is alert and oriented to person, place, and time. Skin: Skin is warm and dry. She is not diaphoretic. No scalp redness or bruising. Skin intact. Nursing note and vitals reviewed. ASSESSMENT and PLAN    ICD-10-CM ICD-9-CM    1. Essential hypertension I73 184.9 METABOLIC PANEL, COMPREHENSIVE      amLODIPine-valsartan (EXFORGE)  mg per tablet   2. Contusion of scalp, initial encounter S00. 03XA 920    3. Left upper quadrant pain H13.19 643.13 METABOLIC PANEL, COMPREHENSIVE      CBC WITH AUTOMATED DIFF      LIPASE      omeprazole (PRILOSEC) 20 mg capsule      REFERRAL TO GASTROENTEROLOGY   4. Type 2 diabetes with nephropathy (HCC) K83.92 545.44 METABOLIC PANEL, COMPREHENSIVE     583.81 HEMOGLOBIN A1C WITH EAG   5. Elevated cholesterol E78.00 272.0 NMR LIPOPROFILE      METABOLIC PANEL, COMPREHENSIVE   6.  Diabetic nephropathy associated with type 2 diabetes mellitus (HCC) V40.86 670.06 METABOLIC PANEL, COMPREHENSIVE     583.81         Blood pressure elevated, out of medication  Head contusion, no evidence of trauma today  Abdominal pain, possible gastroesophageal reflux disease? Needs diabetic labs  Refills per orders  Reassurance about head contusion  Labs per orders. Gastroenterology referral    Follow-up Disposition:  Return in about 1 month (around 4/23/2018) for blood pressure check. Reviewed plan of care. Patient has provided input and agrees with goals. 23-Oct-2023 05:33